# Patient Record
Sex: MALE | Race: WHITE | NOT HISPANIC OR LATINO | Employment: OTHER | URBAN - METROPOLITAN AREA
[De-identification: names, ages, dates, MRNs, and addresses within clinical notes are randomized per-mention and may not be internally consistent; named-entity substitution may affect disease eponyms.]

---

## 2021-01-01 ENCOUNTER — TELEPHONE (OUTPATIENT)
Dept: GASTROENTEROLOGY | Facility: CLINIC | Age: 76
End: 2021-01-01

## 2021-01-01 ENCOUNTER — OFFICE VISIT (OUTPATIENT)
Dept: GASTROENTEROLOGY | Facility: CLINIC | Age: 76
End: 2021-01-01
Payer: COMMERCIAL

## 2021-01-01 VITALS
HEIGHT: 68 IN | WEIGHT: 205 LBS | DIASTOLIC BLOOD PRESSURE: 80 MMHG | SYSTOLIC BLOOD PRESSURE: 150 MMHG | BODY MASS INDEX: 31.07 KG/M2

## 2021-01-01 DIAGNOSIS — R10.10 PAIN OF UPPER ABDOMEN: Primary | ICD-10-CM

## 2021-01-01 DIAGNOSIS — C61 PROSTATE CANCER (HCC): ICD-10-CM

## 2021-01-01 DIAGNOSIS — R19.4 CHANGE IN BOWEL HABIT: Primary | ICD-10-CM

## 2021-01-01 DIAGNOSIS — R19.4 CHANGE IN BOWEL HABIT: ICD-10-CM

## 2021-01-01 DIAGNOSIS — Z15.03 BRCA GENE MUTATION POSITIVE IN MALE: ICD-10-CM

## 2021-01-01 DIAGNOSIS — I25.10 CORONARY ARTERY DISEASE INVOLVING NATIVE CORONARY ARTERY OF NATIVE HEART WITHOUT ANGINA PECTORIS: ICD-10-CM

## 2021-01-01 DIAGNOSIS — Z15.09 BRCA GENE MUTATION POSITIVE IN MALE: ICD-10-CM

## 2021-01-01 DIAGNOSIS — K21.9 GASTROESOPHAGEAL REFLUX DISEASE WITHOUT ESOPHAGITIS: ICD-10-CM

## 2021-01-01 DIAGNOSIS — Z15.01 BRCA GENE MUTATION POSITIVE IN MALE: ICD-10-CM

## 2021-01-01 PROCEDURE — 99203 OFFICE O/P NEW LOW 30 MIN: CPT | Performed by: INTERNAL MEDICINE

## 2021-01-01 RX ORDER — CETIRIZINE HYDROCHLORIDE 10 MG/1
10 TABLET ORAL
COMMUNITY

## 2021-01-01 RX ORDER — CARVEDILOL 12.5 MG/1
TABLET ORAL
COMMUNITY
Start: 2021-01-01 | End: 2022-01-01 | Stop reason: HOSPADM

## 2021-01-01 RX ORDER — SODIUM PICOSULFATE, MAGNESIUM OXIDE, AND ANHYDROUS CITRIC ACID 10; 3.5; 12 MG/160ML; G/160ML; G/160ML
LIQUID ORAL
Qty: 320 ML | Refills: 0 | Status: SHIPPED | COMMUNITY
Start: 2021-01-01 | End: 2022-01-01 | Stop reason: ALTCHOICE

## 2021-01-01 RX ORDER — PANTOPRAZOLE SODIUM 20 MG/1
TABLET, DELAYED RELEASE ORAL
COMMUNITY
Start: 2021-01-01

## 2021-01-01 RX ORDER — ATORVASTATIN CALCIUM 40 MG/1
TABLET, FILM COATED ORAL
COMMUNITY
Start: 2021-01-01 | End: 2022-01-01 | Stop reason: HOSPADM

## 2021-01-01 RX ORDER — LISINOPRIL 10 MG/1
TABLET ORAL
COMMUNITY
Start: 2021-01-01 | End: 2022-01-01 | Stop reason: HOSPADM

## 2021-01-01 RX ORDER — ASPIRIN 81 MG/1
81 TABLET ORAL DAILY
COMMUNITY

## 2021-12-21 PROBLEM — R19.4 CHANGE IN BOWEL HABIT: Status: ACTIVE | Noted: 2021-01-01

## 2021-12-21 PROBLEM — Z15.01 BRCA GENE MUTATION POSITIVE IN MALE: Status: ACTIVE | Noted: 2021-01-01

## 2021-12-21 PROBLEM — Z15.09 BRCA GENE MUTATION POSITIVE IN MALE: Status: ACTIVE | Noted: 2021-01-01

## 2021-12-21 PROBLEM — Z15.03 BRCA GENE MUTATION POSITIVE IN MALE: Status: ACTIVE | Noted: 2021-01-01

## 2021-12-21 PROBLEM — C61 PROSTATE CANCER (HCC): Status: ACTIVE | Noted: 2021-01-01

## 2021-12-21 PROBLEM — R10.10 PAIN OF UPPER ABDOMEN: Status: ACTIVE | Noted: 2021-01-01

## 2021-12-21 PROBLEM — I25.10 CORONARY ARTERY DISEASE INVOLVING NATIVE CORONARY ARTERY OF NATIVE HEART WITHOUT ANGINA PECTORIS: Status: ACTIVE | Noted: 2021-01-01

## 2022-01-01 ENCOUNTER — TELEPHONE (OUTPATIENT)
Dept: GASTROENTEROLOGY | Facility: CLINIC | Age: 77
End: 2022-01-01

## 2022-01-01 ENCOUNTER — HOME CARE VISIT (OUTPATIENT)
Dept: HOME HEALTH SERVICES | Facility: HOME HEALTHCARE | Age: 77
End: 2022-01-01
Payer: MEDICARE

## 2022-01-01 ENCOUNTER — TELEPHONE (OUTPATIENT)
Dept: HEMATOLOGY ONCOLOGY | Facility: CLINIC | Age: 77
End: 2022-01-01

## 2022-01-01 ENCOUNTER — HOME CARE VISIT (OUTPATIENT)
Dept: HOME HOSPICE | Facility: HOSPICE | Age: 77
End: 2022-01-01
Payer: MEDICARE

## 2022-01-01 ENCOUNTER — PATIENT OUTREACH (OUTPATIENT)
Dept: HEMATOLOGY ONCOLOGY | Facility: CLINIC | Age: 77
End: 2022-01-01

## 2022-01-01 ENCOUNTER — APPOINTMENT (OUTPATIENT)
Dept: LAB | Facility: HOSPITAL | Age: 77
End: 2022-01-01
Attending: INTERNAL MEDICINE
Payer: COMMERCIAL

## 2022-01-01 ENCOUNTER — HOSPITAL ENCOUNTER (OUTPATIENT)
Dept: CT IMAGING | Facility: HOSPITAL | Age: 77
Discharge: HOME | End: 2022-01-19
Attending: INTERNAL MEDICINE
Payer: COMMERCIAL

## 2022-01-01 ENCOUNTER — OFFICE VISIT (OUTPATIENT)
Dept: PALLIATIVE MEDICINE | Age: 77
End: 2022-01-01
Payer: COMMERCIAL

## 2022-01-01 ENCOUNTER — APPOINTMENT (OUTPATIENT)
Dept: GASTROENTEROLOGY | Facility: HOSPITAL | Age: 77
DRG: 435 | End: 2022-01-01
Payer: MEDICARE

## 2022-01-01 ENCOUNTER — TELEPHONE (OUTPATIENT)
Dept: NUTRITION | Facility: CLINIC | Age: 77
End: 2022-01-01

## 2022-01-01 ENCOUNTER — APPOINTMENT (INPATIENT)
Dept: RADIOLOGY | Facility: HOSPITAL | Age: 77
DRG: 380 | End: 2022-01-01
Payer: MEDICARE

## 2022-01-01 ENCOUNTER — HOSPITAL ENCOUNTER (OUTPATIENT)
Dept: INFUSION CENTER | Facility: HOSPITAL | Age: 77
End: 2022-01-01
Attending: INTERNAL MEDICINE

## 2022-01-01 ENCOUNTER — HOSPITAL ENCOUNTER (OUTPATIENT)
Dept: INFUSION CENTER | Facility: HOSPITAL | Age: 77
Discharge: HOME | End: 2022-03-22
Payer: COMMERCIAL

## 2022-01-01 ENCOUNTER — TELEPHONE (OUTPATIENT)
Dept: PALLIATIVE MEDICINE | Facility: CLINIC | Age: 77
End: 2022-01-01

## 2022-01-01 ENCOUNTER — HOSPITAL ENCOUNTER (OUTPATIENT)
Dept: INFUSION CENTER | Facility: HOSPITAL | Age: 77
Discharge: HOME | End: 2022-04-22
Attending: INTERNAL MEDICINE
Payer: MEDICARE

## 2022-01-01 ENCOUNTER — TELEPHONE (OUTPATIENT)
Dept: HEMATOLOGY ONCOLOGY | Facility: HOSPITAL | Age: 77
End: 2022-01-01

## 2022-01-01 ENCOUNTER — OFFICE VISIT (OUTPATIENT)
Dept: HEMATOLOGY ONCOLOGY | Facility: CLINIC | Age: 77
End: 2022-01-01
Payer: COMMERCIAL

## 2022-01-01 ENCOUNTER — ANESTHESIA (INPATIENT)
Dept: GASTROENTEROLOGY | Facility: HOSPITAL | Age: 77
DRG: 380 | End: 2022-01-01
Payer: MEDICARE

## 2022-01-01 ENCOUNTER — HOSPITAL ENCOUNTER (OUTPATIENT)
Dept: INFUSION CENTER | Facility: HOSPITAL | Age: 77
Discharge: HOME | End: 2022-04-18
Payer: COMMERCIAL

## 2022-01-01 ENCOUNTER — PATIENT MESSAGE (OUTPATIENT)
Dept: GASTROENTEROLOGY | Facility: MEDICAL CENTER | Age: 77
End: 2022-01-01

## 2022-01-01 ENCOUNTER — HOSPITAL ENCOUNTER (OUTPATIENT)
Dept: GASTROENTEROLOGY | Facility: HOSPITAL | Age: 77
Setting detail: OUTPATIENT SURGERY
Discharge: HOME | End: 2022-03-31
Attending: INTERNAL MEDICINE | Admitting: INTERNAL MEDICINE
Payer: COMMERCIAL

## 2022-01-01 ENCOUNTER — TELEPHONE (OUTPATIENT)
Dept: OTHER | Facility: OTHER | Age: 77
End: 2022-01-01

## 2022-01-01 ENCOUNTER — SOCIAL WORK (OUTPATIENT)
Dept: PALLIATIVE MEDICINE | Age: 77
End: 2022-01-01
Payer: COMMERCIAL

## 2022-01-01 ENCOUNTER — HOSPITAL ENCOUNTER (OUTPATIENT)
Dept: INFUSION CENTER | Facility: HOSPITAL | Age: 77
End: 2022-01-01

## 2022-01-01 ENCOUNTER — HOSPITAL ENCOUNTER (OUTPATIENT)
Dept: INTERVENTIONAL RADIOLOGY/VASCULAR | Facility: HOSPITAL | Age: 77
Discharge: HOME | End: 2022-02-10
Attending: INTERNAL MEDICINE
Payer: COMMERCIAL

## 2022-01-01 ENCOUNTER — HOSPITAL ENCOUNTER (OUTPATIENT)
Dept: INFUSION CENTER | Facility: HOSPITAL | Age: 77
Discharge: HOME | End: 2022-03-25
Attending: INTERNAL MEDICINE
Payer: COMMERCIAL

## 2022-01-01 ENCOUNTER — APPOINTMENT (OUTPATIENT)
Dept: RADIOLOGY | Facility: HOSPITAL | Age: 77
DRG: 435 | End: 2022-01-01
Payer: MEDICARE

## 2022-01-01 ENCOUNTER — ANESTHESIA EVENT (OUTPATIENT)
Dept: GASTROENTEROLOGY | Facility: HOSPITAL | Age: 77
DRG: 435 | End: 2022-01-01
Payer: MEDICARE

## 2022-01-01 ENCOUNTER — NURSE TRIAGE (OUTPATIENT)
Dept: OTHER | Facility: OTHER | Age: 77
End: 2022-01-01

## 2022-01-01 ENCOUNTER — HOSPITAL ENCOUNTER (OUTPATIENT)
Dept: INFUSION CENTER | Facility: HOSPITAL | Age: 77
Discharge: HOME | End: 2022-02-11
Attending: INTERNAL MEDICINE
Payer: COMMERCIAL

## 2022-01-01 ENCOUNTER — OFFICE VISIT (OUTPATIENT)
Dept: GASTROENTEROLOGY | Facility: CLINIC | Age: 77
End: 2022-01-01
Payer: COMMERCIAL

## 2022-01-01 ENCOUNTER — ANESTHESIA (OUTPATIENT)
Dept: GASTROENTEROLOGY | Facility: HOSPITAL | Age: 77
End: 2022-01-01

## 2022-01-01 ENCOUNTER — HOSPITAL ENCOUNTER (INPATIENT)
Facility: HOSPITAL | Age: 77
LOS: 9 days | Discharge: HOME HEALTH | DRG: 380 | End: 2022-04-13
Attending: EMERGENCY MEDICINE | Admitting: INTERNAL MEDICINE
Payer: MEDICARE

## 2022-01-01 ENCOUNTER — LAB REQUISITION (OUTPATIENT)
Dept: LAB | Facility: HOSPITAL | Age: 77
End: 2022-01-01

## 2022-01-01 ENCOUNTER — OFFICE VISIT (OUTPATIENT)
Dept: HEMATOLOGY ONCOLOGY | Facility: HOSPITAL | Age: 77
End: 2022-01-01
Payer: COMMERCIAL

## 2022-01-01 ENCOUNTER — HOSPITAL ENCOUNTER (INPATIENT)
Facility: HOSPITAL | Age: 77
LOS: 1 days | Discharge: HOME HEALTH | DRG: 435 | End: 2022-01-22
Attending: EMERGENCY MEDICINE | Admitting: FAMILY MEDICINE
Payer: MEDICARE

## 2022-01-01 ENCOUNTER — ANESTHESIA EVENT (OUTPATIENT)
Dept: GASTROENTEROLOGY | Facility: HOSPITAL | Age: 77
End: 2022-01-01

## 2022-01-01 ENCOUNTER — HOSPICE ADMISSION (OUTPATIENT)
Dept: HOME HOSPICE | Facility: HOSPICE | Age: 77
End: 2022-01-01
Payer: MEDICARE

## 2022-01-01 ENCOUNTER — HOSPITAL ENCOUNTER (OUTPATIENT)
Dept: INFUSION CENTER | Facility: HOSPITAL | Age: 77
Discharge: HOME | End: 2022-03-21
Payer: COMMERCIAL

## 2022-01-01 ENCOUNTER — OFFICE VISIT (OUTPATIENT)
Dept: WOUND CARE | Facility: HOSPITAL | Age: 77
End: 2022-01-01
Payer: MEDICARE

## 2022-01-01 ENCOUNTER — HOSPITAL ENCOUNTER (OUTPATIENT)
Dept: INFUSION CENTER | Facility: HOSPITAL | Age: 77
Discharge: HOME | End: 2022-02-25
Attending: INTERNAL MEDICINE
Payer: COMMERCIAL

## 2022-01-01 ENCOUNTER — HOSPITAL ENCOUNTER (OUTPATIENT)
Dept: INFUSION CENTER | Facility: HOSPITAL | Age: 77
Discharge: HOME | End: 2022-02-04
Attending: INTERNAL MEDICINE
Payer: COMMERCIAL

## 2022-01-01 ENCOUNTER — HOSPITAL ENCOUNTER (OUTPATIENT)
Dept: INFUSION CENTER | Facility: HOSPITAL | Age: 77
Discharge: HOME | End: 2022-04-20
Attending: INTERNAL MEDICINE
Payer: COMMERCIAL

## 2022-01-01 ENCOUNTER — PREP FOR PROCEDURE (OUTPATIENT)
Dept: GASTROENTEROLOGY | Facility: CLINIC | Age: 77
End: 2022-01-01

## 2022-01-01 ENCOUNTER — APPOINTMENT (OUTPATIENT)
Dept: LAB | Facility: HOSPITAL | Age: 77
End: 2022-01-01
Payer: MEDICARE

## 2022-01-01 ENCOUNTER — ANESTHESIA (OUTPATIENT)
Dept: GASTROENTEROLOGY | Facility: HOSPITAL | Age: 77
DRG: 435 | End: 2022-01-01
Payer: MEDICARE

## 2022-01-01 ENCOUNTER — APPOINTMENT (OUTPATIENT)
Dept: LAB | Facility: HOSPITAL | Age: 77
End: 2022-01-01
Payer: COMMERCIAL

## 2022-01-01 ENCOUNTER — TELEPHONE (OUTPATIENT)
Dept: INTERVENTIONAL RADIOLOGY/VASCULAR | Facility: HOSPITAL | Age: 77
End: 2022-01-01

## 2022-01-01 ENCOUNTER — TELEMEDICINE (OUTPATIENT)
Dept: PALLIATIVE MEDICINE | Age: 77
End: 2022-01-01
Payer: COMMERCIAL

## 2022-01-01 ENCOUNTER — ANESTHESIA EVENT (INPATIENT)
Dept: GASTROENTEROLOGY | Facility: HOSPITAL | Age: 77
DRG: 380 | End: 2022-01-01
Payer: MEDICARE

## 2022-01-01 ENCOUNTER — HOSPITAL ENCOUNTER (OUTPATIENT)
Dept: INFUSION CENTER | Facility: HOSPITAL | Age: 77
Discharge: HOME | End: 2022-04-25
Payer: MEDICARE

## 2022-01-01 ENCOUNTER — ANESTHESIA EVENT (OUTPATIENT)
Dept: ANESTHESIOLOGY | Facility: HOSPITAL | Age: 77
End: 2022-01-01

## 2022-01-01 ENCOUNTER — HOSPITAL ENCOUNTER (OUTPATIENT)
Dept: CT IMAGING | Facility: HOSPITAL | Age: 77
Discharge: HOME | End: 2022-02-05
Attending: INTERNAL MEDICINE
Payer: COMMERCIAL

## 2022-01-01 ENCOUNTER — APPOINTMENT (EMERGENCY)
Dept: RADIOLOGY | Facility: HOSPITAL | Age: 77
DRG: 380 | End: 2022-01-01
Payer: MEDICARE

## 2022-01-01 ENCOUNTER — OFFICE VISIT (OUTPATIENT)
Dept: WOUND CARE | Facility: HOSPITAL | Age: 77
End: 2022-01-01
Payer: COMMERCIAL

## 2022-01-01 ENCOUNTER — HOSPITAL ENCOUNTER (OUTPATIENT)
Dept: INFUSION CENTER | Facility: HOSPITAL | Age: 77
Discharge: HOME | End: 2022-03-18
Attending: INTERNAL MEDICINE
Payer: COMMERCIAL

## 2022-01-01 ENCOUNTER — TELEPHONE (OUTPATIENT)
Dept: INTERNAL MEDICINE UNIT | Facility: HOSPITAL | Age: 77
End: 2022-01-01

## 2022-01-01 ENCOUNTER — HOSPITAL ENCOUNTER (OUTPATIENT)
Dept: INFUSION CENTER | Facility: HOSPITAL | Age: 77
Discharge: HOME | End: 2022-03-04
Attending: INTERNAL MEDICINE
Payer: COMMERCIAL

## 2022-01-01 ENCOUNTER — APPOINTMENT (OUTPATIENT)
Dept: GASTROENTEROLOGY | Facility: HOSPITAL | Age: 77
DRG: 380 | End: 2022-01-01
Payer: MEDICARE

## 2022-01-01 ENCOUNTER — DOCUMENTATION (OUTPATIENT)
Dept: HEMATOLOGY ONCOLOGY | Facility: HOSPITAL | Age: 77
End: 2022-01-01

## 2022-01-01 ENCOUNTER — ANESTHESIA (OUTPATIENT)
Dept: ANESTHESIOLOGY | Facility: HOSPITAL | Age: 77
End: 2022-01-01

## 2022-01-01 VITALS
WEIGHT: 178 LBS | HEART RATE: 111 BPM | SYSTOLIC BLOOD PRESSURE: 112 MMHG | BODY MASS INDEX: 27.06 KG/M2 | RESPIRATION RATE: 14 BRPM | TEMPERATURE: 95.9 F | DIASTOLIC BLOOD PRESSURE: 90 MMHG | OXYGEN SATURATION: 93 %

## 2022-01-01 VITALS
DIASTOLIC BLOOD PRESSURE: 73 MMHG | SYSTOLIC BLOOD PRESSURE: 104 MMHG | RESPIRATION RATE: 14 BRPM | OXYGEN SATURATION: 97 % | HEART RATE: 105 BPM | TEMPERATURE: 97.2 F

## 2022-01-01 VITALS
DIASTOLIC BLOOD PRESSURE: 77 MMHG | SYSTOLIC BLOOD PRESSURE: 117 MMHG | HEART RATE: 105 BPM | RESPIRATION RATE: 18 BRPM | OXYGEN SATURATION: 94 % | TEMPERATURE: 97.1 F

## 2022-01-01 VITALS
RESPIRATION RATE: 18 BRPM | HEART RATE: 90 BPM | HEIGHT: 68 IN | WEIGHT: 164 LBS | DIASTOLIC BLOOD PRESSURE: 70 MMHG | TEMPERATURE: 97.3 F | OXYGEN SATURATION: 95 % | BODY MASS INDEX: 24.86 KG/M2 | SYSTOLIC BLOOD PRESSURE: 111 MMHG

## 2022-01-01 VITALS
HEART RATE: 94 BPM | OXYGEN SATURATION: 98 % | SYSTOLIC BLOOD PRESSURE: 121 MMHG | DIASTOLIC BLOOD PRESSURE: 70 MMHG | TEMPERATURE: 98.1 F | RESPIRATION RATE: 16 BRPM

## 2022-01-01 VITALS
SYSTOLIC BLOOD PRESSURE: 102 MMHG | HEART RATE: 102 BPM | BODY MASS INDEX: 25.85 KG/M2 | OXYGEN SATURATION: 97 % | WEIGHT: 170 LBS | RESPIRATION RATE: 18 BRPM | DIASTOLIC BLOOD PRESSURE: 70 MMHG | TEMPERATURE: 96.5 F

## 2022-01-01 VITALS
BODY MASS INDEX: 29.7 KG/M2 | HEART RATE: 83 BPM | DIASTOLIC BLOOD PRESSURE: 76 MMHG | DIASTOLIC BLOOD PRESSURE: 68 MMHG | TEMPERATURE: 97.4 F | RESPIRATION RATE: 18 BRPM | SYSTOLIC BLOOD PRESSURE: 102 MMHG | HEIGHT: 68 IN | SYSTOLIC BLOOD PRESSURE: 118 MMHG | OXYGEN SATURATION: 97 % | WEIGHT: 196 LBS

## 2022-01-01 VITALS
HEIGHT: 68 IN | TEMPERATURE: 97.5 F | SYSTOLIC BLOOD PRESSURE: 105 MMHG | DIASTOLIC BLOOD PRESSURE: 78 MMHG | WEIGHT: 178 LBS | RESPIRATION RATE: 16 BRPM | BODY MASS INDEX: 26.98 KG/M2 | HEART RATE: 104 BPM

## 2022-01-01 VITALS
SYSTOLIC BLOOD PRESSURE: 119 MMHG | OXYGEN SATURATION: 98 % | TEMPERATURE: 96.8 F | DIASTOLIC BLOOD PRESSURE: 78 MMHG | HEART RATE: 123 BPM | OXYGEN SATURATION: 96 % | HEART RATE: 105 BPM | TEMPERATURE: 96.1 F | DIASTOLIC BLOOD PRESSURE: 62 MMHG | RESPIRATION RATE: 16 BRPM | RESPIRATION RATE: 16 BRPM | SYSTOLIC BLOOD PRESSURE: 100 MMHG | HEIGHT: 68 IN | WEIGHT: 170.4 LBS | BODY MASS INDEX: 25.82 KG/M2

## 2022-01-01 VITALS
DIASTOLIC BLOOD PRESSURE: 75 MMHG | BODY MASS INDEX: 29.8 KG/M2 | OXYGEN SATURATION: 92 % | HEART RATE: 66 BPM | SYSTOLIC BLOOD PRESSURE: 143 MMHG | TEMPERATURE: 98.4 F | RESPIRATION RATE: 16 BRPM | HEIGHT: 68 IN

## 2022-01-01 VITALS
RESPIRATION RATE: 16 BRPM | WEIGHT: 188 LBS | OXYGEN SATURATION: 99 % | TEMPERATURE: 96.8 F | BODY MASS INDEX: 28.49 KG/M2 | SYSTOLIC BLOOD PRESSURE: 86 MMHG | DIASTOLIC BLOOD PRESSURE: 56 MMHG | HEART RATE: 84 BPM | HEIGHT: 68 IN

## 2022-01-01 VITALS
RESPIRATION RATE: 16 BRPM | HEIGHT: 67 IN | OXYGEN SATURATION: 98 % | HEART RATE: 114 BPM | SYSTOLIC BLOOD PRESSURE: 115 MMHG | WEIGHT: 179.45 LBS | TEMPERATURE: 96.7 F | BODY MASS INDEX: 28.17 KG/M2 | DIASTOLIC BLOOD PRESSURE: 75 MMHG

## 2022-01-01 VITALS
OXYGEN SATURATION: 98 % | TEMPERATURE: 95.8 F | HEIGHT: 68 IN | RESPIRATION RATE: 16 BRPM | DIASTOLIC BLOOD PRESSURE: 66 MMHG | BODY MASS INDEX: 29.13 KG/M2 | WEIGHT: 192.2 LBS | HEART RATE: 88 BPM | SYSTOLIC BLOOD PRESSURE: 88 MMHG

## 2022-01-01 VITALS
WEIGHT: 180.12 LBS | BODY MASS INDEX: 28.27 KG/M2 | TEMPERATURE: 96.4 F | OXYGEN SATURATION: 97 % | RESPIRATION RATE: 14 BRPM | HEART RATE: 78 BPM | DIASTOLIC BLOOD PRESSURE: 60 MMHG | HEIGHT: 67 IN | SYSTOLIC BLOOD PRESSURE: 91 MMHG

## 2022-01-01 VITALS
WEIGHT: 173.5 LBS | TEMPERATURE: 97.2 F | HEART RATE: 79 BPM | RESPIRATION RATE: 16 BRPM | DIASTOLIC BLOOD PRESSURE: 89 MMHG | BODY MASS INDEX: 27.23 KG/M2 | OXYGEN SATURATION: 97 % | HEIGHT: 67 IN | SYSTOLIC BLOOD PRESSURE: 134 MMHG

## 2022-01-01 VITALS
SYSTOLIC BLOOD PRESSURE: 118 MMHG | WEIGHT: 169 LBS | DIASTOLIC BLOOD PRESSURE: 82 MMHG | HEART RATE: 74 BPM | HEIGHT: 68 IN | BODY MASS INDEX: 25.61 KG/M2

## 2022-01-01 VITALS
TEMPERATURE: 96.3 F | BODY MASS INDEX: 27.99 KG/M2 | DIASTOLIC BLOOD PRESSURE: 76 MMHG | RESPIRATION RATE: 16 BRPM | HEIGHT: 65 IN | WEIGHT: 168 LBS | SYSTOLIC BLOOD PRESSURE: 102 MMHG | HEART RATE: 68 BPM

## 2022-01-01 VITALS
RESPIRATION RATE: 18 BRPM | DIASTOLIC BLOOD PRESSURE: 70 MMHG | TEMPERATURE: 99.1 F | HEART RATE: 86 BPM | SYSTOLIC BLOOD PRESSURE: 122 MMHG

## 2022-01-01 VITALS
RESPIRATION RATE: 18 BRPM | HEART RATE: 78 BPM | DIASTOLIC BLOOD PRESSURE: 58 MMHG | SYSTOLIC BLOOD PRESSURE: 110 MMHG | TEMPERATURE: 99.1 F

## 2022-01-01 VITALS
RESPIRATION RATE: 18 BRPM | DIASTOLIC BLOOD PRESSURE: 90 MMHG | TEMPERATURE: 96 F | HEART RATE: 100 BPM | SYSTOLIC BLOOD PRESSURE: 130 MMHG

## 2022-01-01 VITALS
BODY MASS INDEX: 24.86 KG/M2 | HEIGHT: 68 IN | SYSTOLIC BLOOD PRESSURE: 120 MMHG | DIASTOLIC BLOOD PRESSURE: 70 MMHG | HEART RATE: 96 BPM | RESPIRATION RATE: 14 BRPM | OXYGEN SATURATION: 98 % | TEMPERATURE: 96.8 F | WEIGHT: 164 LBS

## 2022-01-01 VITALS
WEIGHT: 180 LBS | HEART RATE: 116 BPM | SYSTOLIC BLOOD PRESSURE: 110 MMHG | BODY MASS INDEX: 27.28 KG/M2 | OXYGEN SATURATION: 96 % | DIASTOLIC BLOOD PRESSURE: 80 MMHG | HEIGHT: 68 IN | RESPIRATION RATE: 14 BRPM | TEMPERATURE: 97.4 F

## 2022-01-01 VITALS
RESPIRATION RATE: 16 BRPM | DIASTOLIC BLOOD PRESSURE: 65 MMHG | TEMPERATURE: 97.2 F | BODY MASS INDEX: 27.88 KG/M2 | SYSTOLIC BLOOD PRESSURE: 137 MMHG | WEIGHT: 173.5 LBS | OXYGEN SATURATION: 96 % | HEIGHT: 66 IN | HEART RATE: 104 BPM

## 2022-01-01 VITALS
BODY MASS INDEX: 28.63 KG/M2 | HEIGHT: 66 IN | RESPIRATION RATE: 16 BRPM | SYSTOLIC BLOOD PRESSURE: 117 MMHG | HEART RATE: 113 BPM | OXYGEN SATURATION: 98 % | WEIGHT: 178.13 LBS | DIASTOLIC BLOOD PRESSURE: 83 MMHG | TEMPERATURE: 96.3 F

## 2022-01-01 VITALS
RESPIRATION RATE: 18 BRPM | TEMPERATURE: 98.2 F | SYSTOLIC BLOOD PRESSURE: 110 MMHG | HEART RATE: 78 BPM | DIASTOLIC BLOOD PRESSURE: 68 MMHG

## 2022-01-01 VITALS
RESPIRATION RATE: 18 BRPM | DIASTOLIC BLOOD PRESSURE: 51 MMHG | HEART RATE: 87 BPM | TEMPERATURE: 96.5 F | OXYGEN SATURATION: 100 % | HEIGHT: 67 IN | SYSTOLIC BLOOD PRESSURE: 84 MMHG | BODY MASS INDEX: 28.89 KG/M2 | WEIGHT: 184.08 LBS

## 2022-01-01 VITALS
SYSTOLIC BLOOD PRESSURE: 91 MMHG | TEMPERATURE: 97.5 F | RESPIRATION RATE: 14 BRPM | DIASTOLIC BLOOD PRESSURE: 57 MMHG | WEIGHT: 180 LBS | HEART RATE: 76 BPM | HEIGHT: 68 IN | BODY MASS INDEX: 27.28 KG/M2 | OXYGEN SATURATION: 99 %

## 2022-01-01 DIAGNOSIS — T45.1X5A CHEMOTHERAPY INDUCED NEUTROPENIA (HCC): ICD-10-CM

## 2022-01-01 DIAGNOSIS — Z15.09 BRCA GENE MUTATION POSITIVE IN MALE: ICD-10-CM

## 2022-01-01 DIAGNOSIS — G89.3 CANCER RELATED PAIN: ICD-10-CM

## 2022-01-01 DIAGNOSIS — D50.8 OTHER IRON DEFICIENCY ANEMIA: Primary | ICD-10-CM

## 2022-01-01 DIAGNOSIS — C25.0 MALIGNANT NEOPLASM OF HEAD OF PANCREAS (HCC): Primary | ICD-10-CM

## 2022-01-01 DIAGNOSIS — C80.1 OBSTRUCTIVE JAUNDICE DUE TO CANCER (HCC): ICD-10-CM

## 2022-01-01 DIAGNOSIS — K86.89 PANCREATIC MASS: ICD-10-CM

## 2022-01-01 DIAGNOSIS — D70.1 CHEMOTHERAPY INDUCED NEUTROPENIA (HCC): ICD-10-CM

## 2022-01-01 DIAGNOSIS — Z51.5 PALLIATIVE CARE PATIENT: ICD-10-CM

## 2022-01-01 DIAGNOSIS — C25.0 MALIGNANT NEOPLASM OF HEAD OF PANCREAS (HCC): ICD-10-CM

## 2022-01-01 DIAGNOSIS — Z15.01 BRCA GENE MUTATION POSITIVE IN MALE: ICD-10-CM

## 2022-01-01 DIAGNOSIS — D61.818 OTHER PANCYTOPENIA (HCC): ICD-10-CM

## 2022-01-01 DIAGNOSIS — R19.4 CHANGE IN BOWEL HABIT: ICD-10-CM

## 2022-01-01 DIAGNOSIS — Z71.89 GOALS OF CARE, COUNSELING/DISCUSSION: ICD-10-CM

## 2022-01-01 DIAGNOSIS — Z15.03 BRCA GENE MUTATION POSITIVE IN MALE: ICD-10-CM

## 2022-01-01 DIAGNOSIS — R11.0 NAUSEA: ICD-10-CM

## 2022-01-01 DIAGNOSIS — T45.1X5A ANTINEOPLASTIC CHEMOTHERAPY INDUCED ANEMIA: ICD-10-CM

## 2022-01-01 DIAGNOSIS — K83.1 OBSTRUCTIVE JAUNDICE DUE TO CANCER (HCC): ICD-10-CM

## 2022-01-01 DIAGNOSIS — C61 PROSTATE CANCER (HCC): ICD-10-CM

## 2022-01-01 DIAGNOSIS — G47.00 INSOMNIA: ICD-10-CM

## 2022-01-01 DIAGNOSIS — D50.8 IRON DEFICIENCY ANEMIA SECONDARY TO INADEQUATE DIETARY IRON INTAKE: ICD-10-CM

## 2022-01-01 DIAGNOSIS — R82.998 DARK URINE: ICD-10-CM

## 2022-01-01 DIAGNOSIS — R19.4 CHANGE IN BOWEL HABITS: ICD-10-CM

## 2022-01-01 DIAGNOSIS — R71.8 OTHER ABNORMALITY OF RED BLOOD CELLS: ICD-10-CM

## 2022-01-01 DIAGNOSIS — K59.04 CHRONIC IDIOPATHIC CONSTIPATION: ICD-10-CM

## 2022-01-01 DIAGNOSIS — R53.83 FATIGUE, UNSPECIFIED TYPE: ICD-10-CM

## 2022-01-01 DIAGNOSIS — S51.801A OPEN WOUND OF RIGHT FOREARM, INITIAL ENCOUNTER: Primary | ICD-10-CM

## 2022-01-01 DIAGNOSIS — D50.8 OTHER IRON DEFICIENCY ANEMIA: ICD-10-CM

## 2022-01-01 DIAGNOSIS — G89.3 CANCER RELATED PAIN: Primary | ICD-10-CM

## 2022-01-01 DIAGNOSIS — E86.0 DEHYDRATION: Primary | ICD-10-CM

## 2022-01-01 DIAGNOSIS — N18.30 STAGE 3 CHRONIC KIDNEY DISEASE, UNSPECIFIED WHETHER STAGE 3A OR 3B CKD (HCC): ICD-10-CM

## 2022-01-01 DIAGNOSIS — K59.03 DRUG-INDUCED CONSTIPATION: ICD-10-CM

## 2022-01-01 DIAGNOSIS — F51.01 PRIMARY INSOMNIA: ICD-10-CM

## 2022-01-01 DIAGNOSIS — R63.4 WEIGHT LOSS: ICD-10-CM

## 2022-01-01 DIAGNOSIS — R71.8 OTHER ABNORMALITY OF RED BLOOD CELLS: Primary | ICD-10-CM

## 2022-01-01 DIAGNOSIS — R10.84 GENERALIZED ABDOMINAL PAIN: ICD-10-CM

## 2022-01-01 DIAGNOSIS — Z00.00 ROUTINE GENERAL MEDICAL EXAMINATION AT A HEALTH CARE FACILITY: ICD-10-CM

## 2022-01-01 DIAGNOSIS — R63.0 ANOREXIA: ICD-10-CM

## 2022-01-01 DIAGNOSIS — Z71.89 COUNSELING AND COORDINATION OF CARE: Primary | ICD-10-CM

## 2022-01-01 DIAGNOSIS — Z95.828 PORT-A-CATH IN PLACE: Primary | ICD-10-CM

## 2022-01-01 DIAGNOSIS — D70.1 NEUTROPENIA ASSOCIATED WITH MUCOSITIS DUE TO ANTINEOPLASTIC THERAPY (HCC): ICD-10-CM

## 2022-01-01 DIAGNOSIS — K86.89 PANCREATIC MASS: Primary | ICD-10-CM

## 2022-01-01 DIAGNOSIS — C25.9 MALIGNANT NEOPLASM OF PANCREAS, UNSPECIFIED LOCATION OF MALIGNANCY (HCC): ICD-10-CM

## 2022-01-01 DIAGNOSIS — D50.0 BLOOD LOSS ANEMIA: ICD-10-CM

## 2022-01-01 DIAGNOSIS — Z15.09 GENETIC SUSCEPTIBILITY TO OTHER MALIGNANT NEOPLASM: ICD-10-CM

## 2022-01-01 DIAGNOSIS — K59.03 THERAPEUTIC OPIOID-INDUCED CONSTIPATION (OIC): ICD-10-CM

## 2022-01-01 DIAGNOSIS — K31.5 DUODENAL STRICTURE: ICD-10-CM

## 2022-01-01 DIAGNOSIS — K83.1 OBSTRUCTIVE JAUNDICE: ICD-10-CM

## 2022-01-01 DIAGNOSIS — D64.81 ANTINEOPLASTIC CHEMOTHERAPY INDUCED ANEMIA: ICD-10-CM

## 2022-01-01 DIAGNOSIS — K12.31 NEUTROPENIA ASSOCIATED WITH MUCOSITIS DUE TO ANTINEOPLASTIC THERAPY (HCC): ICD-10-CM

## 2022-01-01 DIAGNOSIS — E43 SEVERE PROTEIN-CALORIE MALNUTRITION (HCC): ICD-10-CM

## 2022-01-01 DIAGNOSIS — D61.818 OTHER PANCYTOPENIA (HCC): Primary | ICD-10-CM

## 2022-01-01 DIAGNOSIS — R10.10 PAIN OF UPPER ABDOMEN: Primary | ICD-10-CM

## 2022-01-01 DIAGNOSIS — T45.1X5S NEUTROPENIA ASSOCIATED WITH MUCOSITIS DUE TO ANTINEOPLASTIC THERAPY (HCC): ICD-10-CM

## 2022-01-01 DIAGNOSIS — T40.2X5A THERAPEUTIC OPIOID-INDUCED CONSTIPATION (OIC): ICD-10-CM

## 2022-01-01 DIAGNOSIS — Z00.00 EXAMINATION: ICD-10-CM

## 2022-01-01 DIAGNOSIS — C80.1 OBSTRUCTIVE JAUNDICE DUE TO CANCER (HCC): Primary | ICD-10-CM

## 2022-01-01 DIAGNOSIS — K83.1 OBSTRUCTIVE JAUNDICE DUE TO CANCER (HCC): Primary | ICD-10-CM

## 2022-01-01 DIAGNOSIS — E86.0 DEHYDRATION: ICD-10-CM

## 2022-01-01 DIAGNOSIS — R63.4 ABNORMAL WEIGHT LOSS: ICD-10-CM

## 2022-01-01 DIAGNOSIS — R10.10 PAIN OF UPPER ABDOMEN: ICD-10-CM

## 2022-01-01 DIAGNOSIS — R11.2 NAUSEA AND VOMITING, UNSPECIFIED VOMITING TYPE: Primary | ICD-10-CM

## 2022-01-01 DIAGNOSIS — Z15.01 GENETIC SUSCEPTIBILITY TO MALIGNANT NEOPLASM OF BREAST: ICD-10-CM

## 2022-01-01 DIAGNOSIS — D64.9 ANEMIA, UNSPECIFIED TYPE: ICD-10-CM

## 2022-01-01 DIAGNOSIS — Z15.03 GENETIC SUSCEPTIBILITY TO MALIGNANT NEOPLASM OF PROSTATE: ICD-10-CM

## 2022-01-01 DIAGNOSIS — D72.829 LEUKOCYTOSIS, UNSPECIFIED TYPE: ICD-10-CM

## 2022-01-01 DIAGNOSIS — Z95.828 PORT-A-CATH IN PLACE: ICD-10-CM

## 2022-01-01 LAB
ABO GROUP BLD BPU: NORMAL
ABO GROUP BLD: NORMAL
ALBUMIN SERPL BCP-MCNC: 1.7 G/DL (ref 3.5–5)
ALBUMIN SERPL BCP-MCNC: 2 G/DL (ref 3.5–5)
ALBUMIN SERPL BCP-MCNC: 2 G/DL (ref 3.5–5)
ALBUMIN SERPL BCP-MCNC: 2.2 G/DL (ref 3.5–5)
ALBUMIN SERPL BCP-MCNC: 2.2 G/DL (ref 3.5–5)
ALBUMIN SERPL BCP-MCNC: 2.4 G/DL (ref 3.5–5)
ALBUMIN SERPL BCP-MCNC: 2.4 G/DL (ref 3.5–5)
ALBUMIN SERPL BCP-MCNC: 2.5 G/DL (ref 3.5–5)
ALBUMIN SERPL BCP-MCNC: 2.5 G/DL (ref 3.5–5)
ALBUMIN SERPL BCP-MCNC: 2.8 G/DL (ref 3.5–5)
ALBUMIN SERPL BCP-MCNC: 2.9 G/DL (ref 3.5–5)
ALBUMIN SERPL BCP-MCNC: 3.1 G/DL (ref 3.5–5)
ALBUMIN SERPL BCP-MCNC: 3.2 G/DL (ref 3.5–5)
ALBUMIN SERPL BCP-MCNC: 3.2 G/DL (ref 3.5–5)
ALL TARGETS: NOT DETECTED
ALP SERPL-CCNC: 147 U/L (ref 46–116)
ALP SERPL-CCNC: 151 U/L (ref 46–116)
ALP SERPL-CCNC: 151 U/L (ref 46–116)
ALP SERPL-CCNC: 153 U/L (ref 46–116)
ALP SERPL-CCNC: 156 U/L (ref 46–116)
ALP SERPL-CCNC: 160 U/L (ref 46–116)
ALP SERPL-CCNC: 162 U/L (ref 46–116)
ALP SERPL-CCNC: 167 U/L (ref 46–116)
ALP SERPL-CCNC: 177 U/L (ref 46–116)
ALP SERPL-CCNC: 190 U/L (ref 46–116)
ALP SERPL-CCNC: 212 U/L (ref 46–116)
ALP SERPL-CCNC: 238 U/L (ref 46–116)
ALP SERPL-CCNC: 295 U/L (ref 46–116)
ALP SERPL-CCNC: 326 U/L (ref 46–116)
ALP SERPL-CCNC: 330 U/L (ref 46–116)
ALP SERPL-CCNC: 336 U/L (ref 46–116)
ALT SERPL W P-5'-P-CCNC: 11 U/L (ref 12–78)
ALT SERPL W P-5'-P-CCNC: 114 U/L (ref 12–78)
ALT SERPL W P-5'-P-CCNC: 16 U/L (ref 12–78)
ALT SERPL W P-5'-P-CCNC: 16 U/L (ref 12–78)
ALT SERPL W P-5'-P-CCNC: 21 U/L (ref 12–78)
ALT SERPL W P-5'-P-CCNC: 23 U/L (ref 12–78)
ALT SERPL W P-5'-P-CCNC: 30 U/L (ref 12–78)
ALT SERPL W P-5'-P-CCNC: 30 U/L (ref 12–78)
ALT SERPL W P-5'-P-CCNC: 32 U/L (ref 12–78)
ALT SERPL W P-5'-P-CCNC: 37 U/L (ref 12–78)
ALT SERPL W P-5'-P-CCNC: 375 U/L (ref 12–78)
ALT SERPL W P-5'-P-CCNC: 446 U/L (ref 12–78)
ALT SERPL W P-5'-P-CCNC: 50 U/L (ref 12–78)
ALT SERPL W P-5'-P-CCNC: 51 U/L (ref 12–78)
ALT SERPL W P-5'-P-CCNC: 539 U/L (ref 12–78)
ALT SERPL W P-5'-P-CCNC: 578 U/L (ref 12–78)
ANION GAP SERPL CALCULATED.3IONS-SCNC: 13 MMOL/L (ref 4–13)
ANION GAP SERPL CALCULATED.3IONS-SCNC: 3 MMOL/L (ref 4–13)
ANION GAP SERPL CALCULATED.3IONS-SCNC: 4 MMOL/L (ref 4–13)
ANION GAP SERPL CALCULATED.3IONS-SCNC: 5 MMOL/L (ref 4–13)
ANION GAP SERPL CALCULATED.3IONS-SCNC: 6 MMOL/L (ref 4–13)
ANION GAP SERPL CALCULATED.3IONS-SCNC: 7 MMOL/L (ref 4–13)
ANION GAP SERPL CALCULATED.3IONS-SCNC: 8 MMOL/L (ref 4–13)
ANISOCYTOSIS BLD QL SMEAR: PRESENT
APTT PPP: 29 SECONDS (ref 23–37)
APTT PPP: 31 SECONDS (ref 23–37)
ARTIFACT: PRESENT
ARTIFACT: PRESENT
AST SERPL W P-5'-P-CCNC: 207 U/L (ref 5–45)
AST SERPL W P-5'-P-CCNC: 28 U/L (ref 5–45)
AST SERPL W P-5'-P-CCNC: 29 U/L (ref 5–45)
AST SERPL W P-5'-P-CCNC: 30 U/L (ref 5–45)
AST SERPL W P-5'-P-CCNC: 302 U/L (ref 5–45)
AST SERPL W P-5'-P-CCNC: 34 U/L (ref 5–45)
AST SERPL W P-5'-P-CCNC: 36 U/L (ref 5–45)
AST SERPL W P-5'-P-CCNC: 38 U/L (ref 5–45)
AST SERPL W P-5'-P-CCNC: 424 U/L (ref 5–45)
AST SERPL W P-5'-P-CCNC: 44 U/L (ref 5–45)
AST SERPL W P-5'-P-CCNC: 44 U/L (ref 5–45)
AST SERPL W P-5'-P-CCNC: 450 U/L (ref 5–45)
AST SERPL W P-5'-P-CCNC: 46 U/L (ref 5–45)
AST SERPL W P-5'-P-CCNC: 50 U/L (ref 5–45)
AST SERPL W P-5'-P-CCNC: 53 U/L (ref 5–45)
AST SERPL W P-5'-P-CCNC: 66 U/L (ref 5–45)
BACTERIA BLD CULT: ABNORMAL
BACTERIA BLD CULT: NORMAL
BACTERIA BLD CULT: NORMAL
BACTERIA UR QL AUTO: ABNORMAL /HPF
BACTERIA UR QL AUTO: ABNORMAL /HPF
BASOPHILS # BLD AUTO: 0.01 THOUSANDS/ΜL (ref 0–0.1)
BASOPHILS # BLD AUTO: 0.02 THOUSANDS/ΜL (ref 0–0.1)
BASOPHILS # BLD AUTO: 0.03 THOUSANDS/ΜL (ref 0–0.1)
BASOPHILS # BLD AUTO: 0.03 THOUSANDS/ΜL (ref 0–0.1)
BASOPHILS # BLD AUTO: 0.04 THOUSANDS/ΜL (ref 0–0.1)
BASOPHILS # BLD AUTO: 0.07 THOUSANDS/ΜL (ref 0–0.1)
BASOPHILS # BLD AUTO: 0.09 THOUSANDS/ΜL (ref 0–0.1)
BASOPHILS # BLD AUTO: 0.09 THOUSANDS/ΜL (ref 0–0.1)
BASOPHILS # BLD MANUAL: 0 THOUSAND/UL (ref 0–0.1)
BASOPHILS NFR BLD AUTO: 0 % (ref 0–1)
BASOPHILS NFR BLD AUTO: 1 % (ref 0–1)
BASOPHILS NFR MAR MANUAL: 0 % (ref 0–1)
BILIRUB DIRECT SERPL-MCNC: 1.02 MG/DL (ref 0–0.2)
BILIRUB DIRECT SERPL-MCNC: 3.77 MG/DL (ref 0–0.2)
BILIRUB SERPL-MCNC: 0.47 MG/DL (ref 0.2–1)
BILIRUB SERPL-MCNC: 0.5 MG/DL (ref 0.2–1)
BILIRUB SERPL-MCNC: 0.5 MG/DL (ref 0.2–1)
BILIRUB SERPL-MCNC: 0.56 MG/DL (ref 0.2–1)
BILIRUB SERPL-MCNC: 0.6 MG/DL (ref 0.2–1)
BILIRUB SERPL-MCNC: 0.6 MG/DL (ref 0.2–1)
BILIRUB SERPL-MCNC: 0.68 MG/DL (ref 0.2–1)
BILIRUB SERPL-MCNC: 0.7 MG/DL (ref 0.2–1)
BILIRUB SERPL-MCNC: 0.77 MG/DL (ref 0.2–1)
BILIRUB SERPL-MCNC: 0.8 MG/DL (ref 0.2–1)
BILIRUB SERPL-MCNC: 0.9 MG/DL (ref 0.2–1)
BILIRUB SERPL-MCNC: 1.32 MG/DL (ref 0.2–1)
BILIRUB SERPL-MCNC: 1.37 MG/DL (ref 0.2–1)
BILIRUB SERPL-MCNC: 1.77 MG/DL (ref 0.2–1)
BILIRUB SERPL-MCNC: 4.4 MG/DL (ref 0.2–1)
BILIRUB SERPL-MCNC: 4.85 MG/DL (ref 0.2–1)
BILIRUB UR QL STRIP: ABNORMAL
BILIRUB UR QL STRIP: NEGATIVE
BLD GP AB SCN SERPL QL: NEGATIVE
BPU ID: NORMAL
BUN SERPL-MCNC: 11 MG/DL (ref 5–25)
BUN SERPL-MCNC: 11 MG/DL (ref 5–25)
BUN SERPL-MCNC: 12 MG/DL (ref 5–25)
BUN SERPL-MCNC: 13 MG/DL (ref 5–25)
BUN SERPL-MCNC: 14 MG/DL (ref 5–25)
BUN SERPL-MCNC: 15 MG/DL (ref 5–25)
BUN SERPL-MCNC: 17 MG/DL (ref 5–25)
BUN SERPL-MCNC: 17 MG/DL (ref 5–25)
BUN SERPL-MCNC: 18 MG/DL (ref 5–25)
BUN SERPL-MCNC: 20 MG/DL (ref 5–25)
BUN SERPL-MCNC: 24 MG/DL (ref 5–25)
BUN SERPL-MCNC: 27 MG/DL (ref 5–25)
CALCIUM ALBUM COR SERPL-MCNC: 10 MG/DL (ref 8.3–10.1)
CALCIUM ALBUM COR SERPL-MCNC: 10.2 MG/DL (ref 8.3–10.1)
CALCIUM ALBUM COR SERPL-MCNC: 10.2 MG/DL (ref 8.3–10.1)
CALCIUM ALBUM COR SERPL-MCNC: 10.3 MG/DL (ref 8.3–10.1)
CALCIUM ALBUM COR SERPL-MCNC: 10.3 MG/DL (ref 8.3–10.1)
CALCIUM ALBUM COR SERPL-MCNC: 10.4 MG/DL (ref 8.3–10.1)
CALCIUM ALBUM COR SERPL-MCNC: 10.7 MG/DL (ref 8.3–10.1)
CALCIUM ALBUM COR SERPL-MCNC: 10.7 MG/DL (ref 8.3–10.1)
CALCIUM ALBUM COR SERPL-MCNC: 11 MG/DL (ref 8.3–10.1)
CALCIUM ALBUM COR SERPL-MCNC: 9.6 MG/DL (ref 8.3–10.1)
CALCIUM ALBUM COR SERPL-MCNC: 9.6 MG/DL (ref 8.3–10.1)
CALCIUM ALBUM COR SERPL-MCNC: 9.7 MG/DL (ref 8.3–10.1)
CALCIUM ALBUM COR SERPL-MCNC: 9.8 MG/DL (ref 8.3–10.1)
CALCIUM SERPL-MCNC: 10.1 MG/DL (ref 8.3–10.1)
CALCIUM SERPL-MCNC: 10.1 MG/DL (ref 8.3–10.1)
CALCIUM SERPL-MCNC: 8.2 MG/DL (ref 8.3–10.1)
CALCIUM SERPL-MCNC: 8.3 MG/DL (ref 8.3–10.1)
CALCIUM SERPL-MCNC: 8.4 MG/DL (ref 8.3–10.1)
CALCIUM SERPL-MCNC: 8.6 MG/DL (ref 8.3–10.1)
CALCIUM SERPL-MCNC: 8.7 MG/DL (ref 8.3–10.1)
CALCIUM SERPL-MCNC: 8.8 MG/DL (ref 8.3–10.1)
CALCIUM SERPL-MCNC: 8.8 MG/DL (ref 8.3–10.1)
CALCIUM SERPL-MCNC: 8.9 MG/DL (ref 8.3–10.1)
CALCIUM SERPL-MCNC: 9.1 MG/DL (ref 8.3–10.1)
CALCIUM SERPL-MCNC: 9.3 MG/DL (ref 8.3–10.1)
CALCIUM SERPL-MCNC: 9.4 MG/DL (ref 8.3–10.1)
CALCIUM SERPL-MCNC: 9.7 MG/DL (ref 8.3–10.1)
CANCER AG19-9 SERPL-ACNC: 2536 U/ML (ref 0–35)
CHLORIDE SERPL-SCNC: 100 MMOL/L (ref 100–108)
CHLORIDE SERPL-SCNC: 100 MMOL/L (ref 100–108)
CHLORIDE SERPL-SCNC: 101 MMOL/L (ref 100–108)
CHLORIDE SERPL-SCNC: 102 MMOL/L (ref 100–108)
CHLORIDE SERPL-SCNC: 102 MMOL/L (ref 100–108)
CHLORIDE SERPL-SCNC: 103 MMOL/L (ref 100–108)
CHLORIDE SERPL-SCNC: 104 MMOL/L (ref 100–108)
CHLORIDE SERPL-SCNC: 93 MMOL/L (ref 100–108)
CHLORIDE SERPL-SCNC: 94 MMOL/L (ref 100–108)
CHLORIDE SERPL-SCNC: 94 MMOL/L (ref 100–108)
CHLORIDE SERPL-SCNC: 95 MMOL/L (ref 100–108)
CHLORIDE SERPL-SCNC: 95 MMOL/L (ref 100–108)
CHLORIDE SERPL-SCNC: 96 MMOL/L (ref 100–108)
CHLORIDE SERPL-SCNC: 98 MMOL/L (ref 100–108)
CHLORIDE SERPL-SCNC: 98 MMOL/L (ref 100–108)
CHLORIDE SERPL-SCNC: 99 MMOL/L (ref 100–108)
CLARITY UR: CLEAR
CLARITY UR: CLEAR
CO2 SERPL-SCNC: 23 MMOL/L (ref 21–32)
CO2 SERPL-SCNC: 27 MMOL/L (ref 21–32)
CO2 SERPL-SCNC: 28 MMOL/L (ref 21–32)
CO2 SERPL-SCNC: 28 MMOL/L (ref 21–32)
CO2 SERPL-SCNC: 29 MMOL/L (ref 21–32)
CO2 SERPL-SCNC: 30 MMOL/L (ref 21–32)
CO2 SERPL-SCNC: 31 MMOL/L (ref 21–32)
CO2 SERPL-SCNC: 31 MMOL/L (ref 21–32)
CO2 SERPL-SCNC: 32 MMOL/L (ref 21–32)
CO2 SERPL-SCNC: 32 MMOL/L (ref 21–32)
CO2 SERPL-SCNC: 33 MMOL/L (ref 21–32)
CO2 SERPL-SCNC: 34 MMOL/L (ref 21–32)
COLOR UR: ABNORMAL
COLOR UR: YELLOW
CREAT SERPL-MCNC: 0.68 MG/DL (ref 0.6–1.3)
CREAT SERPL-MCNC: 0.72 MG/DL (ref 0.6–1.3)
CREAT SERPL-MCNC: 0.73 MG/DL (ref 0.6–1.3)
CREAT SERPL-MCNC: 0.75 MG/DL (ref 0.6–1.3)
CREAT SERPL-MCNC: 0.79 MG/DL (ref 0.6–1.3)
CREAT SERPL-MCNC: 0.81 MG/DL (ref 0.6–1.3)
CREAT SERPL-MCNC: 0.84 MG/DL (ref 0.6–1.3)
CREAT SERPL-MCNC: 0.85 MG/DL (ref 0.6–1.3)
CREAT SERPL-MCNC: 0.94 MG/DL (ref 0.6–1.3)
CREAT SERPL-MCNC: 0.98 MG/DL (ref 0.6–1.3)
CREAT SERPL-MCNC: 0.99 MG/DL (ref 0.6–1.3)
CREAT SERPL-MCNC: 1.01 MG/DL (ref 0.6–1.3)
CREAT SERPL-MCNC: 1.02 MG/DL (ref 0.6–1.3)
CREAT SERPL-MCNC: 1.03 MG/DL (ref 0.6–1.3)
CREAT SERPL-MCNC: 1.13 MG/DL (ref 0.6–1.3)
CREAT SERPL-MCNC: 1.15 MG/DL (ref 0.6–1.3)
CREAT SERPL-MCNC: 1.15 MG/DL (ref 0.6–1.3)
CREAT SERPL-MCNC: 1.26 MG/DL (ref 0.6–1.3)
CREAT SERPL-MCNC: 1.46 MG/DL (ref 0.6–1.3)
CREAT SERPL-MCNC: 1.48 MG/DL (ref 0.6–1.3)
CREAT SERPL-MCNC: 1.48 MG/DL (ref 0.6–1.3)
CROSSMATCH: NORMAL
DACRYOCYTES BLD QL SMEAR: PRESENT
EOSINOPHIL # BLD AUTO: 0.01 THOUSAND/ΜL (ref 0–0.61)
EOSINOPHIL # BLD AUTO: 0.02 THOUSAND/ΜL (ref 0–0.61)
EOSINOPHIL # BLD AUTO: 0.02 THOUSAND/ΜL (ref 0–0.61)
EOSINOPHIL # BLD AUTO: 0.03 THOUSAND/ΜL (ref 0–0.61)
EOSINOPHIL # BLD AUTO: 0.05 THOUSAND/ΜL (ref 0–0.61)
EOSINOPHIL # BLD AUTO: 0.06 THOUSAND/ΜL (ref 0–0.61)
EOSINOPHIL # BLD MANUAL: 0 THOUSAND/UL (ref 0–0.4)
EOSINOPHIL # BLD MANUAL: 0.17 THOUSAND/UL (ref 0–0.4)
EOSINOPHIL NFR BLD AUTO: 0 % (ref 0–6)
EOSINOPHIL NFR BLD AUTO: 1 % (ref 0–6)
EOSINOPHIL NFR BLD MANUAL: 0 % (ref 0–6)
EOSINOPHIL NFR BLD MANUAL: 1 % (ref 0–6)
ERYTHROCYTE [DISTWIDTH] IN BLOOD BY AUTOMATED COUNT: 12.8 % (ref 11.6–15.1)
ERYTHROCYTE [DISTWIDTH] IN BLOOD BY AUTOMATED COUNT: 13.4 % (ref 11.6–15.1)
ERYTHROCYTE [DISTWIDTH] IN BLOOD BY AUTOMATED COUNT: 13.8 % (ref 11.6–15.1)
ERYTHROCYTE [DISTWIDTH] IN BLOOD BY AUTOMATED COUNT: 14 % (ref 11.6–15.1)
ERYTHROCYTE [DISTWIDTH] IN BLOOD BY AUTOMATED COUNT: 14.2 % (ref 11.6–15.1)
ERYTHROCYTE [DISTWIDTH] IN BLOOD BY AUTOMATED COUNT: 14.9 % (ref 11.6–15.1)
ERYTHROCYTE [DISTWIDTH] IN BLOOD BY AUTOMATED COUNT: 15.1 % (ref 11.6–15.1)
ERYTHROCYTE [DISTWIDTH] IN BLOOD BY AUTOMATED COUNT: 17.2 % (ref 11.6–15.1)
ERYTHROCYTE [DISTWIDTH] IN BLOOD BY AUTOMATED COUNT: 18.1 % (ref 11.6–15.1)
ERYTHROCYTE [DISTWIDTH] IN BLOOD BY AUTOMATED COUNT: 18.2 % (ref 11.6–15.1)
ERYTHROCYTE [DISTWIDTH] IN BLOOD BY AUTOMATED COUNT: 19.3 % (ref 11.6–15.1)
ERYTHROCYTE [DISTWIDTH] IN BLOOD BY AUTOMATED COUNT: 19.6 % (ref 11.6–15.1)
ERYTHROCYTE [DISTWIDTH] IN BLOOD BY AUTOMATED COUNT: 19.8 % (ref 11.6–15.1)
ERYTHROCYTE [DISTWIDTH] IN BLOOD BY AUTOMATED COUNT: 19.9 % (ref 11.6–15.1)
ERYTHROCYTE [DISTWIDTH] IN BLOOD BY AUTOMATED COUNT: 20.2 % (ref 11.6–15.1)
ERYTHROCYTE [DISTWIDTH] IN BLOOD BY AUTOMATED COUNT: 20.2 % (ref 11.6–15.1)
ERYTHROCYTE [DISTWIDTH] IN BLOOD BY AUTOMATED COUNT: 20.5 % (ref 11.6–15.1)
ERYTHROCYTE [DISTWIDTH] IN BLOOD BY AUTOMATED COUNT: 20.9 % (ref 11.6–15.1)
ERYTHROCYTE [DISTWIDTH] IN BLOOD BY AUTOMATED COUNT: 21.2 % (ref 11.6–15.1)
ERYTHROCYTE [DISTWIDTH] IN BLOOD BY AUTOMATED COUNT: 21.3 % (ref 11.6–15.1)
FERRITIN SERPL-MCNC: 911 NG/ML (ref 8–388)
FOLATE SERPL-MCNC: 8.3 NG/ML (ref 3.1–17.5)
GFR SERPL CREATININE-BSD FRML MDRD: 44 ML/MIN/1.73SQ M
GFR SERPL CREATININE-BSD FRML MDRD: 44 ML/MIN/1.73SQ M
GFR SERPL CREATININE-BSD FRML MDRD: 45 ML/MIN/1.73SQ M
GFR SERPL CREATININE-BSD FRML MDRD: 54 ML/MIN/1.73SQ M
GFR SERPL CREATININE-BSD FRML MDRD: 61 ML/MIN/1.73SQ M
GFR SERPL CREATININE-BSD FRML MDRD: 61 ML/MIN/1.73SQ M
GFR SERPL CREATININE-BSD FRML MDRD: 62 ML/MIN/1.73SQ M
GFR SERPL CREATININE-BSD FRML MDRD: 69 ML/MIN/1.73SQ M
GFR SERPL CREATININE-BSD FRML MDRD: 70 ML/MIN/1.73SQ M
GFR SERPL CREATININE-BSD FRML MDRD: 71 ML/MIN/1.73SQ M
GFR SERPL CREATININE-BSD FRML MDRD: 73 ML/MIN/1.73SQ M
GFR SERPL CREATININE-BSD FRML MDRD: 74 ML/MIN/1.73SQ M
GFR SERPL CREATININE-BSD FRML MDRD: 77 ML/MIN/1.73SQ M
GFR SERPL CREATININE-BSD FRML MDRD: 84 ML/MIN/1.73SQ M
GFR SERPL CREATININE-BSD FRML MDRD: 84 ML/MIN/1.73SQ M
GFR SERPL CREATININE-BSD FRML MDRD: 85 ML/MIN/1.73SQ M
GFR SERPL CREATININE-BSD FRML MDRD: 86 ML/MIN/1.73SQ M
GFR SERPL CREATININE-BSD FRML MDRD: 88 ML/MIN/1.73SQ M
GFR SERPL CREATININE-BSD FRML MDRD: 89 ML/MIN/1.73SQ M
GFR SERPL CREATININE-BSD FRML MDRD: 89 ML/MIN/1.73SQ M
GFR SERPL CREATININE-BSD FRML MDRD: 92 ML/MIN/1.73SQ M
GLUCOSE P FAST SERPL-MCNC: 124 MG/DL (ref 65–99)
GLUCOSE P FAST SERPL-MCNC: 159 MG/DL (ref 65–99)
GLUCOSE P FAST SERPL-MCNC: 164 MG/DL (ref 65–99)
GLUCOSE P FAST SERPL-MCNC: 213 MG/DL (ref 65–99)
GLUCOSE P FAST SERPL-MCNC: 83 MG/DL (ref 65–99)
GLUCOSE SERPL-MCNC: 100 MG/DL (ref 65–140)
GLUCOSE SERPL-MCNC: 106 MG/DL (ref 65–140)
GLUCOSE SERPL-MCNC: 107 MG/DL (ref 65–140)
GLUCOSE SERPL-MCNC: 107 MG/DL (ref 65–140)
GLUCOSE SERPL-MCNC: 112 MG/DL (ref 65–140)
GLUCOSE SERPL-MCNC: 116 MG/DL (ref 65–140)
GLUCOSE SERPL-MCNC: 120 MG/DL (ref 65–140)
GLUCOSE SERPL-MCNC: 122 MG/DL (ref 65–140)
GLUCOSE SERPL-MCNC: 133 MG/DL (ref 65–140)
GLUCOSE SERPL-MCNC: 133 MG/DL (ref 65–140)
GLUCOSE SERPL-MCNC: 152 MG/DL (ref 65–140)
GLUCOSE SERPL-MCNC: 153 MG/DL (ref 65–140)
GLUCOSE SERPL-MCNC: 161 MG/DL (ref 65–140)
GLUCOSE SERPL-MCNC: 169 MG/DL (ref 65–140)
GLUCOSE SERPL-MCNC: 176 MG/DL (ref 65–140)
GLUCOSE SERPL-MCNC: 83 MG/DL (ref 65–140)
GLUCOSE SERPL-MCNC: 89 MG/DL (ref 65–140)
GLUCOSE UR STRIP-MCNC: NEGATIVE MG/DL
GLUCOSE UR STRIP-MCNC: NEGATIVE MG/DL
GRAM STN SPEC: ABNORMAL
GRAM STN SPEC: ABNORMAL
HCT VFR BLD AUTO: 21 % (ref 36.5–49.3)
HCT VFR BLD AUTO: 21.4 % (ref 36.5–49.3)
HCT VFR BLD AUTO: 23.4 % (ref 36.5–49.3)
HCT VFR BLD AUTO: 24.6 % (ref 36.5–49.3)
HCT VFR BLD AUTO: 24.8 % (ref 36.5–49.3)
HCT VFR BLD AUTO: 24.9 % (ref 36.5–49.3)
HCT VFR BLD AUTO: 25 % (ref 36.5–49.3)
HCT VFR BLD AUTO: 25.6 % (ref 36.5–49.3)
HCT VFR BLD AUTO: 25.7 % (ref 36.5–49.3)
HCT VFR BLD AUTO: 26.6 % (ref 36.5–49.3)
HCT VFR BLD AUTO: 27.2 % (ref 36.5–49.3)
HCT VFR BLD AUTO: 29 % (ref 36.5–49.3)
HCT VFR BLD AUTO: 29.3 % (ref 36.5–49.3)
HCT VFR BLD AUTO: 29.5 % (ref 36.5–49.3)
HCT VFR BLD AUTO: 31.6 % (ref 36.5–49.3)
HCT VFR BLD AUTO: 31.6 % (ref 36.5–49.3)
HCT VFR BLD AUTO: 31.7 % (ref 36.5–49.3)
HCT VFR BLD AUTO: 33.2 % (ref 36.5–49.3)
HEMOCCULT STL QL IA: POSITIVE
HGB BLD-MCNC: 10.1 G/DL (ref 12–17)
HGB BLD-MCNC: 10.2 G/DL (ref 12–17)
HGB BLD-MCNC: 10.2 G/DL (ref 12–17)
HGB BLD-MCNC: 10.5 G/DL (ref 12–17)
HGB BLD-MCNC: 6.6 G/DL (ref 12–17)
HGB BLD-MCNC: 7 G/DL (ref 12–17)
HGB BLD-MCNC: 7.1 G/DL (ref 12–17)
HGB BLD-MCNC: 7.4 G/DL (ref 12–17)
HGB BLD-MCNC: 7.6 G/DL (ref 12–17)
HGB BLD-MCNC: 7.7 G/DL (ref 12–17)
HGB BLD-MCNC: 7.7 G/DL (ref 12–17)
HGB BLD-MCNC: 7.8 G/DL (ref 12–17)
HGB BLD-MCNC: 7.8 G/DL (ref 12–17)
HGB BLD-MCNC: 7.9 G/DL (ref 12–17)
HGB BLD-MCNC: 7.9 G/DL (ref 12–17)
HGB BLD-MCNC: 8 G/DL (ref 12–17)
HGB BLD-MCNC: 8.6 G/DL (ref 12–17)
HGB BLD-MCNC: 8.9 G/DL (ref 12–17)
HGB BLD-MCNC: 9.6 G/DL (ref 12–17)
HGB BLD-MCNC: 9.7 G/DL (ref 12–17)
HGB UR QL STRIP.AUTO: NEGATIVE
HGB UR QL STRIP.AUTO: NEGATIVE
HYPERCHROMIA BLD QL SMEAR: PRESENT
IMM GRANULOCYTES # BLD AUTO: 0.01 THOUSAND/UL (ref 0–0.2)
IMM GRANULOCYTES # BLD AUTO: 0.02 THOUSAND/UL (ref 0–0.2)
IMM GRANULOCYTES # BLD AUTO: 0.02 THOUSAND/UL (ref 0–0.2)
IMM GRANULOCYTES # BLD AUTO: 0.03 THOUSAND/UL (ref 0–0.2)
IMM GRANULOCYTES # BLD AUTO: 0.03 THOUSAND/UL (ref 0–0.2)
IMM GRANULOCYTES # BLD AUTO: 0.04 THOUSAND/UL (ref 0–0.2)
IMM GRANULOCYTES # BLD AUTO: 0.07 THOUSAND/UL (ref 0–0.2)
IMM GRANULOCYTES # BLD AUTO: 0.13 THOUSAND/UL (ref 0–0.2)
IMM GRANULOCYTES # BLD AUTO: 0.26 THOUSAND/UL (ref 0–0.2)
IMM GRANULOCYTES # BLD AUTO: 0.34 THOUSAND/UL (ref 0–0.2)
IMM GRANULOCYTES # BLD AUTO: 0.35 THOUSAND/UL (ref 0–0.2)
IMM GRANULOCYTES # BLD AUTO: 0.39 THOUSAND/UL (ref 0–0.2)
IMM GRANULOCYTES NFR BLD AUTO: 0 % (ref 0–2)
IMM GRANULOCYTES NFR BLD AUTO: 1 % (ref 0–2)
IMM GRANULOCYTES NFR BLD AUTO: 2 % (ref 0–2)
INR PPP: 1.18 (ref 0.84–1.19)
INR PPP: 1.24 (ref 0.84–1.19)
IRON SATN MFR SERPL: 21 % (ref 20–50)
IRON SERPL-MCNC: 42 UG/DL (ref 65–175)
KETONES UR STRIP-MCNC: ABNORMAL MG/DL
KETONES UR STRIP-MCNC: NEGATIVE MG/DL
LACTATE SERPL-SCNC: 1.8 MMOL/L (ref 0.5–2)
LEUKOCYTE ESTERASE UR QL STRIP: NEGATIVE
LEUKOCYTE ESTERASE UR QL STRIP: NEGATIVE
LG PLATELETS BLD QL SMEAR: PRESENT
LIPASE SERPL-CCNC: 18 U/L (ref 73–393)
LYMPHOCYTES # BLD AUTO: 0 % (ref 14–44)
LYMPHOCYTES # BLD AUTO: 0 THOUSAND/UL (ref 0.6–4.47)
LYMPHOCYTES # BLD AUTO: 0.37 THOUSANDS/ΜL (ref 0.6–4.47)
LYMPHOCYTES # BLD AUTO: 0.4 THOUSANDS/ΜL (ref 0.6–4.47)
LYMPHOCYTES # BLD AUTO: 0.44 THOUSANDS/ΜL (ref 0.6–4.47)
LYMPHOCYTES # BLD AUTO: 0.48 THOUSAND/UL (ref 0.6–4.47)
LYMPHOCYTES # BLD AUTO: 0.55 THOUSANDS/ΜL (ref 0.6–4.47)
LYMPHOCYTES # BLD AUTO: 0.62 THOUSANDS/ΜL (ref 0.6–4.47)
LYMPHOCYTES # BLD AUTO: 0.63 THOUSAND/UL (ref 0.6–4.47)
LYMPHOCYTES # BLD AUTO: 0.63 THOUSANDS/ΜL (ref 0.6–4.47)
LYMPHOCYTES # BLD AUTO: 0.64 THOUSANDS/ΜL (ref 0.6–4.47)
LYMPHOCYTES # BLD AUTO: 0.71 THOUSANDS/ΜL (ref 0.6–4.47)
LYMPHOCYTES # BLD AUTO: 0.79 THOUSANDS/ΜL (ref 0.6–4.47)
LYMPHOCYTES # BLD AUTO: 0.94 THOUSAND/UL (ref 0.6–4.47)
LYMPHOCYTES # BLD AUTO: 0.96 THOUSANDS/ΜL (ref 0.6–4.47)
LYMPHOCYTES # BLD AUTO: 1 THOUSANDS/ΜL (ref 0.6–4.47)
LYMPHOCYTES # BLD AUTO: 1.16 THOUSANDS/ΜL (ref 0.6–4.47)
LYMPHOCYTES # BLD AUTO: 1.41 THOUSAND/UL (ref 0.6–4.47)
LYMPHOCYTES # BLD AUTO: 10 % (ref 14–44)
LYMPHOCYTES # BLD AUTO: 3 % (ref 14–44)
LYMPHOCYTES # BLD AUTO: 4 % (ref 14–44)
LYMPHOCYTES # BLD AUTO: 6 % (ref 14–44)
LYMPHOCYTES NFR BLD AUTO: 10 % (ref 14–44)
LYMPHOCYTES NFR BLD AUTO: 15 % (ref 14–44)
LYMPHOCYTES NFR BLD AUTO: 18 % (ref 14–44)
LYMPHOCYTES NFR BLD AUTO: 21 % (ref 14–44)
LYMPHOCYTES NFR BLD AUTO: 4 % (ref 14–44)
LYMPHOCYTES NFR BLD AUTO: 4 % (ref 14–44)
LYMPHOCYTES NFR BLD AUTO: 5 % (ref 14–44)
LYMPHOCYTES NFR BLD AUTO: 6 % (ref 14–44)
LYMPHOCYTES NFR BLD AUTO: 6 % (ref 14–44)
LYMPHOCYTES NFR BLD AUTO: 7 % (ref 14–44)
LYMPHOCYTES NFR BLD AUTO: 8 % (ref 14–44)
LYMPHOCYTES NFR BLD AUTO: 9 % (ref 14–44)
MACROCYTES BLD QL AUTO: PRESENT
MAGNESIUM SERPL-MCNC: 1.7 MG/DL (ref 1.6–2.6)
MAGNESIUM SERPL-MCNC: 1.8 MG/DL (ref 1.6–2.6)
MCH RBC QN AUTO: 29.5 PG (ref 26.8–34.3)
MCH RBC QN AUTO: 29.6 PG (ref 26.8–34.3)
MCH RBC QN AUTO: 29.7 PG (ref 26.8–34.3)
MCH RBC QN AUTO: 29.8 PG (ref 26.8–34.3)
MCH RBC QN AUTO: 30 PG (ref 26.8–34.3)
MCH RBC QN AUTO: 30 PG (ref 26.8–34.3)
MCH RBC QN AUTO: 30.2 PG (ref 26.8–34.3)
MCH RBC QN AUTO: 30.3 PG (ref 26.8–34.3)
MCH RBC QN AUTO: 30.3 PG (ref 26.8–34.3)
MCH RBC QN AUTO: 30.4 PG (ref 26.8–34.3)
MCH RBC QN AUTO: 30.5 PG (ref 26.8–34.3)
MCH RBC QN AUTO: 30.5 PG (ref 26.8–34.3)
MCH RBC QN AUTO: 30.7 PG (ref 26.8–34.3)
MCH RBC QN AUTO: 30.8 PG (ref 26.8–34.3)
MCH RBC QN AUTO: 31.1 PG (ref 26.8–34.3)
MCH RBC QN AUTO: 31.1 PG (ref 26.8–34.3)
MCH RBC QN AUTO: 31.4 PG (ref 26.8–34.3)
MCHC RBC AUTO-ENTMCNC: 29.7 G/DL (ref 31.4–37.4)
MCHC RBC AUTO-ENTMCNC: 30 G/DL (ref 31.4–37.4)
MCHC RBC AUTO-ENTMCNC: 30.1 G/DL (ref 31.4–37.4)
MCHC RBC AUTO-ENTMCNC: 30.3 G/DL (ref 31.4–37.4)
MCHC RBC AUTO-ENTMCNC: 30.4 G/DL (ref 31.4–37.4)
MCHC RBC AUTO-ENTMCNC: 30.7 G/DL (ref 31.4–37.4)
MCHC RBC AUTO-ENTMCNC: 30.8 G/DL (ref 31.4–37.4)
MCHC RBC AUTO-ENTMCNC: 31.2 G/DL (ref 31.4–37.4)
MCHC RBC AUTO-ENTMCNC: 31.3 G/DL (ref 31.4–37.4)
MCHC RBC AUTO-ENTMCNC: 31.3 G/DL (ref 31.4–37.4)
MCHC RBC AUTO-ENTMCNC: 31.4 G/DL (ref 31.4–37.4)
MCHC RBC AUTO-ENTMCNC: 31.6 G/DL (ref 31.4–37.4)
MCHC RBC AUTO-ENTMCNC: 31.6 G/DL (ref 31.4–37.4)
MCHC RBC AUTO-ENTMCNC: 31.9 G/DL (ref 31.4–37.4)
MCHC RBC AUTO-ENTMCNC: 32 G/DL (ref 31.4–37.4)
MCHC RBC AUTO-ENTMCNC: 32.2 G/DL (ref 31.4–37.4)
MCHC RBC AUTO-ENTMCNC: 32.3 G/DL (ref 31.4–37.4)
MCHC RBC AUTO-ENTMCNC: 32.7 G/DL (ref 31.4–37.4)
MCHC RBC AUTO-ENTMCNC: 32.8 G/DL (ref 31.4–37.4)
MCHC RBC AUTO-ENTMCNC: 32.9 G/DL (ref 31.4–37.4)
MCV RBC AUTO: 100 FL (ref 82–98)
MCV RBC AUTO: 101 FL (ref 82–98)
MCV RBC AUTO: 102 FL (ref 82–98)
MCV RBC AUTO: 103 FL (ref 82–98)
MCV RBC AUTO: 103 FL (ref 82–98)
MCV RBC AUTO: 90 FL (ref 82–98)
MCV RBC AUTO: 92 FL (ref 82–98)
MCV RBC AUTO: 93 FL (ref 82–98)
MCV RBC AUTO: 93 FL (ref 82–98)
MCV RBC AUTO: 94 FL (ref 82–98)
MCV RBC AUTO: 95 FL (ref 82–98)
MCV RBC AUTO: 96 FL (ref 82–98)
MCV RBC AUTO: 96 FL (ref 82–98)
MCV RBC AUTO: 98 FL (ref 82–98)
MCV RBC AUTO: 98 FL (ref 82–98)
MCV RBC AUTO: 99 FL (ref 82–98)
MICROCYTES BLD QL AUTO: PRESENT
MONOCYTES # BLD AUTO: 0.09 THOUSAND/ΜL (ref 0.17–1.22)
MONOCYTES # BLD AUTO: 0.32 THOUSAND/UL (ref 0–1.22)
MONOCYTES # BLD AUTO: 0.35 THOUSAND/ΜL (ref 0.17–1.22)
MONOCYTES # BLD AUTO: 0.48 THOUSAND/ΜL (ref 0.17–1.22)
MONOCYTES # BLD AUTO: 0.51 THOUSAND/ΜL (ref 0.17–1.22)
MONOCYTES # BLD AUTO: 0.53 THOUSAND/ΜL (ref 0.17–1.22)
MONOCYTES # BLD AUTO: 0.72 THOUSAND/ΜL (ref 0.17–1.22)
MONOCYTES # BLD AUTO: 0.81 THOUSAND/UL (ref 0–1.22)
MONOCYTES # BLD AUTO: 1.02 THOUSAND/ΜL (ref 0.17–1.22)
MONOCYTES # BLD AUTO: 1.12 THOUSAND/ΜL (ref 0.17–1.22)
MONOCYTES # BLD AUTO: 1.15 THOUSAND/ΜL (ref 0.17–1.22)
MONOCYTES # BLD AUTO: 1.17 THOUSAND/UL (ref 0–1.22)
MONOCYTES # BLD AUTO: 1.19 THOUSAND/UL (ref 0–1.22)
MONOCYTES # BLD AUTO: 1.23 THOUSAND/ΜL (ref 0.17–1.22)
MONOCYTES # BLD AUTO: 1.32 THOUSAND/ΜL (ref 0.17–1.22)
MONOCYTES # BLD AUTO: 1.35 THOUSAND/ΜL (ref 0.17–1.22)
MONOCYTES # BLD AUTO: 2.69 THOUSAND/UL (ref 0–1.22)
MONOCYTES NFR BLD AUTO: 10 % (ref 4–12)
MONOCYTES NFR BLD AUTO: 10 % (ref 4–12)
MONOCYTES NFR BLD AUTO: 11 % (ref 4–12)
MONOCYTES NFR BLD AUTO: 14 % (ref 4–12)
MONOCYTES NFR BLD AUTO: 16 % (ref 4–12)
MONOCYTES NFR BLD AUTO: 2 % (ref 4–12)
MONOCYTES NFR BLD AUTO: 6 % (ref 4–12)
MONOCYTES NFR BLD AUTO: 6 % (ref 4–12)
MONOCYTES NFR BLD AUTO: 7 % (ref 4–12)
MONOCYTES NFR BLD AUTO: 9 % (ref 4–12)
MONOCYTES NFR BLD: 19 % (ref 4–12)
MONOCYTES NFR BLD: 3 % (ref 4–12)
MONOCYTES NFR BLD: 5 % (ref 4–12)
MONOCYTES NFR BLD: 5 % (ref 4–12)
MONOCYTES NFR BLD: 7 % (ref 4–12)
MUCOUS THREADS UR QL AUTO: ABNORMAL
MUCOUS THREADS UR QL AUTO: ABNORMAL
MYELOCYTES NFR BLD MANUAL: 1 % (ref 0–1)
MYELOCYTES NFR BLD MANUAL: 2 % (ref 0–1)
NEUTROPHILS # BLD AUTO: 11.85 THOUSANDS/ΜL (ref 1.85–7.62)
NEUTROPHILS # BLD AUTO: 12.63 THOUSANDS/ΜL (ref 1.85–7.62)
NEUTROPHILS # BLD AUTO: 14.65 THOUSANDS/ΜL (ref 1.85–7.62)
NEUTROPHILS # BLD AUTO: 15.14 THOUSANDS/ΜL (ref 1.85–7.62)
NEUTROPHILS # BLD AUTO: 15.37 THOUSANDS/ΜL (ref 1.85–7.62)
NEUTROPHILS # BLD AUTO: 2.33 THOUSANDS/ΜL (ref 1.85–7.62)
NEUTROPHILS # BLD AUTO: 2.53 THOUSANDS/ΜL (ref 1.85–7.62)
NEUTROPHILS # BLD AUTO: 2.81 THOUSANDS/ΜL (ref 1.85–7.62)
NEUTROPHILS # BLD AUTO: 3.91 THOUSANDS/ΜL (ref 1.85–7.62)
NEUTROPHILS # BLD AUTO: 4.63 THOUSANDS/ΜL (ref 1.85–7.62)
NEUTROPHILS # BLD AUTO: 4.76 THOUSANDS/ΜL (ref 1.85–7.62)
NEUTROPHILS # BLD AUTO: 8.27 THOUSANDS/ΜL (ref 1.85–7.62)
NEUTROPHILS # BLD MANUAL: 14.68 THOUSAND/UL (ref 1.85–7.62)
NEUTROPHILS # BLD MANUAL: 15.65 THOUSAND/UL (ref 1.85–7.62)
NEUTROPHILS # BLD MANUAL: 21.3 THOUSAND/UL (ref 1.85–7.62)
NEUTROPHILS # BLD MANUAL: 9.58 THOUSAND/UL (ref 1.85–7.62)
NEUTROPHILS # BLD MANUAL: 9.76 THOUSAND/UL (ref 1.85–7.62)
NEUTS BAND NFR BLD MANUAL: 1 % (ref 0–8)
NEUTS BAND NFR BLD MANUAL: 17 % (ref 0–8)
NEUTS BAND NFR BLD MANUAL: 2 % (ref 0–8)
NEUTS BAND NFR BLD MANUAL: 3 % (ref 0–8)
NEUTS BAND NFR BLD MANUAL: 4 % (ref 0–8)
NEUTS SEG NFR BLD AUTO: 63 % (ref 43–75)
NEUTS SEG NFR BLD AUTO: 66 % (ref 43–75)
NEUTS SEG NFR BLD AUTO: 67 % (ref 43–75)
NEUTS SEG NFR BLD AUTO: 69 % (ref 43–75)
NEUTS SEG NFR BLD AUTO: 75 % (ref 43–75)
NEUTS SEG NFR BLD AUTO: 78 % (ref 43–75)
NEUTS SEG NFR BLD AUTO: 80 % (ref 43–75)
NEUTS SEG NFR BLD AUTO: 82 % (ref 43–75)
NEUTS SEG NFR BLD AUTO: 83 % (ref 43–75)
NEUTS SEG NFR BLD AUTO: 84 % (ref 43–75)
NEUTS SEG NFR BLD AUTO: 85 % (ref 43–75)
NEUTS SEG NFR BLD AUTO: 87 % (ref 43–75)
NEUTS SEG NFR BLD AUTO: 88 % (ref 43–75)
NEUTS SEG NFR BLD AUTO: 90 % (ref 43–75)
NITRITE UR QL STRIP: NEGATIVE
NITRITE UR QL STRIP: NEGATIVE
NON-SQ EPI CELLS URNS QL MICRO: ABNORMAL /HPF
NON-SQ EPI CELLS URNS QL MICRO: ABNORMAL /HPF
NRBC BLD AUTO-RTO: 0 /100 WBCS
OVALOCYTES BLD QL SMEAR: PRESENT
PH UR STRIP.AUTO: 6 [PH]
PH UR STRIP.AUTO: 7 [PH]
PHOSPHATE SERPL-MCNC: 3 MG/DL (ref 2.3–4.1)
PLATELET # BLD AUTO: 144 THOUSANDS/UL (ref 149–390)
PLATELET # BLD AUTO: 162 THOUSANDS/UL (ref 149–390)
PLATELET # BLD AUTO: 162 THOUSANDS/UL (ref 149–390)
PLATELET # BLD AUTO: 168 THOUSANDS/UL (ref 149–390)
PLATELET # BLD AUTO: 182 THOUSANDS/UL (ref 149–390)
PLATELET # BLD AUTO: 209 THOUSANDS/UL (ref 149–390)
PLATELET # BLD AUTO: 213 THOUSANDS/UL (ref 149–390)
PLATELET # BLD AUTO: 226 THOUSANDS/UL (ref 149–390)
PLATELET # BLD AUTO: 234 THOUSANDS/UL (ref 149–390)
PLATELET # BLD AUTO: 269 THOUSANDS/UL (ref 149–390)
PLATELET # BLD AUTO: 293 THOUSANDS/UL (ref 149–390)
PLATELET # BLD AUTO: 311 THOUSANDS/UL (ref 149–390)
PLATELET # BLD AUTO: 316 THOUSANDS/UL (ref 149–390)
PLATELET # BLD AUTO: 320 THOUSANDS/UL (ref 149–390)
PLATELET # BLD AUTO: 325 THOUSANDS/UL (ref 149–390)
PLATELET # BLD AUTO: 333 THOUSANDS/UL (ref 149–390)
PLATELET # BLD AUTO: 337 THOUSANDS/UL (ref 149–390)
PLATELET # BLD AUTO: 365 THOUSANDS/UL (ref 149–390)
PLATELET # BLD AUTO: 425 THOUSANDS/UL (ref 149–390)
PLATELET # BLD AUTO: 546 THOUSANDS/UL (ref 149–390)
PLATELET BLD QL SMEAR: ADEQUATE
PMV BLD AUTO: 10 FL (ref 8.9–12.7)
PMV BLD AUTO: 10.3 FL (ref 8.9–12.7)
PMV BLD AUTO: 10.4 FL (ref 8.9–12.7)
PMV BLD AUTO: 10.5 FL (ref 8.9–12.7)
PMV BLD AUTO: 10.6 FL (ref 8.9–12.7)
PMV BLD AUTO: 10.6 FL (ref 8.9–12.7)
PMV BLD AUTO: 10.7 FL (ref 8.9–12.7)
PMV BLD AUTO: 10.8 FL (ref 8.9–12.7)
PMV BLD AUTO: 10.8 FL (ref 8.9–12.7)
PMV BLD AUTO: 11 FL (ref 8.9–12.7)
PMV BLD AUTO: 11.1 FL (ref 8.9–12.7)
PMV BLD AUTO: 11.2 FL (ref 8.9–12.7)
PMV BLD AUTO: 11.3 FL (ref 8.9–12.7)
PMV BLD AUTO: 11.3 FL (ref 8.9–12.7)
PMV BLD AUTO: 11.9 FL (ref 8.9–12.7)
PMV BLD AUTO: 9.8 FL (ref 8.9–12.7)
POIKILOCYTOSIS BLD QL SMEAR: PRESENT
POIKILOCYTOSIS BLD QL SMEAR: PRESENT
POLYCHROMASIA BLD QL SMEAR: PRESENT
POTASSIUM SERPL-SCNC: 3.3 MMOL/L (ref 3.5–5.3)
POTASSIUM SERPL-SCNC: 3.3 MMOL/L (ref 3.5–5.3)
POTASSIUM SERPL-SCNC: 3.4 MMOL/L (ref 3.5–5.3)
POTASSIUM SERPL-SCNC: 3.4 MMOL/L (ref 3.5–5.3)
POTASSIUM SERPL-SCNC: 3.5 MMOL/L (ref 3.5–5.3)
POTASSIUM SERPL-SCNC: 3.6 MMOL/L (ref 3.5–5.3)
POTASSIUM SERPL-SCNC: 3.7 MMOL/L (ref 3.5–5.3)
POTASSIUM SERPL-SCNC: 3.8 MMOL/L (ref 3.5–5.3)
POTASSIUM SERPL-SCNC: 3.8 MMOL/L (ref 3.5–5.3)
POTASSIUM SERPL-SCNC: 3.9 MMOL/L (ref 3.5–5.3)
POTASSIUM SERPL-SCNC: 4 MMOL/L (ref 3.5–5.3)
POTASSIUM SERPL-SCNC: 4.1 MMOL/L (ref 3.5–5.3)
POTASSIUM SERPL-SCNC: 4.3 MMOL/L (ref 3.5–5.3)
POTASSIUM SERPL-SCNC: 4.3 MMOL/L (ref 3.5–5.3)
POTASSIUM SERPL-SCNC: 4.8 MMOL/L (ref 3.5–5.3)
POTASSIUM SERPL-SCNC: 5.4 MMOL/L (ref 3.5–5.3)
PROCALCITONIN SERPL-MCNC: 0.35 NG/ML
PROT SERPL-MCNC: 6.2 G/DL (ref 6.4–8.2)
PROT SERPL-MCNC: 6.2 G/DL (ref 6.4–8.2)
PROT SERPL-MCNC: 6.4 G/DL (ref 6.4–8.2)
PROT SERPL-MCNC: 6.7 G/DL (ref 6.4–8.2)
PROT SERPL-MCNC: 6.7 G/DL (ref 6.4–8.2)
PROT SERPL-MCNC: 6.8 G/DL (ref 6.4–8.2)
PROT SERPL-MCNC: 7 G/DL (ref 6.4–8.2)
PROT SERPL-MCNC: 7.1 G/DL (ref 6.4–8.2)
PROT SERPL-MCNC: 7.3 G/DL (ref 6.4–8.2)
PROT SERPL-MCNC: 7.4 G/DL (ref 6.4–8.2)
PROT SERPL-MCNC: 8 G/DL (ref 6.4–8.2)
PROT SERPL-MCNC: 8.1 G/DL (ref 6.4–8.2)
PROT SERPL-MCNC: 8.1 G/DL (ref 6.4–8.2)
PROT UR STRIP-MCNC: ABNORMAL MG/DL
PROT UR STRIP-MCNC: ABNORMAL MG/DL
PROTHROMBIN TIME: 14.5 SECONDS (ref 11.6–14.5)
PROTHROMBIN TIME: 15.1 SECONDS (ref 11.6–14.5)
RBC # BLD AUTO: 2.2 MILLION/UL (ref 3.88–5.62)
RBC # BLD AUTO: 2.33 MILLION/UL (ref 3.88–5.62)
RBC # BLD AUTO: 2.34 MILLION/UL (ref 3.88–5.62)
RBC # BLD AUTO: 2.43 MILLION/UL (ref 3.88–5.62)
RBC # BLD AUTO: 2.49 MILLION/UL (ref 3.88–5.62)
RBC # BLD AUTO: 2.5 MILLION/UL (ref 3.88–5.62)
RBC # BLD AUTO: 2.5 MILLION/UL (ref 3.88–5.62)
RBC # BLD AUTO: 2.51 MILLION/UL (ref 3.88–5.62)
RBC # BLD AUTO: 2.61 MILLION/UL (ref 3.88–5.62)
RBC # BLD AUTO: 2.61 MILLION/UL (ref 3.88–5.62)
RBC # BLD AUTO: 2.62 MILLION/UL (ref 3.88–5.62)
RBC # BLD AUTO: 2.66 MILLION/UL (ref 3.88–5.62)
RBC # BLD AUTO: 2.79 MILLION/UL (ref 3.88–5.62)
RBC # BLD AUTO: 2.83 MILLION/UL (ref 3.88–5.62)
RBC # BLD AUTO: 3.12 MILLION/UL (ref 3.88–5.62)
RBC # BLD AUTO: 3.25 MILLION/UL (ref 3.88–5.62)
RBC # BLD AUTO: 3.31 MILLION/UL (ref 3.88–5.62)
RBC # BLD AUTO: 3.36 MILLION/UL (ref 3.88–5.62)
RBC # BLD AUTO: 3.41 MILLION/UL (ref 3.88–5.62)
RBC # BLD AUTO: 3.48 MILLION/UL (ref 3.88–5.62)
RBC #/AREA URNS AUTO: ABNORMAL /HPF
RBC #/AREA URNS AUTO: ABNORMAL /HPF
RBC MORPH BLD: PRESENT
RH BLD: POSITIVE
SODIUM SERPL-SCNC: 129 MMOL/L (ref 136–145)
SODIUM SERPL-SCNC: 130 MMOL/L (ref 136–145)
SODIUM SERPL-SCNC: 131 MMOL/L (ref 136–145)
SODIUM SERPL-SCNC: 132 MMOL/L (ref 136–145)
SODIUM SERPL-SCNC: 133 MMOL/L (ref 136–145)
SODIUM SERPL-SCNC: 134 MMOL/L (ref 136–145)
SODIUM SERPL-SCNC: 135 MMOL/L (ref 136–145)
SODIUM SERPL-SCNC: 136 MMOL/L (ref 136–145)
SODIUM SERPL-SCNC: 137 MMOL/L (ref 136–145)
SODIUM SERPL-SCNC: 138 MMOL/L (ref 136–145)
SODIUM SERPL-SCNC: 138 MMOL/L (ref 136–145)
SP GR UR STRIP.AUTO: 1.02 (ref 1–1.03)
SP GR UR STRIP.AUTO: 1.02 (ref 1–1.03)
SPECIMEN EXPIRATION DATE: NORMAL
STOMATOCYTES BLD QL SMEAR: PRESENT
TIBC SERPL-MCNC: 199 UG/DL (ref 250–450)
UNIT DISPENSE STATUS: NORMAL
UNIT PRODUCT CODE: NORMAL
UNIT PRODUCT VOLUME: 350 ML
UNIT RH: NORMAL
UROBILINOGEN UR QL STRIP.AUTO: >=8 E.U./DL
UROBILINOGEN UR STRIP-ACNC: 3 MG/DL
VIT B12 SERPL-MCNC: >6000 PG/ML (ref 100–900)
WBC # BLD AUTO: 10.53 THOUSAND/UL (ref 4.31–10.16)
WBC # BLD AUTO: 10.55 THOUSAND/UL (ref 4.31–10.16)
WBC # BLD AUTO: 13.99 THOUSAND/UL (ref 4.31–10.16)
WBC # BLD AUTO: 14.14 THOUSAND/UL (ref 4.31–10.16)
WBC # BLD AUTO: 15.39 THOUSAND/UL (ref 4.31–10.16)
WBC # BLD AUTO: 15.53 THOUSAND/UL (ref 4.31–10.16)
WBC # BLD AUTO: 16.13 THOUSAND/UL (ref 4.31–10.16)
WBC # BLD AUTO: 16.73 THOUSAND/UL (ref 4.31–10.16)
WBC # BLD AUTO: 17.01 THOUSAND/UL (ref 4.31–10.16)
WBC # BLD AUTO: 17.45 THOUSAND/UL (ref 4.31–10.16)
WBC # BLD AUTO: 18.04 THOUSAND/UL (ref 4.31–10.16)
WBC # BLD AUTO: 18.06 THOUSAND/UL (ref 4.31–10.16)
WBC # BLD AUTO: 23.41 THOUSAND/UL (ref 4.31–10.16)
WBC # BLD AUTO: 3.46 THOUSAND/UL (ref 4.31–10.16)
WBC # BLD AUTO: 3.67 THOUSAND/UL (ref 4.31–10.16)
WBC # BLD AUTO: 4.43 THOUSAND/UL (ref 4.31–10.16)
WBC # BLD AUTO: 4.92 THOUSAND/UL (ref 4.31–10.16)
WBC # BLD AUTO: 5.32 THOUSAND/UL (ref 4.31–10.16)
WBC # BLD AUTO: 5.48 THOUSAND/UL (ref 4.31–10.16)
WBC # BLD AUTO: 5.57 THOUSAND/UL (ref 4.31–10.16)
WBC #/AREA URNS AUTO: ABNORMAL /HPF
WBC #/AREA URNS AUTO: ABNORMAL /HPF

## 2022-01-01 PROCEDURE — 85027 COMPLETE CBC AUTOMATED: CPT | Performed by: FAMILY MEDICINE

## 2022-01-01 PROCEDURE — 10330064 URINAL, W/TRANSPARENT LID (50/CS)

## 2022-01-01 PROCEDURE — 85027 COMPLETE CBC AUTOMATED: CPT | Performed by: STUDENT IN AN ORGANIZED HEALTH CARE EDUCATION/TRAINING PROGRAM

## 2022-01-01 PROCEDURE — 99232 SBSQ HOSP IP/OBS MODERATE 35: CPT | Performed by: INTERNAL MEDICINE

## 2022-01-01 PROCEDURE — G0299 HHS/HOSPICE OF RN EA 15 MIN: HCPCS

## 2022-01-01 PROCEDURE — 82746 ASSAY OF FOLIC ACID SERUM: CPT | Performed by: STUDENT IN AN ORGANIZED HEALTH CARE EDUCATION/TRAINING PROGRAM

## 2022-01-01 PROCEDURE — 96374 THER/PROPH/DIAG INJ IV PUSH: CPT

## 2022-01-01 PROCEDURE — 77001 FLUOROGUIDE FOR VEIN DEVICE: CPT | Performed by: INTERNAL MEDICINE

## 2022-01-01 PROCEDURE — 88363 XM ARCHIVE TISSUE MOLEC ANAL: CPT | Performed by: PATHOLOGY

## 2022-01-01 PROCEDURE — 86900 BLOOD TYPING SEROLOGIC ABO: CPT | Performed by: INTERNAL MEDICINE

## 2022-01-01 PROCEDURE — G0156 HHCP-SVS OF AIDE,EA 15 MIN: HCPCS

## 2022-01-01 PROCEDURE — 99203 OFFICE O/P NEW LOW 30 MIN: CPT | Performed by: NURSE PRACTITIONER

## 2022-01-01 PROCEDURE — 99214 OFFICE O/P EST MOD 30 MIN: CPT | Performed by: INTERNAL MEDICINE

## 2022-01-01 PROCEDURE — 96413 CHEMO IV INFUSION 1 HR: CPT

## 2022-01-01 PROCEDURE — 86850 RBC ANTIBODY SCREEN: CPT

## 2022-01-01 PROCEDURE — C1769 GUIDE WIRE: HCPCS

## 2022-01-01 PROCEDURE — 86900 BLOOD TYPING SEROLOGIC ABO: CPT

## 2022-01-01 PROCEDURE — 99153 MOD SED SAME PHYS/QHP EA: CPT

## 2022-01-01 PROCEDURE — 85027 COMPLETE CBC AUTOMATED: CPT | Performed by: INTERNAL MEDICINE

## 2022-01-01 PROCEDURE — 96377 APPLICATON ON-BODY INJECTOR: CPT

## 2022-01-01 PROCEDURE — 99152 MOD SED SAME PHYS/QHP 5/>YRS: CPT

## 2022-01-01 PROCEDURE — T2042 HOSPICE ROUTINE HOME CARE: HCPCS

## 2022-01-01 PROCEDURE — 85007 BL SMEAR W/DIFF WBC COUNT: CPT

## 2022-01-01 PROCEDURE — 80048 BASIC METABOLIC PNL TOTAL CA: CPT | Performed by: INTERNAL MEDICINE

## 2022-01-01 PROCEDURE — 71250 CT THORAX DX C-: CPT

## 2022-01-01 PROCEDURE — 80053 COMPREHEN METABOLIC PANEL: CPT | Performed by: FAMILY MEDICINE

## 2022-01-01 PROCEDURE — 80053 COMPREHEN METABOLIC PANEL: CPT | Performed by: INTERNAL MEDICINE

## 2022-01-01 PROCEDURE — 99214 OFFICE O/P EST MOD 30 MIN: CPT | Performed by: PHYSICIAN ASSISTANT

## 2022-01-01 PROCEDURE — 99284 EMERGENCY DEPT VISIT MOD MDM: CPT

## 2022-01-01 PROCEDURE — 10330064 UNDERPAD, REUSE 36X36 1DZ     BECKCL

## 2022-01-01 PROCEDURE — 85027 COMPLETE CBC AUTOMATED: CPT

## 2022-01-01 PROCEDURE — 36415 COLL VENOUS BLD VENIPUNCTURE: CPT

## 2022-01-01 PROCEDURE — 74177 CT ABD & PELVIS W/CONTRAST: CPT

## 2022-01-01 PROCEDURE — 85025 COMPLETE CBC W/AUTO DIFF WBC: CPT | Performed by: INTERNAL MEDICINE

## 2022-01-01 PROCEDURE — 86850 RBC ANTIBODY SCREEN: CPT | Performed by: INTERNAL MEDICINE

## 2022-01-01 PROCEDURE — 85007 BL SMEAR W/DIFF WBC COUNT: CPT | Performed by: STUDENT IN AN ORGANIZED HEALTH CARE EDUCATION/TRAINING PROGRAM

## 2022-01-01 PROCEDURE — 86920 COMPATIBILITY TEST SPIN: CPT

## 2022-01-01 PROCEDURE — 43249 ESOPH EGD DILATION <30 MM: CPT | Performed by: INTERNAL MEDICINE

## 2022-01-01 PROCEDURE — 80053 COMPREHEN METABOLIC PANEL: CPT

## 2022-01-01 PROCEDURE — C1788 PORT, INDWELLING, IMP: HCPCS

## 2022-01-01 PROCEDURE — 36561 INSERT TUNNELED CV CATH: CPT | Performed by: INTERNAL MEDICINE

## 2022-01-01 PROCEDURE — 83735 ASSAY OF MAGNESIUM: CPT | Performed by: INTERNAL MEDICINE

## 2022-01-01 PROCEDURE — 96367 TX/PROPH/DG ADDL SEQ IV INF: CPT

## 2022-01-01 PROCEDURE — 80048 BASIC METABOLIC PNL TOTAL CA: CPT | Performed by: STUDENT IN AN ORGANIZED HEALTH CARE EDUCATION/TRAINING PROGRAM

## 2022-01-01 PROCEDURE — 45330 DIAGNOSTIC SIGMOIDOSCOPY: CPT | Performed by: INTERNAL MEDICINE

## 2022-01-01 PROCEDURE — 99220 PR INITIAL OBSERVATION CARE/DAY 70 MINUTES: CPT | Performed by: INTERNAL MEDICINE

## 2022-01-01 PROCEDURE — 85730 THROMBOPLASTIN TIME PARTIAL: CPT | Performed by: EMERGENCY MEDICINE

## 2022-01-01 PROCEDURE — G0328 FECAL BLOOD SCRN IMMUNOASSAY: HCPCS

## 2022-01-01 PROCEDURE — 96365 THER/PROPH/DIAG IV INF INIT: CPT

## 2022-01-01 PROCEDURE — P9040 RBC LEUKOREDUCED IRRADIATED: HCPCS

## 2022-01-01 PROCEDURE — 88172 CYTP DX EVAL FNA 1ST EA SITE: CPT | Performed by: PATHOLOGY

## 2022-01-01 PROCEDURE — 86301 IMMUNOASSAY TUMOR CA 19-9: CPT | Performed by: STUDENT IN AN ORGANIZED HEALTH CARE EDUCATION/TRAINING PROGRAM

## 2022-01-01 PROCEDURE — 96417 CHEMO IV INFUS EACH ADDL SEQ: CPT

## 2022-01-01 PROCEDURE — 96361 HYDRATE IV INFUSION ADD-ON: CPT

## 2022-01-01 PROCEDURE — 36430 TRANSFUSION BLD/BLD COMPNT: CPT

## 2022-01-01 PROCEDURE — 86901 BLOOD TYPING SEROLOGIC RH(D): CPT

## 2022-01-01 PROCEDURE — 96375 TX/PRO/DX INJ NEW DRUG ADDON: CPT

## 2022-01-01 PROCEDURE — 99222 1ST HOSP IP/OBS MODERATE 55: CPT | Performed by: INTERNAL MEDICINE

## 2022-01-01 PROCEDURE — 99215 OFFICE O/P EST HI 40 MIN: CPT | Performed by: PHYSICIAN ASSISTANT

## 2022-01-01 PROCEDURE — 80053 COMPREHEN METABOLIC PANEL: CPT | Performed by: STUDENT IN AN ORGANIZED HEALTH CARE EDUCATION/TRAINING PROGRAM

## 2022-01-01 PROCEDURE — 76937 US GUIDE VASCULAR ACCESS: CPT

## 2022-01-01 PROCEDURE — 86923 COMPATIBILITY TEST ELECTRIC: CPT

## 2022-01-01 PROCEDURE — 0FBG8ZX EXCISION OF PANCREAS, VIA NATURAL OR ARTIFICIAL OPENING ENDOSCOPIC, DIAGNOSTIC: ICD-10-PCS | Performed by: INTERNAL MEDICINE

## 2022-01-01 PROCEDURE — 97166 OT EVAL MOD COMPLEX 45 MIN: CPT

## 2022-01-01 PROCEDURE — C1892 INTRO/SHEATH,FIXED,PEEL-AWAY: HCPCS

## 2022-01-01 PROCEDURE — 85025 COMPLETE CBC W/AUTO DIFF WBC: CPT

## 2022-01-01 PROCEDURE — 76937 US GUIDE VASCULAR ACCESS: CPT | Performed by: INTERNAL MEDICINE

## 2022-01-01 PROCEDURE — 83605 ASSAY OF LACTIC ACID: CPT | Performed by: EMERGENCY MEDICINE

## 2022-01-01 PROCEDURE — 10330064 PILLOW, HEEL POLY/COTTON BLU  HERMEL

## 2022-01-01 PROCEDURE — 83690 ASSAY OF LIPASE: CPT

## 2022-01-01 PROCEDURE — G1004 CDSM NDSC: HCPCS

## 2022-01-01 PROCEDURE — 99285 EMERGENCY DEPT VISIT HI MDM: CPT

## 2022-01-01 PROCEDURE — 43266 EGD ENDOSCOPIC STENT PLACE: CPT | Performed by: INTERNAL MEDICINE

## 2022-01-01 PROCEDURE — G0498 CHEMO EXTEND IV INFUS W/PUMP: HCPCS

## 2022-01-01 PROCEDURE — 82248 BILIRUBIN DIRECT: CPT | Performed by: STUDENT IN AN ORGANIZED HEALTH CARE EDUCATION/TRAINING PROGRAM

## 2022-01-01 PROCEDURE — 99239 HOSP IP/OBS DSCHRG MGMT >30: CPT | Performed by: NURSE PRACTITIONER

## 2022-01-01 PROCEDURE — 10330057 MEDICATION, GENERAL

## 2022-01-01 PROCEDURE — 36561 INSERT TUNNELED CV CATH: CPT

## 2022-01-01 PROCEDURE — 99213 OFFICE O/P EST LOW 20 MIN: CPT | Performed by: NURSE PRACTITIONER

## 2022-01-01 PROCEDURE — 83550 IRON BINDING TEST: CPT | Performed by: INTERNAL MEDICINE

## 2022-01-01 PROCEDURE — 96415 CHEMO IV INFUSION ADDL HR: CPT

## 2022-01-01 PROCEDURE — 74176 CT ABD & PELVIS W/O CONTRAST: CPT

## 2022-01-01 PROCEDURE — 99232 SBSQ HOSP IP/OBS MODERATE 35: CPT | Performed by: FAMILY MEDICINE

## 2022-01-01 PROCEDURE — 99215 OFFICE O/P EST HI 40 MIN: CPT | Performed by: NURSE PRACTITIONER

## 2022-01-01 PROCEDURE — 80076 HEPATIC FUNCTION PANEL: CPT | Performed by: INTERNAL MEDICINE

## 2022-01-01 PROCEDURE — 99239 HOSP IP/OBS DSCHRG MGMT >30: CPT | Performed by: INTERNAL MEDICINE

## 2022-01-01 PROCEDURE — 84100 ASSAY OF PHOSPHORUS: CPT | Performed by: INTERNAL MEDICINE

## 2022-01-01 PROCEDURE — 81001 URINALYSIS AUTO W/SCOPE: CPT | Performed by: EMERGENCY MEDICINE

## 2022-01-01 PROCEDURE — 87040 BLOOD CULTURE FOR BACTERIA: CPT | Performed by: EMERGENCY MEDICINE

## 2022-01-01 PROCEDURE — 99285 EMERGENCY DEPT VISIT HI MDM: CPT | Performed by: EMERGENCY MEDICINE

## 2022-01-01 PROCEDURE — 85007 BL SMEAR W/DIFF WBC COUNT: CPT | Performed by: INTERNAL MEDICINE

## 2022-01-01 PROCEDURE — 74018 RADEX ABDOMEN 1 VIEW: CPT

## 2022-01-01 PROCEDURE — 0D798DZ DILATION OF DUODENUM WITH INTRALUMINAL DEVICE, VIA NATURAL OR ARTIFICIAL OPENING ENDOSCOPIC: ICD-10-PCS | Performed by: INTERNAL MEDICINE

## 2022-01-01 PROCEDURE — 99223 1ST HOSP IP/OBS HIGH 75: CPT | Performed by: INTERNAL MEDICINE

## 2022-01-01 PROCEDURE — 85610 PROTHROMBIN TIME: CPT | Performed by: INTERNAL MEDICINE

## 2022-01-01 PROCEDURE — 0F798DZ DILATION OF COMMON BILE DUCT WITH INTRALUMINAL DEVICE, VIA NATURAL OR ARTIFICIAL OPENING ENDOSCOPIC: ICD-10-PCS | Performed by: INTERNAL MEDICINE

## 2022-01-01 PROCEDURE — 43242 EGD US FINE NEEDLE BX/ASPIR: CPT | Performed by: INTERNAL MEDICINE

## 2022-01-01 PROCEDURE — C1874 STENT, COATED/COV W/DEL SYS: HCPCS

## 2022-01-01 PROCEDURE — C2617 STENT, NON-COR, TEM W/O DEL: HCPCS

## 2022-01-01 PROCEDURE — 88173 CYTOPATH EVAL FNA REPORT: CPT | Performed by: PATHOLOGY

## 2022-01-01 PROCEDURE — 74240 X-RAY XM UPR GI TRC 1CNTRST: CPT

## 2022-01-01 PROCEDURE — 74328 X-RAY BILE DUCT ENDOSCOPY: CPT

## 2022-01-01 PROCEDURE — 10330087 HSPC SERVICE INTENSITY ADD-ON

## 2022-01-01 PROCEDURE — 88305 TISSUE EXAM BY PATHOLOGIST: CPT | Performed by: PATHOLOGY

## 2022-01-01 PROCEDURE — 99152 MOD SED SAME PHYS/QHP 5/>YRS: CPT | Performed by: INTERNAL MEDICINE

## 2022-01-01 PROCEDURE — 0FB23ZX EXCISION OF LEFT LOBE LIVER, PERCUTANEOUS APPROACH, DIAGNOSTIC: ICD-10-PCS | Performed by: INTERNAL MEDICINE

## 2022-01-01 PROCEDURE — 87154 CUL TYP ID BLD PTHGN 6+ TRGT: CPT | Performed by: EMERGENCY MEDICINE

## 2022-01-01 PROCEDURE — 74248 X-RAY SM INT F-THRU STD: CPT

## 2022-01-01 PROCEDURE — 99204 OFFICE O/P NEW MOD 45 MIN: CPT | Performed by: INTERNAL MEDICINE

## 2022-01-01 PROCEDURE — 10330064 BRIEF, WINGS CHOICE XLG (15/BG4BG/CS) KE

## 2022-01-01 PROCEDURE — 77001 FLUOROGUIDE FOR VEIN DEVICE: CPT

## 2022-01-01 PROCEDURE — 10330064 BEDPAN, PONTOON GRAPHITE 55OZ (20/CS)

## 2022-01-01 PROCEDURE — 85730 THROMBOPLASTIN TIME PARTIAL: CPT | Performed by: INTERNAL MEDICINE

## 2022-01-01 PROCEDURE — 10330064 ALIGNER, FOAM BODY SM (8/CS)

## 2022-01-01 PROCEDURE — 86901 BLOOD TYPING SEROLOGIC RH(D): CPT | Performed by: INTERNAL MEDICINE

## 2022-01-01 PROCEDURE — 99212 OFFICE O/P EST SF 10 MIN: CPT | Performed by: NURSE PRACTITIONER

## 2022-01-01 PROCEDURE — 87040 BLOOD CULTURE FOR BACTERIA: CPT | Performed by: INTERNAL MEDICINE

## 2022-01-01 PROCEDURE — 99255 IP/OBS CONSLTJ NEW/EST HI 80: CPT | Performed by: STUDENT IN AN ORGANIZED HEALTH CARE EDUCATION/TRAINING PROGRAM

## 2022-01-01 PROCEDURE — 83540 ASSAY OF IRON: CPT | Performed by: INTERNAL MEDICINE

## 2022-01-01 PROCEDURE — 97163 PT EVAL HIGH COMPLEX 45 MIN: CPT

## 2022-01-01 PROCEDURE — 71045 X-RAY EXAM CHEST 1 VIEW: CPT

## 2022-01-01 PROCEDURE — 99215 OFFICE O/P EST HI 40 MIN: CPT | Performed by: INTERNAL MEDICINE

## 2022-01-01 PROCEDURE — 84145 PROCALCITONIN (PCT): CPT | Performed by: EMERGENCY MEDICINE

## 2022-01-01 PROCEDURE — C1726 CATH, BAL DIL, NON-VASCULAR: HCPCS

## 2022-01-01 PROCEDURE — 85025 COMPLETE CBC W/AUTO DIFF WBC: CPT | Performed by: STUDENT IN AN ORGANIZED HEALTH CARE EDUCATION/TRAINING PROGRAM

## 2022-01-01 PROCEDURE — 85610 PROTHROMBIN TIME: CPT | Performed by: EMERGENCY MEDICINE

## 2022-01-01 PROCEDURE — NC001 PR NO CHARGE

## 2022-01-01 PROCEDURE — 87077 CULTURE AEROBIC IDENTIFY: CPT | Performed by: EMERGENCY MEDICINE

## 2022-01-01 PROCEDURE — 43235 EGD DIAGNOSTIC BRUSH WASH: CPT | Performed by: INTERNAL MEDICINE

## 2022-01-01 PROCEDURE — 82728 ASSAY OF FERRITIN: CPT | Performed by: INTERNAL MEDICINE

## 2022-01-01 PROCEDURE — 82607 VITAMIN B-12: CPT | Performed by: STUDENT IN AN ORGANIZED HEALTH CARE EDUCATION/TRAINING PROGRAM

## 2022-01-01 PROCEDURE — 81001 URINALYSIS AUTO W/SCOPE: CPT

## 2022-01-01 PROCEDURE — 43274 ERCP DUCT STENT PLACEMENT: CPT | Performed by: INTERNAL MEDICINE

## 2022-01-01 PROCEDURE — G0155 HHCP-SVS OF CSW,EA 15 MIN: HCPCS

## 2022-01-01 RX ORDER — POLYETHYLENE GLYCOL 3350 17 G/17G
17 POWDER, FOR SOLUTION ORAL ONCE
Status: DISCONTINUED | OUTPATIENT
Start: 2022-01-01 | End: 2022-01-01 | Stop reason: HOSPADM

## 2022-01-01 RX ORDER — PROPOFOL 10 MG/ML
INJECTION, EMULSION INTRAVENOUS CONTINUOUS PRN
Status: DISCONTINUED | OUTPATIENT
Start: 2022-01-01 | End: 2022-01-01

## 2022-01-01 RX ORDER — OXYCODONE HYDROCHLORIDE 5 MG/1
5 TABLET ORAL EVERY 4 HOURS PRN
Qty: 30 TABLET | Refills: 0 | Status: SHIPPED | OUTPATIENT
Start: 2022-01-01 | End: 2022-01-01 | Stop reason: SDUPTHER

## 2022-01-01 RX ORDER — SODIUM CHLORIDE 9 MG/ML
20 INJECTION, SOLUTION INTRAVENOUS ONCE
Status: CANCELLED | OUTPATIENT
Start: 2022-01-01

## 2022-01-01 RX ORDER — PANTOPRAZOLE SODIUM 20 MG/1
20 TABLET, DELAYED RELEASE ORAL
Status: DISCONTINUED | OUTPATIENT
Start: 2022-01-01 | End: 2022-01-01 | Stop reason: HOSPADM

## 2022-01-01 RX ORDER — ACETAMINOPHEN 325 MG/1
650 TABLET ORAL ONCE
Status: COMPLETED | OUTPATIENT
Start: 2022-01-01 | End: 2022-01-01

## 2022-01-01 RX ORDER — DEXTROSE MONOHYDRATE 50 MG/ML
20 INJECTION, SOLUTION INTRAVENOUS ONCE
Status: COMPLETED | OUTPATIENT
Start: 2022-01-01 | End: 2022-01-01

## 2022-01-01 RX ORDER — DEXTROSE MONOHYDRATE 50 MG/ML
20 INJECTION, SOLUTION INTRAVENOUS ONCE
Status: CANCELLED | OUTPATIENT
Start: 2022-01-01

## 2022-01-01 RX ORDER — ACETAMINOPHEN 325 MG/1
650 TABLET ORAL ONCE
Status: CANCELLED
Start: 2022-01-01 | End: 2022-01-01

## 2022-01-01 RX ORDER — SODIUM CHLORIDE 9 MG/ML
20 INJECTION, SOLUTION INTRAVENOUS ONCE AS NEEDED
Status: DISCONTINUED | OUTPATIENT
Start: 2022-01-01 | End: 2022-01-01 | Stop reason: HOSPADM

## 2022-01-01 RX ORDER — METOCLOPRAMIDE HYDROCHLORIDE 5 MG/ML
10 INJECTION INTRAMUSCULAR; INTRAVENOUS ONCE
Status: COMPLETED | OUTPATIENT
Start: 2022-01-01 | End: 2022-01-01

## 2022-01-01 RX ORDER — SENNOSIDES 8.6 MG
8.6 TABLET ORAL
Qty: 30 TABLET | Refills: 0 | Status: SHIPPED | OUTPATIENT
Start: 2022-01-01 | End: 2022-01-01 | Stop reason: SDUPTHER

## 2022-01-01 RX ORDER — ACETAMINOPHEN 325 MG/1
650 TABLET ORAL EVERY 6 HOURS PRN
Status: CANCELLED
Start: 2022-01-01

## 2022-01-01 RX ORDER — FENTANYL CITRATE 50 UG/ML
INJECTION, SOLUTION INTRAMUSCULAR; INTRAVENOUS AS NEEDED
Status: DISCONTINUED | OUTPATIENT
Start: 2022-01-01 | End: 2022-01-01

## 2022-01-01 RX ORDER — SIMETHICONE 20 MG/.3ML
80 EMULSION ORAL ONCE
Status: COMPLETED | OUTPATIENT
Start: 2022-01-01 | End: 2022-01-01

## 2022-01-01 RX ORDER — ONDANSETRON 2 MG/ML
INJECTION INTRAMUSCULAR; INTRAVENOUS AS NEEDED
Status: DISCONTINUED | OUTPATIENT
Start: 2022-01-01 | End: 2022-01-01

## 2022-01-01 RX ORDER — LISINOPRIL 10 MG/1
10 TABLET ORAL
Status: DISCONTINUED | OUTPATIENT
Start: 2022-01-01 | End: 2022-01-01 | Stop reason: HOSPADM

## 2022-01-01 RX ORDER — PROPOFOL 10 MG/ML
INJECTION, EMULSION INTRAVENOUS AS NEEDED
Status: DISCONTINUED | OUTPATIENT
Start: 2022-01-01 | End: 2022-01-01

## 2022-01-01 RX ORDER — SODIUM CHLORIDE 9 MG/ML
INJECTION, SOLUTION INTRAVENOUS CONTINUOUS PRN
Status: DISCONTINUED | OUTPATIENT
Start: 2022-01-01 | End: 2022-01-01

## 2022-01-01 RX ORDER — POTASSIUM CHLORIDE 20 MEQ/1
40 TABLET, EXTENDED RELEASE ORAL ONCE
Status: COMPLETED | OUTPATIENT
Start: 2022-01-01 | End: 2022-01-01

## 2022-01-01 RX ORDER — ONDANSETRON 2 MG/ML
4 INJECTION INTRAMUSCULAR; INTRAVENOUS ONCE
Status: COMPLETED | OUTPATIENT
Start: 2022-01-01 | End: 2022-01-01

## 2022-01-01 RX ORDER — SODIUM CHLORIDE 9 MG/ML
20 INJECTION, SOLUTION INTRAVENOUS ONCE
Status: COMPLETED | OUTPATIENT
Start: 2022-01-01 | End: 2022-01-01

## 2022-01-01 RX ORDER — DOCUSATE SODIUM 100 MG/1
100 CAPSULE, LIQUID FILLED ORAL 2 TIMES DAILY PRN
Status: DISCONTINUED | OUTPATIENT
Start: 2022-01-01 | End: 2022-01-01 | Stop reason: HOSPADM

## 2022-01-01 RX ORDER — OXYCODONE HYDROCHLORIDE 10 MG/1
10 TABLET ORAL EVERY 4 HOURS PRN
Status: DISCONTINUED | OUTPATIENT
Start: 2022-01-01 | End: 2022-01-01 | Stop reason: HOSPADM

## 2022-01-01 RX ORDER — OXYCODONE HYDROCHLORIDE 5 MG/1
2.5 TABLET ORAL EVERY 4 HOURS PRN
Status: DISCONTINUED | OUTPATIENT
Start: 2022-01-01 | End: 2022-01-01 | Stop reason: HOSPADM

## 2022-01-01 RX ORDER — MIRTAZAPINE 7.5 MG/1
7.5 TABLET, FILM COATED ORAL
Qty: 30 TABLET | Refills: 0 | Status: SHIPPED | OUTPATIENT
Start: 2022-01-01 | End: 2022-01-01 | Stop reason: SDUPTHER

## 2022-01-01 RX ORDER — ONDANSETRON 2 MG/ML
4 INJECTION INTRAMUSCULAR; INTRAVENOUS EVERY 6 HOURS PRN
Status: DISCONTINUED | OUTPATIENT
Start: 2022-01-01 | End: 2022-01-01 | Stop reason: HOSPADM

## 2022-01-01 RX ORDER — SUCCINYLCHOLINE/SOD CL,ISO/PF 100 MG/5ML
SYRINGE (ML) INTRAVENOUS AS NEEDED
Status: DISCONTINUED | OUTPATIENT
Start: 2022-01-01 | End: 2022-01-01

## 2022-01-01 RX ORDER — NOREPINEPHRINE BITARTRATE 1 MG/ML
INJECTION, SOLUTION INTRAVENOUS AS NEEDED
Status: DISCONTINUED | OUTPATIENT
Start: 2022-01-01 | End: 2022-01-01

## 2022-01-01 RX ORDER — SODIUM CHLORIDE 9 MG/ML
20 INJECTION, SOLUTION INTRAVENOUS ONCE AS NEEDED
Status: CANCELLED | OUTPATIENT
Start: 2022-01-01

## 2022-01-01 RX ORDER — HYDROMORPHONE HCL IN WATER/PF 6 MG/30 ML
0.2 PATIENT CONTROLLED ANALGESIA SYRINGE INTRAVENOUS
Status: DISCONTINUED | OUTPATIENT
Start: 2022-01-01 | End: 2022-01-01

## 2022-01-01 RX ORDER — MIDAZOLAM HYDROCHLORIDE 2 MG/2ML
INJECTION, SOLUTION INTRAMUSCULAR; INTRAVENOUS CODE/TRAUMA/SEDATION MEDICATION
Status: DISCONTINUED | OUTPATIENT
Start: 2022-01-01 | End: 2022-01-01 | Stop reason: HOSPADM

## 2022-01-01 RX ORDER — HYDROMORPHONE HCL/PF 1 MG/ML
0.5 SYRINGE (ML) INJECTION EVERY 4 HOURS PRN
Status: DISCONTINUED | OUTPATIENT
Start: 2022-01-01 | End: 2022-01-01 | Stop reason: HOSPADM

## 2022-01-01 RX ORDER — FUROSEMIDE 10 MG/ML
20 INJECTION INTRAMUSCULAR; INTRAVENOUS ONCE AS NEEDED
Status: CANCELLED
Start: 2022-01-01

## 2022-01-01 RX ORDER — POLYETHYLENE GLYCOL 3350 17 G/17G
17 POWDER, FOR SOLUTION ORAL DAILY
Qty: 10 EACH | Refills: 0 | Status: SHIPPED | OUTPATIENT
Start: 2022-01-01

## 2022-01-01 RX ORDER — SENNOSIDES 8.6 MG
1 TABLET ORAL
Status: DISCONTINUED | OUTPATIENT
Start: 2022-01-01 | End: 2022-01-01 | Stop reason: HOSPADM

## 2022-01-01 RX ORDER — ONDANSETRON 2 MG/ML
INJECTION INTRAMUSCULAR; INTRAVENOUS CODE/TRAUMA/SEDATION MEDICATION
Status: DISCONTINUED | OUTPATIENT
Start: 2022-01-01 | End: 2022-01-01 | Stop reason: HOSPADM

## 2022-01-01 RX ORDER — MIRTAZAPINE 15 MG/1
7.5 TABLET, FILM COATED ORAL
Status: DISCONTINUED | OUTPATIENT
Start: 2022-01-01 | End: 2022-01-01 | Stop reason: HOSPADM

## 2022-01-01 RX ORDER — LACTULOSE 10 G/15ML
10 SOLUTION ORAL 2 TIMES DAILY PRN
Qty: 240 ML | Refills: 0 | Status: SHIPPED | OUTPATIENT
Start: 2022-01-01 | End: 2022-01-01 | Stop reason: ALTCHOICE

## 2022-01-01 RX ORDER — ATROPINE SULFATE 1 MG/ML
0.25 INJECTION, SOLUTION INTRAMUSCULAR; INTRAVENOUS; SUBCUTANEOUS ONCE AS NEEDED
Status: CANCELLED | OUTPATIENT
Start: 2022-01-01

## 2022-01-01 RX ORDER — CEFAZOLIN SODIUM 2 G/50ML
2000 SOLUTION INTRAVENOUS ONCE
Status: COMPLETED | OUTPATIENT
Start: 2022-01-01 | End: 2022-01-01

## 2022-01-01 RX ORDER — SODIUM CHLORIDE, SODIUM GLUCONATE, SODIUM ACETATE, POTASSIUM CHLORIDE, MAGNESIUM CHLORIDE, SODIUM PHOSPHATE, DIBASIC, AND POTASSIUM PHOSPHATE .53; .5; .37; .037; .03; .012; .00082 G/100ML; G/100ML; G/100ML; G/100ML; G/100ML; G/100ML; G/100ML
100 INJECTION, SOLUTION INTRAVENOUS CONTINUOUS
Status: DISCONTINUED | OUTPATIENT
Start: 2022-01-01 | End: 2022-01-01

## 2022-01-01 RX ORDER — ONDANSETRON 4 MG/1
TABLET, FILM COATED ORAL
Qty: 90 TABLET | Refills: 0 | Status: SHIPPED | OUTPATIENT
Start: 2022-01-01

## 2022-01-01 RX ORDER — FUROSEMIDE 10 MG/ML
20 INJECTION INTRAMUSCULAR; INTRAVENOUS ONCE AS NEEDED
Status: COMPLETED | OUTPATIENT
Start: 2022-01-01 | End: 2022-01-01

## 2022-01-01 RX ORDER — PROCHLORPERAZINE MALEATE 10 MG
10 TABLET ORAL EVERY 6 HOURS PRN
Qty: 30 TABLET | Refills: 0 | Status: SHIPPED | OUTPATIENT
Start: 2022-01-01 | End: 2022-01-01 | Stop reason: SDUPTHER

## 2022-01-01 RX ORDER — PROCHLORPERAZINE MALEATE 10 MG
10 TABLET ORAL EVERY 6 HOURS PRN
Qty: 30 TABLET | Refills: 0 | Status: SHIPPED | OUTPATIENT
Start: 2022-01-01

## 2022-01-01 RX ORDER — HYDROMORPHONE HCL IN WATER/PF 6 MG/30 ML
0.2 PATIENT CONTROLLED ANALGESIA SYRINGE INTRAVENOUS
Status: DISCONTINUED | OUTPATIENT
Start: 2022-01-01 | End: 2022-01-01 | Stop reason: HOSPADM

## 2022-01-01 RX ORDER — OXYCODONE HYDROCHLORIDE 5 MG/1
TABLET ORAL
Qty: 120 TABLET | Refills: 0 | Status: SHIPPED | OUTPATIENT
Start: 2022-01-01 | End: 2022-01-01

## 2022-01-01 RX ORDER — MORPHINE SULFATE 10 MG/ML
6 INJECTION, SOLUTION INTRAMUSCULAR; INTRAVENOUS ONCE
Status: COMPLETED | OUTPATIENT
Start: 2022-01-01 | End: 2022-01-01

## 2022-01-01 RX ORDER — LIDOCAINE AND PRILOCAINE 25; 25 MG/G; MG/G
CREAM TOPICAL
Qty: 30 G | Refills: 2 | Status: SHIPPED | OUTPATIENT
Start: 2022-01-01 | End: 2022-01-01 | Stop reason: ALTCHOICE

## 2022-01-01 RX ORDER — OXYCODONE HYDROCHLORIDE 5 MG/1
5 TABLET ORAL EVERY 4 HOURS PRN
Status: DISCONTINUED | OUTPATIENT
Start: 2022-01-01 | End: 2022-01-01 | Stop reason: HOSPADM

## 2022-01-01 RX ORDER — ACETAMINOPHEN 325 MG/1
650 TABLET ORAL EVERY 6 HOURS PRN
Status: DISCONTINUED | OUTPATIENT
Start: 2022-01-01 | End: 2022-01-01 | Stop reason: HOSPADM

## 2022-01-01 RX ORDER — SODIUM CHLORIDE 9 MG/ML
250 INJECTION, SOLUTION INTRAVENOUS ONCE
Status: CANCELLED
Start: 2022-01-01 | End: 2022-01-01

## 2022-01-01 RX ORDER — METOCLOPRAMIDE HYDROCHLORIDE 5 MG/ML
10 INJECTION INTRAMUSCULAR; INTRAVENOUS EVERY 6 HOURS PRN
Status: DISCONTINUED | OUTPATIENT
Start: 2022-01-01 | End: 2022-01-01

## 2022-01-01 RX ORDER — ACETAMINOPHEN 325 MG/1
650 TABLET ORAL ONCE
Status: CANCELLED | OUTPATIENT
Start: 2022-01-01

## 2022-01-01 RX ORDER — ATROPINE SULFATE 1 MG/ML
0.25 INJECTION, SOLUTION INTRAMUSCULAR; INTRAVENOUS; SUBCUTANEOUS ONCE
Status: CANCELLED | OUTPATIENT
Start: 2022-01-01

## 2022-01-01 RX ORDER — ONDANSETRON 4 MG/1
4 TABLET, FILM COATED ORAL EVERY 8 HOURS PRN
Qty: 90 TABLET | Refills: 0 | Status: SHIPPED | OUTPATIENT
Start: 2022-01-01 | End: 2022-01-01

## 2022-01-01 RX ORDER — OXYCODONE HYDROCHLORIDE 5 MG/1
7.5 TABLET ORAL EVERY 4 HOURS PRN
Qty: 120 TABLET | Refills: 0 | Status: SHIPPED | OUTPATIENT
Start: 2022-01-01 | End: 2022-01-01 | Stop reason: SDUPTHER

## 2022-01-01 RX ORDER — ATORVASTATIN CALCIUM 20 MG/1
40 TABLET, FILM COATED ORAL
Status: DISCONTINUED | OUTPATIENT
Start: 2022-01-01 | End: 2022-01-01

## 2022-01-01 RX ORDER — DEXTROSE MONOHYDRATE 50 MG/ML
20 INJECTION, SOLUTION INTRAVENOUS ONCE
Status: DISCONTINUED | OUTPATIENT
Start: 2022-01-01 | End: 2022-01-01 | Stop reason: HOSPADM

## 2022-01-01 RX ORDER — SODIUM CHLORIDE 9 MG/ML
250 INJECTION, SOLUTION INTRAVENOUS ONCE
Status: COMPLETED | OUTPATIENT
Start: 2022-01-01 | End: 2022-01-01

## 2022-01-01 RX ORDER — DOCUSATE SODIUM 100 MG/1
100 CAPSULE, LIQUID FILLED ORAL 2 TIMES DAILY PRN
Qty: 60 CAPSULE | Refills: 1 | Status: SHIPPED | OUTPATIENT
Start: 2022-01-01 | End: 2022-01-01 | Stop reason: ALTCHOICE

## 2022-01-01 RX ORDER — ONDANSETRON 4 MG/1
4 TABLET, FILM COATED ORAL EVERY 8 HOURS PRN
Qty: 20 TABLET | Refills: 0 | Status: SHIPPED | OUTPATIENT
Start: 2022-01-01 | End: 2022-01-01 | Stop reason: SDUPTHER

## 2022-01-01 RX ORDER — METOCLOPRAMIDE 5 MG/1
5 TABLET ORAL 4 TIMES DAILY
Qty: 120 TABLET | Refills: 2 | Status: SHIPPED | OUTPATIENT
Start: 2022-01-01 | End: 2022-01-01

## 2022-01-01 RX ORDER — BISACODYL 10 MG
10 SUPPOSITORY, RECTAL RECTAL DAILY
Qty: 12 SUPPOSITORY | Refills: 0 | Status: SHIPPED | OUTPATIENT
Start: 2022-01-01 | End: 2022-01-01 | Stop reason: SDUPTHER

## 2022-01-01 RX ORDER — SALIVA SUBSTITUTE COMB NO.11 351 MG
1 POWDER IN PACKET (EA) MUCOUS MEMBRANE EVERY 4 HOURS PRN
Qty: 30 EACH | Refills: 3 | Status: SHIPPED | OUTPATIENT
Start: 2022-01-01 | End: 2022-01-01 | Stop reason: ALTCHOICE

## 2022-01-01 RX ORDER — METOCLOPRAMIDE HYDROCHLORIDE 5 MG/ML
10 INJECTION INTRAMUSCULAR; INTRAVENOUS EVERY 6 HOURS PRN
Status: DISCONTINUED | OUTPATIENT
Start: 2022-01-01 | End: 2022-01-01 | Stop reason: HOSPADM

## 2022-01-01 RX ORDER — DOCUSATE SODIUM 100 MG/1
100 CAPSULE, LIQUID FILLED ORAL 2 TIMES DAILY PRN
Status: DISCONTINUED | OUTPATIENT
Start: 2022-01-01 | End: 2022-01-01

## 2022-01-01 RX ORDER — HYDROMORPHONE HCL/PF 1 MG/ML
0.5 SYRINGE (ML) INJECTION ONCE
Status: COMPLETED | OUTPATIENT
Start: 2022-01-01 | End: 2022-01-01

## 2022-01-01 RX ORDER — DEXAMETHASONE 1 MG
2 TABLET ORAL
Qty: 120 TABLET | Refills: 0 | Status: SHIPPED | OUTPATIENT
Start: 2022-01-01

## 2022-01-01 RX ORDER — SODIUM CHLORIDE, SODIUM GLUCONATE, SODIUM ACETATE, POTASSIUM CHLORIDE, MAGNESIUM CHLORIDE, SODIUM PHOSPHATE, DIBASIC, AND POTASSIUM PHOSPHATE .53; .5; .37; .037; .03; .012; .00082 G/100ML; G/100ML; G/100ML; G/100ML; G/100ML; G/100ML; G/100ML
75 INJECTION, SOLUTION INTRAVENOUS CONTINUOUS
Status: DISCONTINUED | OUTPATIENT
Start: 2022-01-01 | End: 2022-01-01

## 2022-01-01 RX ORDER — FENTANYL CITRATE 50 UG/ML
INJECTION, SOLUTION INTRAMUSCULAR; INTRAVENOUS CODE/TRAUMA/SEDATION MEDICATION
Status: DISCONTINUED | OUTPATIENT
Start: 2022-01-01 | End: 2022-01-01 | Stop reason: HOSPADM

## 2022-01-01 RX ORDER — LIDOCAINE AND PRILOCAINE 25; 25 MG/G; MG/G
CREAM TOPICAL AS NEEDED
Qty: 30 G | Refills: 2 | Status: SHIPPED | OUTPATIENT
Start: 2022-01-01 | End: 2022-01-01

## 2022-01-01 RX ORDER — BISACODYL 10 MG
10 SUPPOSITORY, RECTAL RECTAL DAILY PRN
Qty: 12 SUPPOSITORY | Refills: 0 | Status: SHIPPED | OUTPATIENT
Start: 2022-01-01 | End: 2022-01-01 | Stop reason: SDUPTHER

## 2022-01-01 RX ORDER — CARVEDILOL 12.5 MG/1
12.5 TABLET ORAL 2 TIMES DAILY WITH MEALS
Status: DISCONTINUED | OUTPATIENT
Start: 2022-01-01 | End: 2022-01-01 | Stop reason: HOSPADM

## 2022-01-01 RX ORDER — LIDOCAINE HYDROCHLORIDE 10 MG/ML
INJECTION, SOLUTION EPIDURAL; INFILTRATION; INTRACAUDAL; PERINEURAL AS NEEDED
Status: DISCONTINUED | OUTPATIENT
Start: 2022-01-01 | End: 2022-01-01

## 2022-01-01 RX ORDER — ACETAMINOPHEN 325 MG/1
650 TABLET ORAL ONCE
Status: DISCONTINUED | OUTPATIENT
Start: 2022-01-01 | End: 2022-01-01 | Stop reason: HOSPADM

## 2022-01-01 RX ORDER — LACTULOSE 10 G/15ML
20 SOLUTION ORAL 3 TIMES DAILY
Qty: 473 ML | Refills: 0 | Status: SHIPPED | OUTPATIENT
Start: 2022-01-01 | End: 2022-01-01 | Stop reason: ALTCHOICE

## 2022-01-01 RX ORDER — BISACODYL 10 MG
10 SUPPOSITORY, RECTAL RECTAL DAILY PRN
Qty: 12 SUPPOSITORY | Refills: 0 | Status: SHIPPED | OUTPATIENT
Start: 2022-01-01

## 2022-01-01 RX ORDER — ONDANSETRON 4 MG/1
4 TABLET, FILM COATED ORAL EVERY 8 HOURS PRN
Qty: 20 TABLET | Refills: 0 | Status: SHIPPED | OUTPATIENT
Start: 2022-01-01 | End: 2022-01-01

## 2022-01-01 RX ORDER — TEMAZEPAM 7.5 MG/1
7.5 CAPSULE ORAL
Status: DISCONTINUED | OUTPATIENT
Start: 2022-01-01 | End: 2022-01-01 | Stop reason: HOSPADM

## 2022-01-01 RX ORDER — ONDANSETRON 4 MG/1
4 TABLET, FILM COATED ORAL EVERY 8 HOURS PRN
Qty: 90 TABLET | Refills: 0 | Status: SHIPPED | OUTPATIENT
Start: 2022-01-01 | End: 2022-01-01 | Stop reason: SDUPTHER

## 2022-01-01 RX ORDER — LORATADINE 10 MG/1
10 TABLET ORAL DAILY
Status: DISCONTINUED | OUTPATIENT
Start: 2022-01-01 | End: 2022-01-01 | Stop reason: HOSPADM

## 2022-01-01 RX ORDER — METOCLOPRAMIDE 5 MG/1
5 TABLET ORAL 4 TIMES DAILY
Qty: 120 TABLET | Refills: 2 | Status: SHIPPED | OUTPATIENT
Start: 2022-01-01 | End: 2022-01-01 | Stop reason: SDUPTHER

## 2022-01-01 RX ORDER — FLUTICASONE PROPIONATE 50 MCG
1 SPRAY, SUSPENSION (ML) NASAL DAILY
Status: DISCONTINUED | OUTPATIENT
Start: 2022-01-01 | End: 2022-01-01 | Stop reason: HOSPADM

## 2022-01-01 RX ORDER — MAGNESIUM HYDROXIDE/ALUMINUM HYDROXICE/SIMETHICONE 120; 1200; 1200 MG/30ML; MG/30ML; MG/30ML
30 SUSPENSION ORAL EVERY 6 HOURS PRN
Status: DISCONTINUED | OUTPATIENT
Start: 2022-01-01 | End: 2022-01-01 | Stop reason: HOSPADM

## 2022-01-01 RX ORDER — DEXAMETHASONE SODIUM PHOSPHATE 10 MG/ML
INJECTION, SOLUTION INTRAMUSCULAR; INTRAVENOUS AS NEEDED
Status: DISCONTINUED | OUTPATIENT
Start: 2022-01-01 | End: 2022-01-01

## 2022-01-01 RX ORDER — MIRTAZAPINE 7.5 MG/1
7.5 TABLET, FILM COATED ORAL
Qty: 30 TABLET | Refills: 0 | Status: SHIPPED | OUTPATIENT
Start: 2022-01-01

## 2022-01-01 RX ORDER — CARVEDILOL 12.5 MG/1
12.5 TABLET ORAL 2 TIMES DAILY WITH MEALS
Qty: 30 TABLET | Refills: 0 | Status: SHIPPED | OUTPATIENT
Start: 2022-01-01 | End: 2022-01-01 | Stop reason: ALTCHOICE

## 2022-01-01 RX ORDER — POTASSIUM CHLORIDE 14.9 MG/ML
20 INJECTION INTRAVENOUS
Status: DISCONTINUED | OUTPATIENT
Start: 2022-01-01 | End: 2022-01-01

## 2022-01-01 RX ORDER — ONDANSETRON 4 MG/1
TABLET, FILM COATED ORAL
Qty: 90 TABLET | Refills: 0 | Status: CANCELLED | OUTPATIENT
Start: 2022-01-01

## 2022-01-01 RX ORDER — SENNOSIDES 8.6 MG
8.6 TABLET ORAL
Qty: 30 TABLET | Refills: 0 | Status: SHIPPED | OUTPATIENT
Start: 2022-01-01

## 2022-01-01 RX ORDER — AMOXICILLIN 250 MG
1 CAPSULE ORAL
Status: DISCONTINUED | OUTPATIENT
Start: 2022-01-01 | End: 2022-01-01

## 2022-01-01 RX ORDER — AMOXICILLIN 250 MG
2 CAPSULE ORAL
Status: DISCONTINUED | OUTPATIENT
Start: 2022-01-01 | End: 2022-01-01 | Stop reason: HOSPADM

## 2022-01-01 RX ORDER — SODIUM CHLORIDE, SODIUM LACTATE, POTASSIUM CHLORIDE, CALCIUM CHLORIDE 600; 310; 30; 20 MG/100ML; MG/100ML; MG/100ML; MG/100ML
INJECTION, SOLUTION INTRAVENOUS CONTINUOUS PRN
Status: DISCONTINUED | OUTPATIENT
Start: 2022-01-01 | End: 2022-01-01

## 2022-01-01 RX ORDER — SODIUM CHLORIDE 9 MG/ML
75 INJECTION, SOLUTION INTRAVENOUS CONTINUOUS
Status: DISCONTINUED | OUTPATIENT
Start: 2022-01-01 | End: 2022-01-01 | Stop reason: HOSPADM

## 2022-01-01 RX ADMIN — Medication 100 MG: at 15:29

## 2022-01-01 RX ADMIN — FENTANYL CITRATE 50 MCG: 50 INJECTION INTRAMUSCULAR; INTRAVENOUS at 15:29

## 2022-01-01 RX ADMIN — OXYCODONE HYDROCHLORIDE 5 MG: 5 TABLET ORAL at 21:52

## 2022-01-01 RX ADMIN — ENOXAPARIN SODIUM 40 MG: 40 INJECTION SUBCUTANEOUS at 15:46

## 2022-01-01 RX ADMIN — GEMCITABINE 2000 MG: 38 INJECTION, SOLUTION INTRAVENOUS at 14:20

## 2022-01-01 RX ADMIN — SODIUM CHLORIDE, SODIUM GLUCONATE, SODIUM ACETATE, POTASSIUM CHLORIDE, MAGNESIUM CHLORIDE, SODIUM PHOSPHATE, DIBASIC, AND POTASSIUM PHOSPHATE 100 ML/HR: .53; .5; .37; .037; .03; .012; .00082 INJECTION, SOLUTION INTRAVENOUS at 00:20

## 2022-01-01 RX ADMIN — ONDANSETRON 4 MG: 2 INJECTION INTRAMUSCULAR; INTRAVENOUS at 08:39

## 2022-01-01 RX ADMIN — GEMCITABINE 2000 MG: 38 INJECTION, SOLUTION INTRAVENOUS at 09:42

## 2022-01-01 RX ADMIN — FENTANYL CITRATE 25 MCG: 50 INJECTION INTRAMUSCULAR; INTRAVENOUS at 13:49

## 2022-01-01 RX ADMIN — ONDANSETRON 4 MG: 2 INJECTION INTRAMUSCULAR; INTRAVENOUS at 22:54

## 2022-01-01 RX ADMIN — PROPOFOL 120 MCG/KG/MIN: 10 INJECTION, EMULSION INTRAVENOUS at 14:00

## 2022-01-01 RX ADMIN — ENOXAPARIN SODIUM 40 MG: 40 INJECTION SUBCUTANEOUS at 08:37

## 2022-01-01 RX ADMIN — ENOXAPARIN SODIUM 40 MG: 40 INJECTION SUBCUTANEOUS at 09:09

## 2022-01-01 RX ADMIN — IOHEXOL 100 ML: 350 INJECTION, SOLUTION INTRAVENOUS at 13:00

## 2022-01-01 RX ADMIN — NOREPINEPHRINE BITARTRATE 4 MCG: 1 INJECTION, SOLUTION, CONCENTRATE INTRAVENOUS at 15:12

## 2022-01-01 RX ADMIN — SENNOSIDES AND DOCUSATE SODIUM 1 TABLET: 50; 8.6 TABLET ORAL at 10:40

## 2022-01-01 RX ADMIN — NOREPINEPHRINE BITARTRATE 8 MCG: 1 INJECTION, SOLUTION, CONCENTRATE INTRAVENOUS at 15:23

## 2022-01-01 RX ADMIN — ENOXAPARIN SODIUM 40 MG: 40 INJECTION SUBCUTANEOUS at 08:33

## 2022-01-01 RX ADMIN — METOCLOPRAMIDE HYDROCHLORIDE 10 MG: 5 INJECTION INTRAMUSCULAR; INTRAVENOUS at 12:15

## 2022-01-01 RX ADMIN — FENTANYL CITRATE 25 MCG: 50 INJECTION, SOLUTION INTRAMUSCULAR; INTRAVENOUS at 09:29

## 2022-01-01 RX ADMIN — OXYCODONE HYDROCHLORIDE 5 MG: 5 TABLET ORAL at 05:30

## 2022-01-01 RX ADMIN — LISINOPRIL 10 MG: 10 TABLET ORAL at 21:22

## 2022-01-01 RX ADMIN — PANTOPRAZOLE SODIUM 20 MG: 20 TABLET, DELAYED RELEASE ORAL at 05:25

## 2022-01-01 RX ADMIN — OXYCODONE HYDROCHLORIDE 5 MG: 5 TABLET ORAL at 21:31

## 2022-01-01 RX ADMIN — OXYCODONE HYDROCHLORIDE 5 MG: 5 TABLET ORAL at 01:22

## 2022-01-01 RX ADMIN — SENNOSIDES AND DOCUSATE SODIUM 2 TABLET: 50; 8.6 TABLET ORAL at 08:11

## 2022-01-01 RX ADMIN — DEXAMETHASONE SODIUM PHOSPHATE: 10 INJECTION, SOLUTION INTRAMUSCULAR; INTRAVENOUS at 08:38

## 2022-01-01 RX ADMIN — PROPOFOL 170 MG: 10 INJECTION, EMULSION INTRAVENOUS at 13:49

## 2022-01-01 RX ADMIN — CARVEDILOL 12.5 MG: 12.5 TABLET, FILM COATED ORAL at 16:45

## 2022-01-01 RX ADMIN — PANTOPRAZOLE SODIUM 20 MG: 20 TABLET, DELAYED RELEASE ORAL at 06:45

## 2022-01-01 RX ADMIN — FUROSEMIDE 20 MG: 10 INJECTION, SOLUTION INTRAMUSCULAR; INTRAVENOUS at 15:39

## 2022-01-01 RX ADMIN — ENOXAPARIN SODIUM 40 MG: 40 INJECTION SUBCUTANEOUS at 08:08

## 2022-01-01 RX ADMIN — SODIUM CHLORIDE, SODIUM GLUCONATE, SODIUM ACETATE, POTASSIUM CHLORIDE, MAGNESIUM CHLORIDE, SODIUM PHOSPHATE, DIBASIC, AND POTASSIUM PHOSPHATE 75 ML/HR: .53; .5; .37; .037; .03; .012; .00082 INJECTION, SOLUTION INTRAVENOUS at 15:40

## 2022-01-01 RX ADMIN — OXYCODONE HYDROCHLORIDE 5 MG: 5 TABLET ORAL at 23:55

## 2022-01-01 RX ADMIN — POTASSIUM CHLORIDE 20 MEQ: 14.9 INJECTION, SOLUTION INTRAVENOUS at 09:24

## 2022-01-01 RX ADMIN — ONDANSETRON 4 MG: 2 INJECTION INTRAMUSCULAR; INTRAVENOUS at 12:15

## 2022-01-01 RX ADMIN — PANTOPRAZOLE SODIUM 20 MG: 20 TABLET, DELAYED RELEASE ORAL at 05:01

## 2022-01-01 RX ADMIN — HYDROMORPHONE HYDROCHLORIDE 0.2 MG: 0.2 INJECTION, SOLUTION INTRAMUSCULAR; INTRAVENOUS; SUBCUTANEOUS at 03:28

## 2022-01-01 RX ADMIN — SODIUM CHLORIDE, SODIUM GLUCONATE, SODIUM ACETATE, POTASSIUM CHLORIDE, MAGNESIUM CHLORIDE, SODIUM PHOSPHATE, DIBASIC, AND POTASSIUM PHOSPHATE 75 ML/HR: .53; .5; .37; .037; .03; .012; .00082 INJECTION, SOLUTION INTRAVENOUS at 18:41

## 2022-01-01 RX ADMIN — FENTANYL CITRATE 25 MCG: 50 INJECTION, SOLUTION INTRAMUSCULAR; INTRAVENOUS at 09:22

## 2022-01-01 RX ADMIN — OXYCODONE HYDROCHLORIDE 5 MG: 5 TABLET ORAL at 13:59

## 2022-01-01 RX ADMIN — FLUTICASONE PROPIONATE 1 SPRAY: 50 SPRAY, METERED NASAL at 08:33

## 2022-01-01 RX ADMIN — ONDANSETRON 4 MG: 2 INJECTION INTRAMUSCULAR; INTRAVENOUS at 15:55

## 2022-01-01 RX ADMIN — FLUTICASONE PROPIONATE 1 SPRAY: 50 SPRAY, METERED NASAL at 08:15

## 2022-01-01 RX ADMIN — SODIUM CHLORIDE 20 ML/HR: 9 INJECTION, SOLUTION INTRAVENOUS at 08:38

## 2022-01-01 RX ADMIN — FLUTICASONE PROPIONATE 1 SPRAY: 50 SPRAY, METERED NASAL at 08:11

## 2022-01-01 RX ADMIN — ONDANSETRON 4 MG: 2 INJECTION INTRAMUSCULAR; INTRAVENOUS at 04:21

## 2022-01-01 RX ADMIN — DEXAMETHASONE SODIUM PHOSPHATE: 10 INJECTION, SOLUTION INTRAMUSCULAR; INTRAVENOUS at 10:32

## 2022-01-01 RX ADMIN — SODIUM CHLORIDE, SODIUM GLUCONATE, SODIUM ACETATE, POTASSIUM CHLORIDE, MAGNESIUM CHLORIDE, SODIUM PHOSPHATE, DIBASIC, AND POTASSIUM PHOSPHATE 75 ML/HR: .53; .5; .37; .037; .03; .012; .00082 INJECTION, SOLUTION INTRAVENOUS at 05:07

## 2022-01-01 RX ADMIN — MIDAZOLAM 0.5 MG: 1 INJECTION INTRAMUSCULAR; INTRAVENOUS at 09:02

## 2022-01-01 RX ADMIN — PROPOFOL 100 MG: 10 INJECTION, EMULSION INTRAVENOUS at 15:29

## 2022-01-01 RX ADMIN — MIDAZOLAM 0.5 MG: 1 INJECTION INTRAMUSCULAR; INTRAVENOUS at 09:22

## 2022-01-01 RX ADMIN — OXALIPLATIN 165.75 MG: 5 INJECTION, SOLUTION, CONCENTRATE INTRAVENOUS at 11:13

## 2022-01-01 RX ADMIN — ONDANSETRON 4 MG: 2 INJECTION INTRAMUSCULAR; INTRAVENOUS at 18:25

## 2022-01-01 RX ADMIN — PANTOPRAZOLE SODIUM 20 MG: 20 TABLET, DELAYED RELEASE ORAL at 05:27

## 2022-01-01 RX ADMIN — ENOXAPARIN SODIUM 40 MG: 40 INJECTION SUBCUTANEOUS at 10:41

## 2022-01-01 RX ADMIN — DEXAMETHASONE SODIUM PHOSPHATE: 10 INJECTION, SOLUTION INTRAMUSCULAR; INTRAVENOUS at 10:46

## 2022-01-01 RX ADMIN — ONDANSETRON 4 MG: 2 INJECTION INTRAMUSCULAR; INTRAVENOUS at 08:47

## 2022-01-01 RX ADMIN — SODIUM CHLORIDE, SODIUM GLUCONATE, SODIUM ACETATE, POTASSIUM CHLORIDE, MAGNESIUM CHLORIDE, SODIUM PHOSPHATE, DIBASIC, AND POTASSIUM PHOSPHATE 75 ML/HR: .53; .5; .37; .037; .03; .012; .00082 INJECTION, SOLUTION INTRAVENOUS at 20:42

## 2022-01-01 RX ADMIN — PROPOFOL 100 MG: 10 INJECTION, EMULSION INTRAVENOUS at 14:00

## 2022-01-01 RX ADMIN — LORATADINE 10 MG: 10 TABLET ORAL at 08:10

## 2022-01-01 RX ADMIN — SODIUM CHLORIDE 500 ML: 0.9 INJECTION, SOLUTION INTRAVENOUS at 12:07

## 2022-01-01 RX ADMIN — PANTOPRAZOLE SODIUM 20 MG: 20 TABLET, DELAYED RELEASE ORAL at 06:15

## 2022-01-01 RX ADMIN — PANTOPRAZOLE SODIUM 20 MG: 20 TABLET, DELAYED RELEASE ORAL at 05:00

## 2022-01-01 RX ADMIN — PROPOFOL 30 MG: 10 INJECTION, EMULSION INTRAVENOUS at 14:02

## 2022-01-01 RX ADMIN — ONDANSETRON 4 MG: 2 INJECTION INTRAMUSCULAR; INTRAVENOUS at 20:09

## 2022-01-01 RX ADMIN — DEXTROSE 20 ML/HR: 50 INJECTION, SOLUTION INTRAVENOUS at 11:01

## 2022-01-01 RX ADMIN — DEXTROSE 20 ML/HR: 50 INJECTION, SOLUTION INTRAVENOUS at 12:28

## 2022-01-01 RX ADMIN — SODIUM CHLORIDE 500 ML: 0.9 INJECTION, SOLUTION INTRAVENOUS at 11:00

## 2022-01-01 RX ADMIN — PANTOPRAZOLE SODIUM 20 MG: 20 TABLET, DELAYED RELEASE ORAL at 05:30

## 2022-01-01 RX ADMIN — PIPERACILLIN AND TAZOBACTAM 3.38 G: 36; 4.5 INJECTION, POWDER, FOR SOLUTION INTRAVENOUS at 08:09

## 2022-01-01 RX ADMIN — ONDANSETRON 4 MG: 2 INJECTION INTRAMUSCULAR; INTRAVENOUS at 15:29

## 2022-01-01 RX ADMIN — LUBIPROSTONE 24 MCG: 24 CAPSULE, GELATIN COATED ORAL at 16:45

## 2022-01-01 RX ADMIN — DEXAMETHASONE SODIUM PHOSPHATE: 10 INJECTION, SOLUTION INTRAMUSCULAR; INTRAVENOUS at 13:54

## 2022-01-01 RX ADMIN — FLUTICASONE PROPIONATE 1 SPRAY: 50 SPRAY, METERED NASAL at 14:24

## 2022-01-01 RX ADMIN — SODIUM CHLORIDE 20 ML/HR: 9 INJECTION, SOLUTION INTRAVENOUS at 10:32

## 2022-01-01 RX ADMIN — ONDANSETRON 4 MG: 2 INJECTION INTRAMUSCULAR; INTRAVENOUS at 08:10

## 2022-01-01 RX ADMIN — OXYCODONE HYDROCHLORIDE 5 MG: 5 TABLET ORAL at 14:21

## 2022-01-01 RX ADMIN — TEMAZEPAM 7.5 MG: 7.5 CAPSULE ORAL at 01:22

## 2022-01-01 RX ADMIN — SODIUM CHLORIDE, SODIUM GLUCONATE, SODIUM ACETATE, POTASSIUM CHLORIDE, MAGNESIUM CHLORIDE, SODIUM PHOSPHATE, DIBASIC, AND POTASSIUM PHOSPHATE 100 ML/HR: .53; .5; .37; .037; .03; .012; .00082 INJECTION, SOLUTION INTRAVENOUS at 12:14

## 2022-01-01 RX ADMIN — FENTANYL CITRATE 25 MCG: 50 INJECTION, SOLUTION INTRAMUSCULAR; INTRAVENOUS at 09:35

## 2022-01-01 RX ADMIN — ACETAMINOPHEN 650 MG: 325 TABLET, FILM COATED ORAL at 09:52

## 2022-01-01 RX ADMIN — PANTOPRAZOLE SODIUM 20 MG: 20 TABLET, DELAYED RELEASE ORAL at 05:39

## 2022-01-01 RX ADMIN — LORATADINE 10 MG: 10 TABLET ORAL at 08:33

## 2022-01-01 RX ADMIN — SODIUM CHLORIDE, SODIUM LACTATE, POTASSIUM CHLORIDE, AND CALCIUM CHLORIDE 1000 ML: .6; .31; .03; .02 INJECTION, SOLUTION INTRAVENOUS at 22:48

## 2022-01-01 RX ADMIN — OXYCODONE HYDROCHLORIDE 5 MG: 5 TABLET ORAL at 20:45

## 2022-01-01 RX ADMIN — POTASSIUM CHLORIDE 40 MEQ: 1500 TABLET, EXTENDED RELEASE ORAL at 09:47

## 2022-01-01 RX ADMIN — PANTOPRAZOLE SODIUM 20 MG: 20 TABLET, DELAYED RELEASE ORAL at 06:28

## 2022-01-01 RX ADMIN — INDOMETHACIN 100 MG: 50 SUPPOSITORY RECTAL at 15:36

## 2022-01-01 RX ADMIN — HYDROMORPHONE HYDROCHLORIDE 0.5 MG: 1 INJECTION, SOLUTION INTRAMUSCULAR; INTRAVENOUS; SUBCUTANEOUS at 02:24

## 2022-01-01 RX ADMIN — ONDANSETRON 4 MG: 2 INJECTION INTRAMUSCULAR; INTRAVENOUS at 18:26

## 2022-01-01 RX ADMIN — SODIUM CHLORIDE 20 ML/HR: 9 INJECTION, SOLUTION INTRAVENOUS at 12:34

## 2022-01-01 RX ADMIN — ONDANSETRON 4 MG: 2 INJECTION INTRAMUSCULAR; INTRAVENOUS at 12:32

## 2022-01-01 RX ADMIN — HYDROMORPHONE HYDROCHLORIDE 0.2 MG: 0.2 INJECTION, SOLUTION INTRAMUSCULAR; INTRAVENOUS; SUBCUTANEOUS at 21:11

## 2022-01-01 RX ADMIN — OXYCODONE HYDROCHLORIDE 5 MG: 5 TABLET ORAL at 22:30

## 2022-01-01 RX ADMIN — OXYCODONE HYDROCHLORIDE 5 MG: 5 TABLET ORAL at 20:09

## 2022-01-01 RX ADMIN — DIPHENHYDRAMINE HYDROCHLORIDE 25 MG: 50 INJECTION, SOLUTION INTRAMUSCULAR; INTRAVENOUS at 12:42

## 2022-01-01 RX ADMIN — LORATADINE 10 MG: 10 TABLET ORAL at 08:12

## 2022-01-01 RX ADMIN — PIPERACILLIN AND TAZOBACTAM 3.38 G: 36; 4.5 INJECTION, POWDER, FOR SOLUTION INTRAVENOUS at 03:11

## 2022-01-01 RX ADMIN — ONDANSETRON 4 MG: 2 INJECTION INTRAMUSCULAR; INTRAVENOUS at 22:24

## 2022-01-01 RX ADMIN — ENOXAPARIN SODIUM 40 MG: 40 INJECTION SUBCUTANEOUS at 08:11

## 2022-01-01 RX ADMIN — MIRTAZAPINE 7.5 MG: 15 TABLET, FILM COATED ORAL at 23:13

## 2022-01-01 RX ADMIN — LUBIPROSTONE 24 MCG: 24 CAPSULE, GELATIN COATED ORAL at 08:10

## 2022-01-01 RX ADMIN — SODIUM CHLORIDE 20 ML/HR: 0.9 INJECTION, SOLUTION INTRAVENOUS at 12:42

## 2022-01-01 RX ADMIN — SODIUM CHLORIDE 20 ML/HR: 9 INJECTION, SOLUTION INTRAVENOUS at 10:46

## 2022-01-01 RX ADMIN — MIDAZOLAM 0.5 MG: 1 INJECTION INTRAMUSCULAR; INTRAVENOUS at 08:57

## 2022-01-01 RX ADMIN — POTASSIUM CHLORIDE 40 MEQ: 1500 TABLET, EXTENDED RELEASE ORAL at 10:40

## 2022-01-01 RX ADMIN — SODIUM CHLORIDE 20 ML/HR: 9 INJECTION, SOLUTION INTRAVENOUS at 09:06

## 2022-01-01 RX ADMIN — LORATADINE 10 MG: 10 TABLET ORAL at 08:08

## 2022-01-01 RX ADMIN — MIRTAZAPINE 7.5 MG: 15 TABLET, FILM COATED ORAL at 21:00

## 2022-01-01 RX ADMIN — SODIUM CHLORIDE 20 ML/HR: 9 INJECTION, SOLUTION INTRAVENOUS at 11:26

## 2022-01-01 RX ADMIN — ACETAMINOPHEN 650 MG: 325 TABLET, FILM COATED ORAL at 12:35

## 2022-01-01 RX ADMIN — GEMCITABINE 2000 MG: 38 INJECTION, SOLUTION INTRAVENOUS at 12:19

## 2022-01-01 RX ADMIN — OXALIPLATIN 169.15 MG: 5 INJECTION, SOLUTION INTRAVENOUS at 10:24

## 2022-01-01 RX ADMIN — MIRTAZAPINE 7.5 MG: 15 TABLET, FILM COATED ORAL at 21:49

## 2022-01-01 RX ADMIN — SODIUM CHLORIDE 250 ML/HR: 0.9 INJECTION, SOLUTION INTRAVENOUS at 12:56

## 2022-01-01 RX ADMIN — SODIUM CHLORIDE, SODIUM GLUCONATE, SODIUM ACETATE, POTASSIUM CHLORIDE, MAGNESIUM CHLORIDE, SODIUM PHOSPHATE, DIBASIC, AND POTASSIUM PHOSPHATE 75 ML/HR: .53; .5; .37; .037; .03; .012; .00082 INJECTION, SOLUTION INTRAVENOUS at 23:46

## 2022-01-01 RX ADMIN — LIDOCAINE HYDROCHLORIDE 50 MG: 10 INJECTION, SOLUTION EPIDURAL; INFILTRATION; INTRACAUDAL; PERINEURAL at 15:29

## 2022-01-01 RX ADMIN — SODIUM CHLORIDE 20 ML/HR: 9 INJECTION, SOLUTION INTRAVENOUS at 10:55

## 2022-01-01 RX ADMIN — HYDROMORPHONE HYDROCHLORIDE 0.2 MG: 0.2 INJECTION, SOLUTION INTRAMUSCULAR; INTRAVENOUS; SUBCUTANEOUS at 05:27

## 2022-01-01 RX ADMIN — ONDANSETRON 4 MG: 2 INJECTION INTRAMUSCULAR; INTRAVENOUS at 21:00

## 2022-01-01 RX ADMIN — OXYCODONE HYDROCHLORIDE 5 MG: 5 TABLET ORAL at 09:39

## 2022-01-01 RX ADMIN — LORATADINE 10 MG: 10 TABLET ORAL at 08:37

## 2022-01-01 RX ADMIN — SODIUM CHLORIDE 20 ML/HR: 0.9 INJECTION, SOLUTION INTRAVENOUS at 13:51

## 2022-01-01 RX ADMIN — HYDROMORPHONE HYDROCHLORIDE 0.5 MG: 1 INJECTION, SOLUTION INTRAMUSCULAR; INTRAVENOUS; SUBCUTANEOUS at 22:24

## 2022-01-01 RX ADMIN — LIDOCAINE HYDROCHLORIDE 90 MG: 10 INJECTION, SOLUTION EPIDURAL; INFILTRATION; INTRACAUDAL; PERINEURAL at 13:49

## 2022-01-01 RX ADMIN — HYDROMORPHONE HYDROCHLORIDE 0.2 MG: 0.2 INJECTION, SOLUTION INTRAMUSCULAR; INTRAVENOUS; SUBCUTANEOUS at 17:26

## 2022-01-01 RX ADMIN — CARVEDILOL 12.5 MG: 12.5 TABLET, FILM COATED ORAL at 08:26

## 2022-01-01 RX ADMIN — Medication 80 MG: at 13:50

## 2022-01-01 RX ADMIN — PEGFILGRASTIM 6 MG: KIT SUBCUTANEOUS at 09:58

## 2022-01-01 RX ADMIN — OXYCODONE HYDROCHLORIDE 5 MG: 5 TABLET ORAL at 20:55

## 2022-01-01 RX ADMIN — ENOXAPARIN SODIUM 40 MG: 40 INJECTION SUBCUTANEOUS at 08:12

## 2022-01-01 RX ADMIN — SODIUM CHLORIDE, SODIUM LACTATE, POTASSIUM CHLORIDE, AND CALCIUM CHLORIDE: .6; .31; .03; .02 INJECTION, SOLUTION INTRAVENOUS at 13:45

## 2022-01-01 RX ADMIN — DIPHENHYDRAMINE HYDROCHLORIDE 25 MG: 50 INJECTION, SOLUTION INTRAMUSCULAR; INTRAVENOUS at 09:49

## 2022-01-01 RX ADMIN — SODIUM CHLORIDE, SODIUM GLUCONATE, SODIUM ACETATE, POTASSIUM CHLORIDE, MAGNESIUM CHLORIDE, SODIUM PHOSPHATE, DIBASIC, AND POTASSIUM PHOSPHATE 75 ML/HR: .53; .5; .37; .037; .03; .012; .00082 INJECTION, SOLUTION INTRAVENOUS at 13:21

## 2022-01-01 RX ADMIN — ONDANSETRON 4 MG: 2 INJECTION INTRAMUSCULAR; INTRAVENOUS at 14:42

## 2022-01-01 RX ADMIN — FLUTICASONE PROPIONATE 1 SPRAY: 50 SPRAY, METERED NASAL at 09:14

## 2022-01-01 RX ADMIN — LORATADINE 10 MG: 10 TABLET ORAL at 10:40

## 2022-01-01 RX ADMIN — LISINOPRIL 10 MG: 10 TABLET ORAL at 21:10

## 2022-01-01 RX ADMIN — PEGFILGRASTIM 6 MG: KIT SUBCUTANEOUS at 12:50

## 2022-01-01 RX ADMIN — SODIUM CHLORIDE 20 ML/HR: 9 INJECTION, SOLUTION INTRAVENOUS at 11:31

## 2022-01-01 RX ADMIN — MIRTAZAPINE 7.5 MG: 15 TABLET, FILM COATED ORAL at 21:22

## 2022-01-01 RX ADMIN — ACETAMINOPHEN 325MG 650 MG: 325 TABLET ORAL at 09:24

## 2022-01-01 RX ADMIN — GEMCITABINE 2000 MG: 38 INJECTION, SOLUTION INTRAVENOUS at 11:21

## 2022-01-01 RX ADMIN — OXALIPLATIN 164.9 MG: 5 INJECTION, SOLUTION INTRAVENOUS at 11:57

## 2022-01-01 RX ADMIN — GEMCITABINE 2000 MG: 38 INJECTION, SOLUTION INTRAVENOUS at 11:53

## 2022-01-01 RX ADMIN — FENTANYL CITRATE 25 MCG: 50 INJECTION INTRAMUSCULAR; INTRAVENOUS at 15:16

## 2022-01-01 RX ADMIN — MIDAZOLAM 0.5 MG: 1 INJECTION INTRAMUSCULAR; INTRAVENOUS at 09:12

## 2022-01-01 RX ADMIN — ONDANSETRON 4 MG: 2 INJECTION INTRAMUSCULAR; INTRAVENOUS at 01:22

## 2022-01-01 RX ADMIN — DEXAMETHASONE SODIUM PHOSPHATE 10 MG: 10 INJECTION, SOLUTION INTRAMUSCULAR; INTRAVENOUS at 15:30

## 2022-01-01 RX ADMIN — SODIUM CHLORIDE, SODIUM GLUCONATE, SODIUM ACETATE, POTASSIUM CHLORIDE, MAGNESIUM CHLORIDE, SODIUM PHOSPHATE, DIBASIC, AND POTASSIUM PHOSPHATE 75 ML/HR: .53; .5; .37; .037; .03; .012; .00082 INJECTION, SOLUTION INTRAVENOUS at 05:21

## 2022-01-01 RX ADMIN — CARVEDILOL 12.5 MG: 12.5 TABLET, FILM COATED ORAL at 17:26

## 2022-01-01 RX ADMIN — HYDROMORPHONE HYDROCHLORIDE 0.2 MG: 0.2 INJECTION, SOLUTION INTRAMUSCULAR; INTRAVENOUS; SUBCUTANEOUS at 08:10

## 2022-01-01 RX ADMIN — LIDOCAINE HYDROCHLORIDE 100 MG: 10 INJECTION, SOLUTION EPIDURAL; INFILTRATION; INTRACAUDAL; PERINEURAL at 14:00

## 2022-01-01 RX ADMIN — ONDANSETRON 4 MG: 2 INJECTION INTRAMUSCULAR; INTRAVENOUS at 14:22

## 2022-01-01 RX ADMIN — ONDANSETRON 4 MG: 2 INJECTION INTRAMUSCULAR; INTRAVENOUS at 01:00

## 2022-01-01 RX ADMIN — FENTANYL CITRATE 25 MCG: 50 INJECTION INTRAMUSCULAR; INTRAVENOUS at 14:02

## 2022-01-01 RX ADMIN — GLUCAGON HYDROCHLORIDE 0.5 MCG: KIT at 15:14

## 2022-01-01 RX ADMIN — SODIUM CHLORIDE, SODIUM GLUCONATE, SODIUM ACETATE, POTASSIUM CHLORIDE, MAGNESIUM CHLORIDE, SODIUM PHOSPHATE, DIBASIC, AND POTASSIUM PHOSPHATE 100 ML/HR: .53; .5; .37; .037; .03; .012; .00082 INJECTION, SOLUTION INTRAVENOUS at 03:21

## 2022-01-01 RX ADMIN — DEXAMETHASONE SODIUM PHOSPHATE: 10 INJECTION, SOLUTION INTRAMUSCULAR; INTRAVENOUS at 10:53

## 2022-01-01 RX ADMIN — ONDANSETRON 4 MG: 2 INJECTION INTRAMUSCULAR; INTRAVENOUS at 17:48

## 2022-01-01 RX ADMIN — MIRTAZAPINE 7.5 MG: 15 TABLET, FILM COATED ORAL at 22:10

## 2022-01-01 RX ADMIN — HYDROMORPHONE HYDROCHLORIDE 0.5 MG: 1 INJECTION, SOLUTION INTRAMUSCULAR; INTRAVENOUS; SUBCUTANEOUS at 12:43

## 2022-01-01 RX ADMIN — MIRTAZAPINE 7.5 MG: 15 TABLET, FILM COATED ORAL at 22:29

## 2022-01-01 RX ADMIN — FLUTICASONE PROPIONATE 1 SPRAY: 50 SPRAY, METERED NASAL at 08:08

## 2022-01-01 RX ADMIN — ENOXAPARIN SODIUM 40 MG: 40 INJECTION SUBCUTANEOUS at 08:52

## 2022-01-01 RX ADMIN — IOHEXOL 9 ML: 240 INJECTION, SOLUTION INTRATHECAL; INTRAVASCULAR; INTRAVENOUS; ORAL at 15:15

## 2022-01-01 RX ADMIN — SENNOSIDES AND DOCUSATE SODIUM 1 TABLET: 50; 8.6 TABLET ORAL at 08:08

## 2022-01-01 RX ADMIN — PANTOPRAZOLE SODIUM 20 MG: 20 TABLET, DELAYED RELEASE ORAL at 05:21

## 2022-01-01 RX ADMIN — SENNOSIDES AND DOCUSATE SODIUM 2 TABLET: 50; 8.6 TABLET ORAL at 08:33

## 2022-01-01 RX ADMIN — LUBIPROSTONE 24 MCG: 24 CAPSULE, GELATIN COATED ORAL at 17:26

## 2022-01-01 RX ADMIN — ONDANSETRON 4 MG: 2 INJECTION INTRAMUSCULAR; INTRAVENOUS at 06:46

## 2022-01-01 RX ADMIN — DIPHENHYDRAMINE HYDROCHLORIDE 25 MG: 50 INJECTION, SOLUTION INTRAMUSCULAR; INTRAVENOUS at 11:26

## 2022-01-01 RX ADMIN — SIMETHICONE 80 MG: 20 EMULSION ORAL at 11:47

## 2022-01-01 RX ADMIN — SODIUM CHLORIDE, SODIUM GLUCONATE, SODIUM ACETATE, POTASSIUM CHLORIDE, MAGNESIUM CHLORIDE, SODIUM PHOSPHATE, DIBASIC, AND POTASSIUM PHOSPHATE 75 ML/HR: .53; .5; .37; .037; .03; .012; .00082 INJECTION, SOLUTION INTRAVENOUS at 02:29

## 2022-01-01 RX ADMIN — OXALIPLATIN 162.35 MG: 5 INJECTION, SOLUTION INTRAVENOUS at 12:30

## 2022-01-01 RX ADMIN — ACETAMINOPHEN 650 MG: 325 TABLET, FILM COATED ORAL at 09:12

## 2022-01-01 RX ADMIN — METOCLOPRAMIDE HYDROCHLORIDE 10 MG: 5 INJECTION INTRAMUSCULAR; INTRAVENOUS at 16:13

## 2022-01-01 RX ADMIN — METOCLOPRAMIDE HYDROCHLORIDE 10 MG: 5 INJECTION INTRAMUSCULAR; INTRAVENOUS at 02:10

## 2022-01-01 RX ADMIN — DEXTROSE 20 ML/HR: 50 INJECTION, SOLUTION INTRAVENOUS at 10:21

## 2022-01-01 RX ADMIN — METHYLNALTREXONE BROMIDE 8 MG: 8 INJECTION, SOLUTION SUBCUTANEOUS at 14:42

## 2022-01-01 RX ADMIN — CARVEDILOL 12.5 MG: 12.5 TABLET, FILM COATED ORAL at 08:10

## 2022-01-01 RX ADMIN — SENNOSIDES AND DOCUSATE SODIUM 2 TABLET: 50; 8.6 TABLET ORAL at 10:15

## 2022-01-01 RX ADMIN — OXYCODONE HYDROCHLORIDE 5 MG: 5 TABLET ORAL at 21:00

## 2022-01-01 RX ADMIN — OXYCODONE HYDROCHLORIDE 5 MG: 5 TABLET ORAL at 21:22

## 2022-01-01 RX ADMIN — METOCLOPRAMIDE HYDROCHLORIDE 10 MG: 5 INJECTION INTRAMUSCULAR; INTRAVENOUS at 23:24

## 2022-01-01 RX ADMIN — CARVEDILOL 12.5 MG: 12.5 TABLET, FILM COATED ORAL at 08:37

## 2022-01-01 RX ADMIN — LORATADINE 10 MG: 10 TABLET ORAL at 10:11

## 2022-01-01 RX ADMIN — FLUTICASONE PROPIONATE 1 SPRAY: 50 SPRAY, METERED NASAL at 08:53

## 2022-01-01 RX ADMIN — OXYCODONE HYDROCHLORIDE 5 MG: 5 TABLET ORAL at 14:22

## 2022-01-01 RX ADMIN — ENOXAPARIN SODIUM 40 MG: 40 INJECTION SUBCUTANEOUS at 08:25

## 2022-01-01 RX ADMIN — FLUTICASONE PROPIONATE 1 SPRAY: 50 SPRAY, METERED NASAL at 10:42

## 2022-01-01 RX ADMIN — PIPERACILLIN AND TAZOBACTAM 3.38 G: 36; 4.5 INJECTION, POWDER, FOR SOLUTION INTRAVENOUS at 12:41

## 2022-01-01 RX ADMIN — CEFEPIME HYDROCHLORIDE 2000 MG: 2 INJECTION, POWDER, FOR SOLUTION INTRAVENOUS at 14:09

## 2022-01-01 RX ADMIN — FUROSEMIDE 20 MG: 10 INJECTION, SOLUTION INTRAMUSCULAR; INTRAVENOUS at 15:48

## 2022-01-01 RX ADMIN — LORATADINE 10 MG: 10 TABLET ORAL at 08:52

## 2022-01-01 RX ADMIN — FENTANYL CITRATE 25 MCG: 50 INJECTION, SOLUTION INTRAMUSCULAR; INTRAVENOUS at 09:12

## 2022-01-01 RX ADMIN — SODIUM CHLORIDE, SODIUM GLUCONATE, SODIUM ACETATE, POTASSIUM CHLORIDE, MAGNESIUM CHLORIDE, SODIUM PHOSPHATE, DIBASIC, AND POTASSIUM PHOSPHATE 75 ML/HR: .53; .5; .37; .037; .03; .012; .00082 INJECTION, SOLUTION INTRAVENOUS at 21:32

## 2022-01-01 RX ADMIN — FENTANYL CITRATE 25 MCG: 50 INJECTION, SOLUTION INTRAMUSCULAR; INTRAVENOUS at 09:02

## 2022-01-01 RX ADMIN — METHYLNALTREXONE BROMIDE 8 MG: 8 INJECTION, SOLUTION SUBCUTANEOUS at 19:17

## 2022-01-01 RX ADMIN — OXYCODONE HYDROCHLORIDE 5 MG: 5 TABLET ORAL at 18:42

## 2022-01-01 RX ADMIN — MIDAZOLAM 0.5 MG: 1 INJECTION INTRAMUSCULAR; INTRAVENOUS at 09:30

## 2022-01-01 RX ADMIN — ACETAMINOPHEN 650 MG: 325 TABLET, FILM COATED ORAL at 10:47

## 2022-01-01 RX ADMIN — ENOXAPARIN SODIUM 40 MG: 40 INJECTION SUBCUTANEOUS at 08:10

## 2022-01-01 RX ADMIN — DIPHENHYDRAMINE HYDROCHLORIDE 25 MG: 50 INJECTION, SOLUTION INTRAMUSCULAR; INTRAVENOUS at 12:35

## 2022-01-01 RX ADMIN — DEXAMETHASONE SODIUM PHOSPHATE: 10 INJECTION, SOLUTION INTRAMUSCULAR; INTRAVENOUS at 10:57

## 2022-01-01 RX ADMIN — LORATADINE 10 MG: 10 TABLET ORAL at 08:25

## 2022-01-01 RX ADMIN — ONDANSETRON 4 MG: 2 INJECTION INTRAMUSCULAR; INTRAVENOUS at 22:41

## 2022-01-01 RX ADMIN — METOCLOPRAMIDE HYDROCHLORIDE 10 MG: 5 INJECTION INTRAMUSCULAR; INTRAVENOUS at 21:31

## 2022-01-01 RX ADMIN — NOREPINEPHRINE BITARTRATE 4 MCG: 1 INJECTION, SOLUTION, CONCENTRATE INTRAVENOUS at 15:18

## 2022-01-01 RX ADMIN — ACETAMINOPHEN 650 MG: 325 TABLET ORAL at 11:13

## 2022-01-01 RX ADMIN — ACETAMINOPHEN 650 MG: 325 TABLET ORAL at 19:23

## 2022-01-01 RX ADMIN — ONDANSETRON 4 MG: 2 INJECTION INTRAMUSCULAR; INTRAVENOUS at 14:05

## 2022-01-01 RX ADMIN — DEXTROSE 20 ML/HR: 50 INJECTION, SOLUTION INTRAVENOUS at 11:52

## 2022-01-01 RX ADMIN — ENOXAPARIN SODIUM 40 MG: 40 INJECTION SUBCUTANEOUS at 09:14

## 2022-01-01 RX ADMIN — METOCLOPRAMIDE HYDROCHLORIDE 10 MG: 5 INJECTION INTRAMUSCULAR; INTRAVENOUS at 08:52

## 2022-01-01 RX ADMIN — SODIUM CHLORIDE: 9 INJECTION, SOLUTION INTRAVENOUS at 13:18

## 2022-01-01 RX ADMIN — LORATADINE 10 MG: 10 TABLET ORAL at 08:11

## 2022-01-01 RX ADMIN — OXYCODONE HYDROCHLORIDE 5 MG: 5 TABLET ORAL at 06:31

## 2022-01-01 RX ADMIN — FLUTICASONE PROPIONATE 1 SPRAY: 50 SPRAY, METERED NASAL at 10:11

## 2022-01-01 RX ADMIN — HYDROMORPHONE HYDROCHLORIDE 0.2 MG: 0.2 INJECTION, SOLUTION INTRAMUSCULAR; INTRAVENOUS; SUBCUTANEOUS at 08:25

## 2022-01-01 RX ADMIN — ONDANSETRON 4 MG: 2 INJECTION INTRAMUSCULAR; INTRAVENOUS at 22:29

## 2022-01-01 RX ADMIN — ONDANSETRON 4 MG: 2 INJECTION INTRAMUSCULAR; INTRAVENOUS at 21:25

## 2022-01-01 RX ADMIN — MIRTAZAPINE 7.5 MG: 15 TABLET, FILM COATED ORAL at 21:21

## 2022-01-01 RX ADMIN — MORPHINE SULFATE 6 MG: 10 INJECTION INTRAVENOUS at 22:42

## 2022-01-01 RX ADMIN — OXYCODONE HYDROCHLORIDE 5 MG: 5 TABLET ORAL at 17:47

## 2022-01-01 RX ADMIN — SENNOSIDES AND DOCUSATE SODIUM 1 TABLET: 50; 8.6 TABLET ORAL at 09:14

## 2022-01-01 RX ADMIN — ACETAMINOPHEN 650 MG: 325 TABLET ORAL at 12:30

## 2022-01-01 RX ADMIN — PROPOFOL 30 MG: 10 INJECTION, EMULSION INTRAVENOUS at 15:31

## 2022-01-01 RX ADMIN — ONDANSETRON 4 MG: 2 INJECTION INTRAMUSCULAR; INTRAVENOUS at 14:34

## 2022-01-01 RX ADMIN — MIDAZOLAM 1 MG: 1 INJECTION INTRAMUSCULAR; INTRAVENOUS at 08:52

## 2022-01-01 RX ADMIN — ACETAMINOPHEN 650 MG: 325 TABLET, FILM COATED ORAL at 11:00

## 2022-01-01 RX ADMIN — SODIUM CHLORIDE: 0.9 INJECTION, SOLUTION INTRAVENOUS at 15:24

## 2022-01-01 RX ADMIN — GEMCITABINE 2000 MG: 38 INJECTION, SOLUTION INTRAVENOUS at 09:28

## 2022-01-01 RX ADMIN — IOHEXOL 45 ML: 240 INJECTION, SOLUTION INTRATHECAL; INTRAVASCULAR; INTRAVENOUS; ORAL at 15:45

## 2022-01-01 RX ADMIN — LUBIPROSTONE 24 MCG: 24 CAPSULE, GELATIN COATED ORAL at 08:38

## 2022-01-01 RX ADMIN — METOCLOPRAMIDE HYDROCHLORIDE 10 MG: 5 INJECTION INTRAMUSCULAR; INTRAVENOUS at 20:12

## 2022-01-01 RX ADMIN — MIRTAZAPINE 7.5 MG: 15 TABLET, FILM COATED ORAL at 21:31

## 2022-01-01 RX ADMIN — SENNOSIDES AND DOCUSATE SODIUM 2 TABLET: 50; 8.6 TABLET ORAL at 08:12

## 2022-01-01 RX ADMIN — ENOXAPARIN SODIUM 40 MG: 40 INJECTION SUBCUTANEOUS at 10:11

## 2022-01-01 RX ADMIN — FENTANYL CITRATE 25 MCG: 50 INJECTION, SOLUTION INTRAMUSCULAR; INTRAVENOUS at 08:57

## 2022-01-01 RX ADMIN — ONDANSETRON 4 MG: 2 INJECTION INTRAMUSCULAR; INTRAVENOUS at 09:09

## 2022-01-01 RX ADMIN — SODIUM CHLORIDE, SODIUM GLUCONATE, SODIUM ACETATE, POTASSIUM CHLORIDE, MAGNESIUM CHLORIDE, SODIUM PHOSPHATE, DIBASIC, AND POTASSIUM PHOSPHATE 75 ML/HR: .53; .5; .37; .037; .03; .012; .00082 INJECTION, SOLUTION INTRAVENOUS at 09:54

## 2022-01-01 RX ADMIN — FENTANYL CITRATE 50 MCG: 50 INJECTION, SOLUTION INTRAMUSCULAR; INTRAVENOUS at 08:52

## 2022-01-01 RX ADMIN — PEGFILGRASTIM 6 MG: KIT SUBCUTANEOUS at 14:54

## 2022-01-01 RX ADMIN — SODIUM CHLORIDE 20 ML/HR: 0.9 INJECTION, SOLUTION INTRAVENOUS at 09:49

## 2022-01-01 RX ADMIN — ACETAMINOPHEN 650 MG: 325 TABLET, FILM COATED ORAL at 10:35

## 2022-01-01 RX ADMIN — DEXAMETHASONE SODIUM PHOSPHATE: 10 INJECTION, SOLUTION INTRAMUSCULAR; INTRAVENOUS at 09:07

## 2022-01-01 RX ADMIN — OXYCODONE HYDROCHLORIDE 5 MG: 5 TABLET ORAL at 01:10

## 2022-01-01 RX ADMIN — LORATADINE 10 MG: 10 TABLET ORAL at 09:14

## 2022-01-01 RX ADMIN — CEFAZOLIN SODIUM 2000 MG: 2 SOLUTION INTRAVENOUS at 08:59

## 2022-01-01 RX ADMIN — PEGFILGRASTIM 6 MG: KIT SUBCUTANEOUS at 15:03

## 2022-01-01 RX ADMIN — FUROSEMIDE 20 MG: 10 INJECTION, SOLUTION INTRAMUSCULAR; INTRAVENOUS at 14:10

## 2022-01-18 NOTE — TELEPHONE ENCOUNTER
Pt's daughter, Luci Law, left  mssg stating she needs medical advice for her father; reports he has a colon Tues but he is in bad shape; has lost 10-15 lbs in 3 wks and is not himself; considered ER but hesitant due to Covid  CB# [phone]; if HIPAA problem can call them

## 2022-01-18 NOTE — TELEPHONE ENCOUNTER
Has been on amitiza for a few weeks now  Has lost 15 pounds in 4 weeks  Loss of appetite, not eating as much  Pain starts a while after eating  abd pain all the time - below belly button right/left - pain usually 1-2 but never quite goes away, fetal position helps relieve pain;  Extreme fatigue, sleeping all the time, lack of energy  Sometimes increases 4-5  Bloating, gas  Stomach very noisy  This morning woke up with dark brown urine  Advised to call his urologist about the urine and to increase fluids  No nausea  No vomiting  No fever  Moving bowels - loose stools on Miralax once a day/Amitiza BID  Going once a day(sometimes in 2 trips)  Patient does not feel he has constipation any more and is not sure about a 2 day prep? Offered to move colonoscopy to this Thursday or Friday but pt wants Dr Farzaneh Rodrigues opinion on having scope done by different provider and pt also needs answer re prep - 2 day vs 1 day?

## 2022-01-19 PROBLEM — K83.1 OBSTRUCTIVE JAUNDICE: Status: ACTIVE | Noted: 2022-01-01

## 2022-01-19 PROBLEM — C80.1 OBSTRUCTIVE JAUNDICE DUE TO CANCER (HCC): Status: ACTIVE | Noted: 2022-01-01

## 2022-01-19 PROBLEM — K86.89 PANCREATIC MASS: Status: ACTIVE | Noted: 2022-01-01

## 2022-01-19 NOTE — PROGRESS NOTES
[id number] - Outpatient Follow-up Note  Zita Valera 67 y o  male MRN: [id number]  Encounter: [id number]    ASSESSMENT AND PLAN:      1  Pancreatic mass    --With increasing problems over the last month  He was seen in the office with a 2 month history of upper abdominal pain and change in bowel habit and constipation  We concentrated on laxatives and doing a colonoscopy  Over the last 4 weeks or so patient has lost 15 lb and has had ongoing pain  Notice the occurrence of dark urine  CT scan of the abdomen and pelvis was obtained and reveals a 6 4 x  by 5 7 cm mass at the head of the pancreas suspicious for carcinoma  There is impingement on the duodenum  There is ductal dilation as well  -  Patient without fever chills  Has markedly decreased appetite and ongoing pain    - Cancer antigen 19-9; Future  - discuss therapies  Patient would not be a candidate for resection given the fact that there appears to be liver metastasis and some diffuse adenopathy on CT scan  May be a candidate for palliative chemo and radiation  - patient would like to be evaluated at a tertiary care center  Will call Pema Alonzo in the a m  and see if he could get an appointment  I will contact the GI people down there as patient will require ERCP and stenting possibly endoscopic ultrasound with biopsy of the mass    Did offer patient to try to facilitate local care either at [facility] or [facility] with [address]  In any event he will need advanced endoscopy for stenting of the biliary obstruction    -  As an alternative we could do the ERCP at [facility]  I have advised the patient that if he gets fever chills worsening symptoms to proceed to the ED that facility  ERCP may be a little more difficult given duodenal findings  - may be helpful to get an endoscopic ultrasound of the time and biopsy    2   Obstructive jaundice  --- patient with a bilirubin of 4  Has notice the occurrence of the dark urine in the last couple of days  Will check INR prior to procedure  - Protime-INR; Future      3  Weight loss  -- 15 lb in the last 4 weeks  Patient with decreased appetite no doubt related to problem 1     4  Change in bowel habit  -- patient now moving his bowels reasonably well  Can continue Amitiza as needed  Will cancel colonoscopy    5  BRCA gene mutation positive in male  -- patient's mother had pancreatic cancer in her late 46s daughter had breast cancer in her late 35s  - brother had a history of pancreatic cancer      Followup Appointment:  PRN  ______________________________________________________________________    Chief Complaint   Patient presents with    review CT     HPI:  The patient returns to the office today for ongoing symptoms that became more alarming over the last several days  Patient called the office yesterday complaining of increasing abdominal pain  He had been moving his bowels but reported poor appetite and a 15 lb weight loss over the last 1 month  We had focused on problems with constipation has had major change in bowel habit and he was scheduled for colonoscopy next week  Patient did report in addition to the pain he notice the occurrence of dark urine  Patient has had anorexia but no vomiting He had lab studies performed today and emergent CT scan  Lab studies revealed significantly abnormal liver function studies with elevated transaminases alkaline phosphatase and total bilirubin  CBC showed a drop in his hemoglobin to 10 2 from a baseline of 12  It should be noted the patient's liver function studies were normal back in October  Patient reports that his present pain was not unlike his pain when he had problems with his gallbladder in the past  CT scan with oral and IV contrast was performed  Note was made of a large mass at the head of the pancreas with necrotizing features    Lesion was 6 4 x 5 7 x 4 9 cm  With impingement in the duodenum and biliary ductal dilation  In addition to multiple lesions were noted in the left lobe of the liver the largest being 2 2 cm  Patient also had associated adenopathy  Should be noted patient reports just dull ongoing pain  Mother had a history of pancreatic cancer in her late 46s  Patient has tested positive for the BRCA 2 gene and her his daughter had breast cancer at a young age    Historical Information   Past Medical History:   Diagnosis Date    CAD (coronary artery disease)     GERD (gastroesophageal reflux disease)     Hyperlipidemia     Hypertension     Prostate cancer ([address] )      Past Surgical History:   Procedure Laterality Date    CHOLECYSTECTOMY      CORONARY ANGIOPLASTY WITH STENT PLACEMENT       Social History     Substance and Sexual Activity   Alcohol Use Not Currently     Social History     Substance and Sexual Activity   Drug Use Never     Social History     Tobacco Use   Smoking Status Never Smoker   Smokeless Tobacco Not on file     Family History   Problem Relation Age of Onset    Pancreatic cancer Mother     Dementia Father     Esophageal cancer Sister          Current Outpatient Medications:     aspirin (ECOTRIN LOW STRENGTH) 81 mg EC tablet    atorvastatin (LIPITOR) 40 mg tablet    carvedilol (COREG) 12 5 mg tablet    cetirizine (ZyrTEC) 10 mg tablet    lisinopril (ZESTRIL) 10 mg tablet    lubiprostone (AMITIZA) 24 mcg capsule    pantoprazole (PROTONIX) 20 mg tablet    Sod Picosulfate-Mag Ox-Cit Acd (Clenpiq) 10-3 5-12 MG-GM -GM/160ML SOLN  No current facility-administered medications for this visit  Facility-Administered Medications Ordered in Other Visits:     iohexol (OMNIPAQUE) 240 MG/ML solution 50 mL, 50 mL, Oral, Once in imaging  No Known Allergies  Reviewed medications and allergies and updated as indicated    PHYSICAL EXAM:    Blood pressure 102/68, height 5' 8" (1 727 m), weight 88 9 kg (196 lb)   Body mass index is 29 8 kg/m²  General Appearance: NAD, cooperative, alert  Eyes: Anicteric,  Conjunctiva pale  positive icterus  ENT:  Normocephalic, atraumatic, normal mucosa  Neck:  Supple, symmetrical, trachea midline  Resp:  Clear to auscultation bilaterally; no rales, rhonchi or wheezing; respirations unlabored   CV:  S1 S2, Regular rate and rhythm; no murmur, rub, or gallop  GI:  Soft, non-tender, non-distended; normal bowel sounds; no masses, no organomegaly   - I do not appreciate a mass on careful palpation  May be some irregularity of the liver on palpation  Rectal: Deferred  Musculoskeletal: No cyanosis, clubbing or edema  Normal ROM  Skin:  No jaundice, rashes, or lesions   Heme/Lymph: No palpable cervical lymphadenopathy  Psych: Normal affect, good eye contact  Neuro: No gross deficits, AAOx3    Lab Results:   Lab Results   Component Value Date    WBC 4 92 01/19/2022    HGB 10 2 (L) 01/19/2022    HCT 31 7 (L) 01/19/2022    MCV 94 01/19/2022     01/19/2022     Lab Results   Component Value Date    K 4 1 01/19/2022     01/19/2022    CO2 29 01/19/2022    BUN 14 01/19/2022    CREATININE 1 26 01/19/2022    GLUF 159 (H) 01/19/2022    CALCIUM 8 9 01/19/2022    CORRECTEDCA 9 6 01/19/2022     (H) 01/19/2022     (H) 01/19/2022    ALKPHOS 330 (H) 01/19/2022    EGFR 54 01/19/2022     No results found for: IRON, TIBC, FERRITIN  No results found for: LIPASE    Radiology Results:   CT abdomen pelvis w contrast    Result Date: 1/19/2022  Narrative: CT ABDOMEN AND PELVIS WITH IV CONTRAST INDICATION:   R10 10: Upper abdominal pain, unspecified C61: Malignant neoplasm of prostate R82 998: Other abnormal findings in urine R63 4: Abnormal weight loss R53 83: Other fatigue R19 4: Change in bowel habit  COMPARISON:  None  TECHNIQUE:  CT examination of the abdomen and pelvis was performed  Axial, sagittal, and coronal 2D reformatted images were created from the source data and submitted for interpretation   Radiation dose length product (DLP) for this visit:  1215 76 mGy-cm   This examination, like all CT scans performed in the [facility], was performed utilizing techniques to minimize radiation dose exposure, including the use of iterative reconstruction and automated exposure control  IV Contrast:  100 mL of iohexol (OMNIPAQUE) Enteric Contrast:  Enteric contrast was administered  FINDINGS: ABDOMEN LOWER CHEST:  No clinically significant abnormality identified in the visualized lower chest  LIVER/BILIARY TREE: There are multiple hypoattenuating rounded masses scattered throughout the liver measuring up to 2 2 cm in the left hepatic lobe worrisome for metastases  Moderate intra and extrahepatic biliary ductal dilatation is seen  The common  bile duct is dilated measuring up to 1 5 cm  GALLBLADDER:  Absent  SPLEEN:  Unremarkable  PANCREAS:  There is a large 6 4 x 5 7 x 4 9 cm obstructing pancreatic head mass with low-attenuation center suggestive of necrosis  This mass is inseparable from the duodenum and abuts the main portal vein  Mild atrophy is seen of the pancreatic body and tail  ADRENAL GLANDS:  Unremarkable  KIDNEYS/URETERS:  Bilateral renal cysts measuring up to 12 4 cm on the left  No hydronephrosis  STOMACH AND BOWEL:  No evidence for bowel obstruction  APPENDIX:  No findings to suggest appendicitis  ABDOMINOPELVIC CAVITY:  No ascites  No pneumoperitoneum  Retroperitoneal lymphadenopathy is seen  For example, a left para-aortic lymph node measures 2 9 x 2 0 cm  There is right retrocrural lymphadenopathy with the largest node measuring 2 6 x 1 3 cm  Periportal lymphadenopathy measures up to 1 8 cm  VESSELS: The left renal vein is significantly compressed secondary to the pancreatic mass  Atherosclerotic disease  No abdominal aortic aneurysm  PELVIS REPRODUCTIVE ORGANS:  Prostate radiation treatment markers  URINARY BLADDER:  Unremarkable   ABDOMINAL WALL/INGUINAL REGIONS:  Tiny fat-containing umbilical hernia  OSSEOUS STRUCTURES:  No acute fracture  Mild diffusely mottled appearance of the pelvic osseous structures may be sequela of prior prostate radiation treatment therapy although metastatic disease cannot be excluded  Impression: 6 4 x 5 7 x 4 9 cm obstructing pancreatic head mass with internal necrosis highly worrisome for primary pancreatic neoplasm  Multiple liver masses and retroperitoneal lymphadenopathy suggestive of metastatic disease  Moderate intra- and extrahepatic biliary ductal dilatation secondary to obstruction  Mild diffusely mottled appearance of the pelvic osseous structures may be sequela of prior prostate radiation treatment therapy although metastatic disease cannot be excluded  The study was marked in [facility] for immediate notification   Workstation performed: [id number]

## 2022-01-19 NOTE — LETTER
January 19, 2022     José Bonner, [address] [facility]  [address]    Patient: Bri Rhodes   YOB: 1945   Date of Visit: 1/19/2022       Dear Dr Luis Stephen: Thank you for referring Bri Rhodes to me for evaluation  Below are my notes for this consultation  If you have questions, please do not hesitate to call me  I look forward to following your patient along with you  Sincerely,        Abdulkadir Ellington MD        CC: No Recipients  Abdulkadir Ellington MD  1/19/2022  5:37 PM  Sign when Signing Visit  [facility] - Outpatient Follow-up Note  Bri Rhodes 67 y o  male MRN: [id number]  Encounter: [id number]    ASSESSMENT AND PLAN:      1  Pancreatic mass    --With increasing problems over the last month  He was seen in the office with a 2 month history of upper abdominal pain and change in bowel habit and constipation  We concentrated on laxatives and doing a colonoscopy  Over the last 4 weeks or so patient has lost 15 lb and has had ongoing pain  Notice the occurrence of dark urine  CT scan of the abdomen and pelvis was obtained and reveals a 6 4 x  by 5 7 cm mass at the head of the pancreas suspicious for carcinoma  There is impingement on the duodenum  There is ductal dilation as well  -  Patient without fever chills  Has markedly decreased appetite and ongoing pain    - Cancer antigen 19-9; Future  - discuss therapies  Patient would not be a candidate for resection given the fact that there appears to be liver metastasis and some diffuse adenopathy on CT scan  May be a candidate for palliative chemo and radiation  - patient would like to be evaluated at a tertiary care center  Will call Peg Michael in the a [facility]  and see if he could get an appointment  I will contact the GI people down there as patient will require ERCP and stenting possibly endoscopic ultrasound with biopsy of the mass        Did offer patient to [facility]  In any event he will need advanced endoscopy for stenting of the biliary obstruction    -  As an alternative we could do the ERCP at [facility]  I have advised the patient that if he gets fever chills worsening symptoms to proceed to the ED that facility  ERCP may be a little more difficult given duodenal findings  - may be helpful to get an endoscopic ultrasound of the time and biopsy    2  Obstructive jaundice  --- patient with a bilirubin of 4  Has notice the occurrence of the dark urine in the last couple of days  Will check INR prior to procedure  - Protime-INR; Future      3  Weight loss  -- 15 lb in the last 4 weeks  Patient with decreased appetite no doubt related to problem 1     4  Change in bowel habit  -- patient now moving his bowels reasonably well  Can continue Amitiza as needed  Will cancel colonoscopy    5  BRCA gene mutation positive in male  -- patient's mother had pancreatic cancer in her late 46s daughter had breast cancer in her late 35s  - brother had a history of pancreatic cancer      Followup Appointment:  PRN  ______________________________________________________________________    Chief Complaint   Patient presents with    review CT     HPI:  The patient returns to the office today for ongoing symptoms that became more alarming over the last several days  Patient called the office yesterday complaining of increasing abdominal pain  He had been moving his bowels but reported poor appetite and a 15 lb weight loss over the last 1 month  We had focused on problems with constipation has had major change in bowel habit and he was scheduled for colonoscopy next week  Patient did report in addition to the pain he notice the occurrence of dark urine  Patient has had anorexia but no vomiting He had lab studies performed today and emergent CT scan    Lab studies revealed significantly abnormal liver function studies with elevated transaminases alkaline phosphatase and total bilirubin  CBC showed a drop in his hemoglobin to 10 2 from a baseline of 12  It should be noted the patient's liver function studies were normal back in October  Patient reports that his present pain was not unlike his pain when he had problems with his gallbladder in the past  CT scan with oral and IV contrast was performed  Note was made of a large mass at the head of the pancreas with necrotizing features  Lesion was 6 4 x 5 7 x 4 9 cm  With impingement in the duodenum and biliary ductal dilation  In addition to multiple lesions were noted in the left lobe of the liver the largest being 2 2 cm  Patient also had associated adenopathy  Should be noted patient reports just dull ongoing pain  Mother had a history of pancreatic cancer in her late 46s    Patient has tested positive for the BRCA 2 gene and her his daughter had breast cancer at a young age    Historical Information   Past Medical History:   Diagnosis Date    CAD (coronary artery disease)     GERD (gastroesophageal reflux disease)     Hyperlipidemia     Hypertension     [address] [address] )      Past Surgical History:   Procedure Laterality Date    CHOLECYSTECTOMY      CORONARY ANGIOPLASTY WITH STENT PLACEMENT       Social History     Substance and Sexual Activity   Alcohol Use Not Currently     Social History     Substance and Sexual Activity   Drug Use Never     Social History     Tobacco Use   Smoking Status Never Smoker   Smokeless Tobacco Not on file     Family History   Problem Relation Age of Onset    Pancreatic cancer Mother     Dementia Father     Esophageal cancer Sister          Current Outpatient Medications:     aspirin (ECOTRIN LOW STRENGTH) 81 mg EC tablet    atorvastatin (LIPITOR) 40 mg tablet    carvedilol (COREG) 12 5 mg tablet    cetirizine (ZyrTEC) 10 mg tablet    lisinopril (ZESTRIL) 10 mg tablet   lubiprostone (AMITIZA) 24 mcg capsule    pantoprazole (PROTONIX) 20 mg tablet    Sod Picosulfate-Mag Ox-Cit Acd (Clenpiq) 10-3 5-12 MG-GM -GM/160ML SOLN  No current facility-administered medications for this visit  Facility-Administered Medications Ordered in Other Visits:     iohexol (OMNIPAQUE) 240 MG/ML solution 50 mL, 50 mL, Oral, Once in imaging  No Known Allergies  Reviewed medications and allergies and updated as indicated    PHYSICAL EXAM:    Blood pressure 102/68, height 5' 8" (1 727 m), weight 88 9 kg (196 lb)  Body mass index is 29 8 kg/m²  General Appearance: NAD, cooperative, alert  Eyes: Anicteric,  Conjunctiva pale  positive icterus  ENT:  Normocephalic, atraumatic, normal mucosa  Neck:  Supple, symmetrical, trachea midline  Resp:  Clear to auscultation bilaterally; no rales, rhonchi or wheezing; respirations unlabored   CV:  S1 S2, Regular rate and rhythm; no murmur, rub, or gallop  GI:  Soft, non-tender, non-distended; normal bowel sounds; no masses, no organomegaly   - I do not appreciate a mass on careful palpation  May be some irregularity of the liver on palpation  Rectal: Deferred  Musculoskeletal: No cyanosis, clubbing or edema  Normal ROM    Skin:  No jaundice, rashes, or lesions   Heme/Lymph: No palpable cervical lymphadenopathy  Psych: Normal affect, good eye contact  Neuro: No gross deficits, AAOx3    Lab Results:   Lab Results   Component Value Date    WBC 4 92 01/19/2022    HGB 10 2 (L) 01/19/2022    HCT 31 7 (L) 01/19/2022    MCV 94 01/19/2022     01/19/2022     Lab Results   Component Value Date    K 4 1 01/19/2022     01/19/2022    CO2 29 01/19/2022    BUN 14 01/19/2022    CREATININE 1 26 01/19/2022    GLUF 159 (H) 01/19/2022    CALCIUM 8 9 01/19/2022    CORRECTEDCA 9 6 01/19/2022     (H) 01/19/2022     (H) 01/19/2022    ALKPHOS 330 (H) 01/19/2022    EGFR 54 01/19/2022     No results found for: IRON, TIBC, FERRITIN  No results found for: LIPASE    Radiology Results:   CT abdomen pelvis w contrast    Result Date: 1/19/2022  Narrative: CT ABDOMEN AND PELVIS WITH IV CONTRAST INDICATION:   R10 10: Upper abdominal pain, unspecified C61: Malignant neoplasm of prostate R82 998: Other abnormal findings in urine R63 4: Abnormal weight loss R53 83: Other fatigue R19 4: Change in bowel habit  COMPARISON:  None  TECHNIQUE:  CT examination of the abdomen and pelvis was performed  Axial, sagittal, and coronal 2D reformatted images were created from the source data and submitted for interpretation  Radiation dose length product (DLP) for this visit:  1215 76 mGy-cm   This examination, like all CT scans performed in the [facility], was performed utilizing techniques to minimize radiation dose exposure, including the use of iterative reconstruction and automated exposure control  IV Contrast:  100 mL of iohexol (OMNIPAQUE) Enteric Contrast:  Enteric contrast was administered  FINDINGS: ABDOMEN LOWER CHEST:  No clinically significant abnormality identified in the visualized lower chest  LIVER/BILIARY TREE: There are multiple hypoattenuating rounded masses scattered throughout the liver measuring up to 2 2 cm in the left hepatic lobe worrisome for metastases  Moderate intra and extrahepatic biliary ductal dilatation is seen  The common  bile duct is dilated measuring up to 1 5 cm  GALLBLADDER:  Absent  SPLEEN:  Unremarkable  PANCREAS:  There is a large 6 4 x 5 7 x 4 9 cm obstructing pancreatic head mass with low-attenuation center suggestive of necrosis  This mass is inseparable from the duodenum and abuts the main portal vein  Mild atrophy is seen of the pancreatic body and tail  ADRENAL GLANDS:  Unremarkable  KIDNEYS/URETERS:  Bilateral renal cysts measuring up to 12 4 cm on the left  No hydronephrosis  STOMACH AND BOWEL:  No evidence for bowel obstruction  APPENDIX:  No findings to suggest appendicitis  ABDOMINOPELVIC CAVITY:  No ascites    No pneumoperitoneum  Retroperitoneal lymphadenopathy is seen  For example, a left para-aortic lymph node measures 2 9 x 2 0 cm  There is right retrocrural lymphadenopathy with the largest node measuring 2 6 x 1 3 cm  Periportal lymphadenopathy measures up to 1 8 cm  VESSELS: The left renal vein is significantly compressed secondary to the pancreatic mass  Atherosclerotic disease  No abdominal aortic aneurysm  PELVIS REPRODUCTIVE ORGANS:  Prostate radiation treatment markers  URINARY BLADDER:  Unremarkable  ABDOMINAL WALL/INGUINAL REGIONS:  Tiny fat-containing umbilical hernia  OSSEOUS STRUCTURES:  No acute fracture  Mild diffusely mottled appearance of the pelvic osseous structures may be sequela of prior prostate radiation treatment therapy although metastatic disease cannot be excluded  Impression: 6 4 x 5 7 x 4 9 cm obstructing pancreatic head mass with internal necrosis highly worrisome for primary pancreatic neoplasm  Multiple liver masses and retroperitoneal lymphadenopathy suggestive of metastatic disease  Moderate intra- and extrahepatic biliary ductal dilatation secondary to obstruction  Mild diffusely mottled appearance of the pelvic osseous structures may be sequela of prior prostate radiation treatment therapy although metastatic disease cannot be excluded  The study was marked in [facility] for immediate notification   Workstation performed: [id number]

## 2022-01-19 NOTE — LETTER
January 19, 2022     Anna Linder, [address]  [address] [place] [address]    Patient: Bennett Diez   YOB: 1945   Date of Visit: 1/19/2022       Dear Dr Asad Ramirez: Thank you for referring Bennett Diez to me for evaluation  Below are my notes for this consultation  If you have questions, please do not hesitate to call me  I look forward to following your patient along with you  Sincerely,        Bryanna Andrews MD        CC: No Recipients  Bryanna Andrews MD  1/19/2022  5:36 PM  Incomplete  [address] [facility] - Outpatient Follow-up Note  Bennett Deiz 67 y o  male MRN: [id number]  Encounter: [id number]    ASSESSMENT AND PLAN:      1  Pancreatic mass    --With increasing problems over the last month  He was seen in the office with a 2 month history of upper abdominal pain and change in bowel habit and constipation  We concentrated on laxatives and doing a colonoscopy  Over the last 4 weeks or so patient has lost 15 lb and has had ongoing pain  Notice the occurrence of dark urine  CT scan of the abdomen and pelvis was obtained and reveals a 6 4 x  by 5 7 cm mass at the head of the pancreas suspicious for carcinoma  There is impingement on the duodenum  There is ductal dilation as well  -  Patient without fever chills  Has markedly decreased appetite and ongoing pain    - Cancer antigen 19-9; Future  - discuss therapies  Patient would not be a candidate for resection given the fact that there appears to be liver metastasis and some diffuse adenopathy on CT scan  May be a candidate for palliative chemo and radiation  - patient would like to be evaluated at a tertiary care center  Will call Shanda Thompson in the a m  and see if he could get an appointment  I will contact the GI people down there as patient will require ERCP and stenting possibly endoscopic ultrasound with biopsy of the mass        Did offer patient to try to facilitate local care either at [facility] or [place] with [facility]  In any event he will need advanced endoscopy for stenting of the biliary obstruction    -  As an alternative we could do the ERCP at [facility]  I have advised the patient that if he gets fever chills worsening symptoms to proceed to the ED that facility  ERCP may be a little more difficult given duodenal findings  - may be helpful to get an endoscopic ultrasound of the time and biopsy    2  Obstructive jaundice  --- patient with a bilirubin of 4  Has notice the occurrence of the dark urine in the last couple of days  Will check INR prior to procedure  - Protime-INR; Future      3  Weight loss  -- 15 lb in the last 4 weeks  Patient with decreased appetite no doubt related to problem 1     4  Change in bowel habit  -- patient now moving his bowels reasonably well  Can continue Amitiza as needed  Will cancel colonoscopy    5  BRCA gene mutation positive in male  -- patient's mother had pancreatic cancer in her late 46s daughter had breast cancer in her late 35s  - brother had a history of pancreatic cancer      Followup Appointment:  PRN  ______________________________________________________________________    Chief Complaint   Patient presents with    review CT     HPI:  The patient returns to the office today for ongoing symptoms that became more alarming over the last several days  Patient called the office yesterday complaining of increasing abdominal pain  He had been moving his bowels but reported poor appetite and a 15 lb weight loss over the last 1 month  We had focused on problems with constipation has had major change in bowel habit and he was scheduled for colonoscopy next week  Patient did report in addition to the pain he notice the occurrence of dark urine  Patient has had anorexia but no vomiting He had lab studies performed today and emergent CT scan    Lab studies revealed significantly abnormal liver function studies with elevated transaminases alkaline phosphatase and total bilirubin  CBC showed a drop in his hemoglobin to 10 2 from a baseline of 12  It should be noted the patient's liver function studies were normal back in October  Patient reports that his present pain was not unlike his pain when he had problems with his gallbladder in the past  CT scan with oral and IV contrast was performed  Note was made of a large mass at the head of the pancreas with necrotizing features  Lesion was 6 4 x 5 7 x 4 9 cm  With impingement in the duodenum and biliary ductal dilation  In addition to multiple lesions were noted in the left lobe of the liver the largest being 2 2 cm  Patient also had associated adenopathy  Should be noted patient reports just dull ongoing pain  Mother had a history of pancreatic cancer in her late 46s    Patient has tested positive for the BRCA 2 gene and her his daughter had breast cancer at a young age    Historical Information   Past Medical History:   Diagnosis Date    CAD (coronary artery disease)     GERD (gastroesophageal reflux disease)     Hyperlipidemia     Hypertension     Prostate cancer ([facility] [address] )      Past Surgical History:   Procedure Laterality Date    CHOLECYSTECTOMY      CORONARY ANGIOPLASTY WITH STENT PLACEMENT       Social History     Substance and Sexual Activity   Alcohol Use Not Currently     Social History     Substance and Sexual Activity   Drug Use Never     Social History     Tobacco Use   Smoking Status Never Smoker   Smokeless Tobacco Not on file     Family History   Problem Relation Age of Onset    Pancreatic cancer Mother     Dementia Father     Esophageal cancer Sister          Current Outpatient Medications:     aspirin (ECOTRIN LOW STRENGTH) 81 mg EC tablet    atorvastatin (LIPITOR) 40 mg tablet    carvedilol (COREG) 12 5 mg tablet    cetirizine (ZyrTEC) 10 mg tablet    lisinopril (ZESTRIL) 10 mg tablet    lubiprostone (AMITIZA) 24 mcg capsule    pantoprazole (PROTONIX) 20 mg tablet    Sod Picosulfate-Mag Ox-Cit Acd (Clenpiq) 10-3 5-12 MG-GM -GM/160ML SOLN  No current facility-administered medications for this visit  Facility-Administered Medications Ordered in Other Visits:     iohexol (OMNIPAQUE) 240 MG/ML solution 50 mL, 50 mL, Oral, Once in imaging  No Known Allergies  Reviewed medications and allergies and updated as indicated    PHYSICAL EXAM:    Blood pressure 102/68, height 5' 8" (1 727 m), weight 88 9 kg (196 lb)  Body mass index is 29 8 kg/m²  General Appearance: NAD, cooperative, alert  Eyes: Anicteric,  Conjunctiva pale  positive icterus  ENT:  Normocephalic, atraumatic, normal mucosa  Neck:  Supple, symmetrical, trachea midline  Resp:  Clear to auscultation bilaterally; no rales, rhonchi or wheezing; respirations unlabored   CV:  S1 S2, Regular rate and rhythm; no murmur, rub, or gallop  GI:  Soft, non-tender, non-distended; normal bowel sounds; no masses, no organomegaly   - I do not appreciate a mass on careful palpation  May be some irregularity of the liver on palpation  Rectal: Deferred  Musculoskeletal: No cyanosis, clubbing or edema  Normal ROM    Skin:  No jaundice, rashes, or lesions   Heme/Lymph: No palpable cervical lymphadenopathy  Psych: Normal affect, good eye contact  Neuro: No gross deficits, AAOx3    Lab Results:   Lab Results   Component Value Date    WBC 4 92 01/19/2022    HGB 10 2 (L) 01/19/2022    HCT 31 7 (L) 01/19/2022    MCV 94 01/19/2022     01/19/2022     Lab Results   Component Value Date    K 4 1 01/19/2022     01/19/2022    CO2 29 01/19/2022    BUN 14 01/19/2022    CREATININE 1 26 01/19/2022    GLUF 159 (H) 01/19/2022    CALCIUM 8 9 01/19/2022    CORRECTEDCA 9 6 01/19/2022     (H) 01/19/2022     (H) 01/19/2022    ALKPHOS 330 (H) 01/19/2022    EGFR 54 01/19/2022     No results found for: IRON, TIBC, FERRITIN  No results found for: LIPASE    Radiology Results:   CT abdomen pelvis w contrast    Result Date: 1/19/2022  Narrative: CT ABDOMEN AND PELVIS WITH IV CONTRAST INDICATION:   R10 10: Upper abdominal pain, unspecified C61: Malignant neoplasm of prostate R82 998: Other abnormal findings in urine R63 4: Abnormal weight loss R53 83: Other fatigue R19 4: Change in bowel habit  COMPARISON:  None  TECHNIQUE:  CT examination of the abdomen and pelvis was performed  Axial, sagittal, and coronal 2D reformatted images were created from the source data and submitted for interpretation  Radiation dose length product (DLP) for this visit:  1215 76 mGy-cm   This examination, like all CT scans performed in the [facility], was performed utilizing techniques to minimize radiation dose exposure, including the use of iterative reconstruction and automated exposure control  IV Contrast:  100 mL of iohexol (OMNIPAQUE) Enteric Contrast:  Enteric contrast was administered  FINDINGS: ABDOMEN LOWER CHEST:  No clinically significant abnormality identified in the visualized lower chest  LIVER/BILIARY TREE: There are multiple hypoattenuating rounded masses scattered throughout the liver measuring up to 2 2 cm in the left hepatic lobe worrisome for metastases  Moderate intra and extrahepatic biliary ductal dilatation is seen  The common  bile duct is dilated measuring up to 1 5 cm  GALLBLADDER:  Absent  SPLEEN:  Unremarkable  PANCREAS:  There is a large 6 4 x 5 7 x 4 9 cm obstructing pancreatic head mass with low-attenuation center suggestive of necrosis  This mass is inseparable from the duodenum and abuts the main portal vein  Mild atrophy is seen of the pancreatic body and tail  ADRENAL GLANDS:  Unremarkable  KIDNEYS/URETERS:  Bilateral renal cysts measuring up to 12 4 cm on the left  No hydronephrosis  STOMACH AND BOWEL:  No evidence for bowel obstruction  APPENDIX:  No findings to suggest appendicitis  ABDOMINOPELVIC CAVITY:  No ascites  No pneumoperitoneum  Retroperitoneal lymphadenopathy is seen  For example, a left para-aortic lymph node measures 2 9 x 2 0 cm  There is right retrocrural lymphadenopathy with the largest node measuring 2 6 x 1 3 cm  Periportal lymphadenopathy measures up to 1 8 cm  VESSELS: The left renal vein is significantly compressed secondary to the pancreatic mass  Atherosclerotic disease  No abdominal aortic aneurysm  PELVIS REPRODUCTIVE ORGANS:  Prostate radiation treatment markers  URINARY BLADDER:  Unremarkable  ABDOMINAL WALL/INGUINAL REGIONS:  Tiny fat-containing umbilical hernia  OSSEOUS STRUCTURES:  No acute fracture  Mild diffusely mottled appearance of the pelvic osseous structures may be sequela of prior prostate radiation treatment therapy although metastatic disease cannot be excluded  Impression: 6 4 x 5 7 x 4 9 cm obstructing pancreatic head mass with internal necrosis highly worrisome for primary pancreatic neoplasm  Multiple liver masses and retroperitoneal lymphadenopathy suggestive of metastatic disease  Moderate intra- and extrahepatic biliary ductal dilatation secondary to obstruction  Mild diffusely mottled appearance of the pelvic osseous structures may be sequela of prior prostate radiation treatment therapy although metastatic disease cannot be excluded  The study was marked in [facility] for immediate notification  Workstation performed: [id number]     Dominique Lopez MD  1/19/2022  4:45 PM  Sign when Signing Visit  [facility] [place] [facility] - Outpatient Follow-up Note  Ralf Brice 67 y o  male MRN: [id number]  Encounter: [id number]    ASSESSMENT AND PLAN:      1  Pancreatic mass    --With increasing problems over the last month  He was seen in the office with a 2 month history of upper abdominal pain and change in bowel habit and constipation  We concentrated on laxatives and doing a colonoscopy  Over the last 4 weeks or so patient has lost 15 lb and has had ongoing pain    Notice the occurrence of dark urine  CT scan of the abdomen and pelvis was obtained and reveals a 6 4 x  by 5 7 cm mass at the head of the pancreas suspicious for carcinoma  There is impingement on the duodenum  There is ductal dilation as well  -  Patient without fever chills  Has markedly decreased appetite and ongoing pain    - Cancer antigen 19-9; Future  - discuss therapies  Patient would not be a candidate for resection given the fact that there appears to be liver metastasis and some diffuse adenopathy on CT scan  May be a candidate for palliative chemo and radiation  - patient would like to be evaluated at a tertiary care center  Will call Asa Brasher in the a m  and see if he could get an appointment  I will contact the GI people down there as patient will require ERCP and stenting possibly endoscopic ultrasound with biopsy of the mass  -  As an alternative we could do the ERCP at [facility]  I have advised the patient that if he gets fever chills worsening symptoms to proceed to the ED that facility  ERCP may be a little more difficult given duodenal findings  - may be helpful to get an endoscopic ultrasound of the time and biopsy    2  Obstructive jaundice  --- patient with a bilirubin of 4  Has notice the occurrence of the dark urine in the last couple of days  Will check INR prior to procedure  - Protime-INR; Future      3  Weight loss  -- 15 lb in the last 4 weeks  Patient with decreased appetite no doubt related to problem 1     4  Change in bowel habit  -- patient now moving his bowels reasonably well  Can continue Amitiza as needed  Will cancel colonoscopy    5  BRCA gene mutation positive in male  -- patient's mother had pancreatic cancer in her late 46s daughter had breast cancer in her late 35s      - brother had a history of pancreatic cancer      Followup Appointment:  PRN  ______________________________________________________________________    Chief Complaint   Patient presents with    review CT     HPI:  ***    Historical Information   Past Medical History:   Diagnosis Date    CAD (coronary artery disease)     GERD (gastroesophageal reflux disease)     Hyperlipidemia     Hypertension     Prostate cancer ([address] )      Past Surgical History:   Procedure Laterality Date    CHOLECYSTECTOMY      CORONARY ANGIOPLASTY WITH STENT PLACEMENT       Social History     Substance and Sexual Activity   Alcohol Use Not Currently     Social History     Substance and Sexual Activity   Drug Use Never     Social History     Tobacco Use   Smoking Status Never Smoker   Smokeless Tobacco Not on file     Family History   Problem Relation Age of Onset    Pancreatic cancer Mother     Dementia Father     Esophageal cancer Sister          Current Outpatient Medications:     aspirin (ECOTRIN LOW STRENGTH) 81 mg EC tablet    atorvastatin (LIPITOR) 40 mg tablet    carvedilol (COREG) 12 5 mg tablet    cetirizine (ZyrTEC) 10 mg tablet    lisinopril (ZESTRIL) 10 mg tablet    lubiprostone (AMITIZA) 24 mcg capsule    pantoprazole (PROTONIX) 20 mg tablet    Sod Picosulfate-Mag Ox-Cit Acd (Clenpiq) 10-3 5-12 MG-GM -GM/160ML SOLN  No current facility-administered medications for this visit  Facility-Administered Medications Ordered in Other Visits:     iohexol (OMNIPAQUE) 240 MG/ML solution 50 mL, 50 mL, Oral, Once in imaging  No Known Allergies  Reviewed medications and allergies and updated as indicated    PHYSICAL EXAM:    Blood pressure 102/68, height 5' 8" (1 727 m), weight 88 9 kg (196 lb)  Body mass index is 29 8 kg/m²  General Appearance: NAD, cooperative, alert  Eyes: Anicteric, PERRLA, EOMI  ENT:  Normocephalic, atraumatic, normal mucosa  Neck:  Supple, symmetrical, trachea midline  Resp:  Clear to auscultation bilaterally; no rales, rhonchi or wheezing; respirations unlabored   CV:  S1 S2, Regular rate and rhythm; no murmur, rub, or gallop    GI:  Soft, non-tender, non-distended; normal bowel sounds; no masses, no organomegaly   Rectal: Deferred  Musculoskeletal: No cyanosis, clubbing or edema  Normal ROM  Skin:  No jaundice, rashes, or lesions   Heme/Lymph: No palpable cervical lymphadenopathy  Psych: Normal affect, good eye contact  Neuro: No gross deficits, AAOx3    Lab Results:   Lab Results   Component Value Date    WBC 4 92 01/19/2022    HGB 10 2 (L) 01/19/2022    HCT 31 7 (L) 01/19/2022    MCV 94 01/19/2022     01/19/2022     Lab Results   Component Value Date    K 4 1 01/19/2022     01/19/2022    CO2 29 01/19/2022    BUN 14 01/19/2022    CREATININE 1 26 01/19/2022    GLUF 159 (H) 01/19/2022    CALCIUM 8 9 01/19/2022    CORRECTEDCA 9 6 01/19/2022     (H) 01/19/2022     (H) 01/19/2022    ALKPHOS 330 (H) 01/19/2022    EGFR 54 01/19/2022     No results found for: IRON, TIBC, FERRITIN  No results found for: LIPASE    Radiology Results:   CT abdomen pelvis w contrast    Result Date: 1/19/2022  Narrative: CT ABDOMEN AND PELVIS WITH IV CONTRAST INDICATION:   R10 10: Upper abdominal pain, unspecified C61: Malignant neoplasm of prostate R82 998: Other abnormal findings in urine R63 4: Abnormal weight loss R53 83: Other fatigue R19 4: Change in bowel habit  COMPARISON:  None  TECHNIQUE:  CT examination of the abdomen and pelvis was performed  Axial, sagittal, and coronal 2D reformatted images were created from the source data and submitted for interpretation  Radiation dose length product (DLP) for this visit:  1215 76 mGy-cm   This examination, like all CT scans performed in the [facility], was performed utilizing techniques to minimize radiation dose exposure, including the use of iterative reconstruction and automated exposure control  IV Contrast:  100 mL of iohexol (OMNIPAQUE) Enteric Contrast:  Enteric contrast was administered   FINDINGS: ABDOMEN LOWER CHEST:  No clinically significant abnormality identified in the visualized lower chest  LIVER/BILIARY TREE: There are multiple hypoattenuating rounded masses scattered throughout the liver measuring up to 2 2 cm in the left hepatic lobe worrisome for metastases  Moderate intra and extrahepatic biliary ductal dilatation is seen  The common  bile duct is dilated measuring up to 1 5 cm  GALLBLADDER:  Absent  SPLEEN:  Unremarkable  PANCREAS:  There is a large 6 4 x 5 7 x 4 9 cm obstructing pancreatic head mass with low-attenuation center suggestive of necrosis  This mass is inseparable from the duodenum and abuts the main portal vein  Mild atrophy is seen of the pancreatic body and tail  ADRENAL GLANDS:  Unremarkable  KIDNEYS/URETERS:  Bilateral renal cysts measuring up to 12 4 cm on the left  No hydronephrosis  STOMACH AND BOWEL:  No evidence for bowel obstruction  APPENDIX:  No findings to suggest appendicitis  ABDOMINOPELVIC CAVITY:  No ascites  No pneumoperitoneum  Retroperitoneal lymphadenopathy is seen  For example, a left para-aortic lymph node measures 2 9 x 2 0 cm  There is right retrocrural lymphadenopathy with the largest node measuring 2 6 x 1 3 cm  Periportal lymphadenopathy measures up to 1 8 cm  VESSELS: The left renal vein is significantly compressed secondary to the pancreatic mass  Atherosclerotic disease  No abdominal aortic aneurysm  PELVIS REPRODUCTIVE ORGANS:  Prostate radiation treatment markers  URINARY BLADDER:  Unremarkable  ABDOMINAL WALL/INGUINAL REGIONS:  Tiny fat-containing umbilical hernia  OSSEOUS STRUCTURES:  No acute fracture  Mild diffusely mottled appearance of the pelvic osseous structures may be sequela of prior prostate radiation treatment therapy although metastatic disease cannot be excluded  Impression: 6 4 x 5 7 x 4 9 cm obstructing pancreatic head mass with internal necrosis highly worrisome for primary pancreatic neoplasm  Multiple liver masses and retroperitoneal lymphadenopathy suggestive of metastatic disease    Moderate intra- and extrahepatic biliary ductal dilatation secondary to obstruction  Mild diffusely mottled appearance of the pelvic osseous structures may be sequela of prior prostate radiation treatment therapy although metastatic disease cannot be excluded  The study was marked in [facility] for immediate notification   Workstation performed: [id number]

## 2022-01-19 NOTE — TELEPHONE ENCOUNTER
Called and spoke with pt, he did not go to the ER but symptoms are the same  He would like to proceed with the testing  Orders placed stat for the lab work as well as the ct scan  Called his insurance at [address] [place] [address] , instructed to call [phone]  Spoke with Rukhsana Romano and notified no prior [facility] is required for his ct scan  Called Ref# [id number]  Called pt back, scheduling # given  He will call to schedule the ct scan and determine the best timing for the labs prior to the ct scan  He will call back with any scheduling issues  He is also aware of Dr Aicha Niño recommendation as far as the colonoscopy

## 2022-01-19 NOTE — PATIENT INSTRUCTIONS
[address] [facility] - Outpatient Follow-up Note  Dimitri Greene 67 y o  male MRN: [id number]  Encounter: [id number]    ASSESSMENT AND PLAN:      1  Pancreatic mass    --With increasing problems over the last month  He was seen in the office with a 2 month history of upper abdominal pain and change in bowel habit and constipation  We concentrated on laxatives and doing a colonoscopy  Over the last 4 weeks or so patient has lost 15 lb and has had ongoing pain  Notice the occurrence of dark urine  CT scan of the abdomen and pelvis was obtained and reveals a 6 4 x  by 5 7 cm mass at the head of the pancreas suspicious for carcinoma  There is impingement on the duodenum  There is ductal dilation as well  -  Patient without fever chills  Has markedly decreased appetite and ongoing pain    - Cancer antigen 19-9; Future  - discuss therapies  Patient would not be a candidate for resection given the fact that there appears to be liver metastasis and some diffuse adenopathy on CT scan  May be a candidate for palliative chemo and radiation  - patient would like to be evaluated at a tertiary care center  Will call Ninoska Backers in the a m  and see if he could get an appointment  I will contact the GI people down there as patient will require ERCP and stenting possibly endoscopic ultrasound with biopsy of the mass  -  As an alternative we could do the ERCP at [facility]  I have advised the patient that if he gets fever chills worsening symptoms to proceed to the ED that facility  ERCP may be a little more difficult given duodenal findings  - may be helpful to get an endoscopic ultrasound of the time and biopsy   Did offer patient to try to facilitate local care either at [facility] or [facility] with [address]  In any event he will need advanced endoscopy for stenting of the biliary obstruction    2  Obstructive jaundice  --- patient with a bilirubin of 4  Has notice the occurrence of the dark urine in the last couple of days  Will check INR prior to procedure  - Protime-INR; Future      3  Weight loss  -- 15 lb in the last 4 weeks  Patient with decreased appetite no doubt related to problem 1     4  Change in bowel habit  -- patient now moving his bowels reasonably well  Can continue Amitiza as needed  Will cancel colonoscopy    5  BRCA gene mutation positive in male  -- patient's mother had pancreatic cancer in her late 46s daughter had breast cancer in her late 35s      - brother had a history of pancreatic cancer      Followup Appointment:  PRN

## 2022-01-20 PROBLEM — D64.9 ANEMIA: Status: ACTIVE | Noted: 2022-01-01

## 2022-01-20 PROBLEM — N18.30 STAGE 3 CHRONIC KIDNEY DISEASE (HCC): Status: ACTIVE | Noted: 2022-01-01

## 2022-01-20 PROBLEM — Z98.61 HISTORY OF PTCA: Status: ACTIVE | Noted: 2022-01-01

## 2022-01-20 PROBLEM — I25.5 ISCHEMIC CARDIOMYOPATHY: Status: ACTIVE | Noted: 2022-01-01

## 2022-01-20 NOTE — CONSULTS
[facility] - Inpatient Consultation  Madisyn Ireland 67 y o  male MRN: [id number]  Unit/Bed#: [facility] 308-01 Encounter: [id number]    Reason for Consult / Principal Problem:     Obstructive jaundice, pancreatic mass    ASSESSMENT & PLAN:      65-year-old male with history of hypertension, hyperlipidemia, coronary disease presented with complaints of abdominal pain with new findings of obstructive jaundice and pancreatic head mass  GI consultation requested for evaluation of pancreatic head mass for need for possible EUS and ERCP  1  Pancreatic head mass, obstructive jaundice - highly suspicious for stage IV metastatic cancer from with primary pancreatic neoplasm  Evidence of liver masses as well as retroperitoneal lymphadenopathy  Causing obstruction in the distal biliary duct from compression  Likely related to his BRCA gene mutation increasing risk for malignancy  · Plan for EUS for tissue sampling and ERCP for biliary stenting and decompression if able   · NPO for procedure   · Further recommendations to follow pending endoscopic findings   · Check CA 19-9   · Depending on findings, would recommend medical oncology consultation  · Follow clinically with serial abdominal exams   · Follow LFTs  · Pain control and supportive care per primary team    2  BRCA mutation - reports family history of BRCA gene mutation  · Consider referral to genetic counselor for familial testing and appropriate screenings  ______________________________________________________________________    HISTORY OF PRESENT ILLNESS:  65-year-old male with history of hypertension, hyperlipidemia, coronary disease presented with complaints of abdominal pain  Patient initially saw his outpatient gastroenterologist last month and was suspected to have constipation as he reported having change in bowel habits  Patient was scheduled for outpatient colonoscopy    He presented back to the GI office yesterday due to worsening abdominal pain  Patient underwent blood work as well as CT imaging which revealed elevated LFTs and finding of new pancreatic head mass likely causing biliary obstruction  Patient was directed to the ED for further evaluation  CT imaging found 6 4 x 5 7 x 4 9 cm obstructing pancreatic head mass with internal necrosis highly worrisome for primary pancreatic neoplasm  Patient also noted to have multiple liver metastases as well as retroperitoneal lymphadenopathy  GI consultation requested for evaluation for possible ERCP as well as EUS for definitive diagnosis  Patient reports that he has had upper abdominal pain for the last month or so  The belly pain has gotten worse over the last week or so  He has had associated poor appetite and weight loss of approximately 15-20 lb over that time  He also reports family history of BRCA gene mutation  His mother passed away from pancreatic cancer  His brother had esophageal cancer  His daughter was diagnosed with breast cancer  He is a nonsmoker, nondrinker  He denies any nausea or vomiting at this time  Denies any hematochezia or melena        REVIEW OF SYSTEMS:    CONSTITUTIONAL: Denies any fever, chills, rigors  HEENT: No earache or tinnitus, denies hearing loss or visual disturbances  CARDIOVASCULAR: No chest pain or palpitations   RESPIRATORY: Denies any cough, hemoptysis, shortness of breath or dyspnea on exertion  GASTROINTESTINAL: As noted in the History of Present Illness   GENITOURINARY: No problems with urination, denies any hematuria or dysuria  NEUROLOGIC: No dizziness or vertigo, denies headaches   MUSCULOSKELETAL: Denies any muscle or joint pain   SKIN: Denies skin rashes or itching   ENDOCRINE: Denies excessive thirst, denies intolerance to heat or cold  PSYCHOSOCIAL: Denies depression or anxiety, denies any recent memory loss     Historical Information   Past Medical History:   Diagnosis Date    CAD (coronary artery disease)     GERD (gastroesophageal reflux disease)     Hyperlipidemia     Hypertension     [facility] [address] [facility]     Pro[facility] [address] [facility]      Past Surgical History:   Procedure Laterality Date    CHOLECYSTECTOMY      CORONARY ANGIOPLASTY WITH STENT PLACEMENT       Social History   Social History     Substance and Sexual Activity   Alcohol Use Not Currently     Social History     Substance and Sexual Activity   Drug Use Never     Social History     Tobacco Use   Smoking Status Never Smoker   Smokeless Tobacco Not on file     Family History   Problem Relation Age of Onset    Pancreatic cancer Mother     Dementia Father     Esophageal cancer Sister        Meds/Allergies   Medications Prior to Admission   Medication    aspirin (ECOTRIN LOW STRENGTH) 81 mg EC tablet    atorvastatin (LIPITOR) 40 mg tablet    carvedilol (COREG) 12 5 mg tablet    cetirizine (ZyrTEC) 10 mg tablet    lisinopril (ZESTRIL) 10 mg tablet    lubiprostone (AMITIZA) 24 mcg capsule    pantoprazole (PROTONIX) 20 mg tablet    Sod Picosulfate-Mag Ox-Cit Acd (Clenpiq) 10-3 5-12 MG-GM -GM/160ML SOLN     Current Facility-Administered Medications   Medication Dose Route Frequency    aluminum-magnesium hydroxide-simethicone (MYLANTA) oral suspension 30 mL  30 mL Oral Q6H PRN    carvedilol (COREG) tablet 12 5 mg  12 5 mg Oral BID With Meals    docusate sodium (COLACE) capsule 100 mg  100 mg Oral BID PRN    enoxaparin (LOVENOX) subcutaneous injection 40 mg  40 mg Subcutaneous Daily    HYDROmorphone HCl (DILAUDID) injection 0 2 mg  0 2 mg Intravenous Q3H PRN    lisinopril (ZESTRIL) tablet 10 mg  10 mg Oral HS    loratadine (CLARITIN) tablet 10 mg  10 mg Oral Daily    lubiprostone (AMITIZA) capsule 24 mcg  24 mcg Oral BID With Meals    multi-electrolyte (PLASMALYTE-A/ISOLYTE-S PH 7 4) IV solution  100 mL/hr Intravenous Continuous    ondansetron (ZOFRAN) injection 4 mg  4 mg Intravenous Q6H PRN    pantoprazole (PROTONIX) EC tablet 20 mg  20 mg Oral Early Morning    temazepam (RESTORIL) capsule 7 5 mg  7 5 mg Oral HS PRN     No Known Allergies    PHYSICAL EXAM:      Objective   Blood pressure 109/59, pulse 79, temperature 98 2 °F (36 8 °C), temperature source Oral, resp  rate 20, SpO2 95 %  There is no height or weight on file to calculate BMI  No intake or output data in the 24 hours ending 01/20/22 0755    General Appearance:   Alert, cooperative, no distress   HEENT:   Normocephalic, atraumatic    Neck:   Supple, symmetrical, trachea midline   Lungs:   Equal chest rise, respirations unlabored    Heart:   Regular rate and rhythm   Abdomen:   Soft, TTP in upper abdomen/epigastrium, non-distended; normal bowel sounds; no masses, no organomegaly    Rectal:   Deferred    Extremities:   No cyanosis, clubbing or edema    Neuro:    Moves all 4 extremities    Skin:   No jaundice, rashes, or lesions      LAB RESULTS:     Admission on 01/19/2022   Component Date Value    WBC 01/19/2022 5 57     RBC 01/19/2022 3 12*    Hemoglobin 01/19/2022 9 7*    Hematocrit 01/19/2022 29 5*    MCV 01/19/2022 95     MCH 01/19/2022 31 1     MCHC 01/19/2022 32 9     RDW 01/19/2022 14 0     MPV 01/19/2022 11 1     Platelets 47/91/5965 162     nRBC 01/19/2022 0     Neutrophils Relative 01/19/2022 83*    Immat GRANS % 01/19/2022 0     Lymphocytes Relative 01/19/2022 7*    Monocytes Relative 01/19/2022 9     Eosinophils Relative 01/19/2022 1     Basophils Relative 01/19/2022 0     Neutrophils Absolute 01/19/2022 4 63     Immature Grans Absolute 01/19/2022 0 02     Lymphocytes Absolute 01/19/2022 0 37*    Monocytes Absolute 01/19/2022 0 51     Eosinophils Absolute 01/19/2022 0 03     Basophils Absolute 01/19/2022 0 01     Sodium 01/19/2022 135*    Potassium 01/19/2022 3 8     Chloride 01/19/2022 101     CO2 01/19/2022 27     ANION GAP 01/19/2022 7     BUN 01/19/2022 15     Creatinine 01/19/2022 1 15     Glucose 01/19/2022 152*    Calcium 01/19/2022 10 1     eGFR 01/19/2022 61     Total Bilirubin 01/19/2022 4 85*    Bilirubin, Direct 01/19/2022 3 77*    Alkaline Phosphatase 01/19/2022 336*    AST 01/19/2022 424*    ALT 01/19/2022 539*    Total Protein 01/19/2022 7 3     Albumin 01/19/2022 3 2*    Protime 01/19/2022 14 5     INR 01/19/2022 1 18     PTT 01/19/2022 29        RADIOLOGY RESULTS: I have personally reviewed pertinent imaging studies  EMILY Juarez  Gastroenterology Fellow  [facility]  Available on [email or web address]

## 2022-01-20 NOTE — ANESTHESIA POSTPROCEDURE EVALUATION
Post-Op Assessment Note    CV Status:  Stable  Pain Score: 0    Pain management: adequate     Mental Status:  Arousable   PONV Controlled:  None   Airway Patency:  Patent      Post Op Vitals Reviewed: Yes      Staff: CRNA         No complications documented      /74 (01/20/22 1555)    Temp (!) 96 8 °F (36 °C) (01/20/22 1555)    Pulse 76 (01/20/22 1555)   Resp 16 (01/20/22 1555)    SpO2 93 % (01/20/22 1555) on RA

## 2022-01-20 NOTE — ED PROVIDER NOTES
History  Chief Complaint   Patient presents with    Abdominal Pain     dx with stage 4 pancreatic CA and blockage in bile duct  told to come in today to be admitted for surgery  74-year-old man recently diagnosed with pancreatic cancer presents with abdominal pain  He was recommended to come here for ERCP and stent placement for his abdominal pain  He has been following with GI outpatient and his CT scan showed pancreatic cancer  He is reporting mild nausea with dark urine  Abdominal pain is a band-like sensation around his epigastrum  He has had poor appetite and unintentional 15 lb weight loss  No recent fevers or chills  Prior to Admission Medications   Prescriptions Last Dose Informant Patient Reported? Taking? Sod Picosulfate-Mag Ox-Cit Acd (Clenpiq) 10-3 5-12 MG-GM -GM/160ML SOLN   No No   Sig: As directed (1 kit)   aspirin (ECOTRIN LOW STRENGTH) 81 mg EC tablet   Yes No   Sig: Take 81 mg by mouth daily   atorvastatin (LIPITOR) 40 mg tablet   Yes No   carvedilol (COREG) 12 5 mg tablet   Yes No   cetirizine (ZyrTEC) 10 mg tablet   Yes No   Sig: Take 10 mg by mouth   lisinopril (ZESTRIL) 10 mg tablet   Yes No   lubiprostone (AMITIZA) 24 mcg capsule   No No   Sig: Take 1 capsule (24 mcg total) by mouth 2 (two) times a day with meals   pantoprazole (PROTONIX) 20 mg tablet   Yes No      Facility-Administered Medications: None       Past Medical History:   Diagnosis Date    CAD (coronary artery disease)     GERD (gastroesophageal reflux disease)     Hyperlipidemia     Hypertension     [facility] [address] [facility]     [facility]        Past Surgical History:   Procedure Laterality Date    CHOLECYSTECTOMY      CORONARY ANGIOPLASTY WITH STENT PLACEMENT         Family History   Problem Relation Age of Onset    Pancreatic cancer Mother     Dementia Father     Esophageal cancer Sister      I have reviewed and agree with the history as documented      E-Cigarette/Vaping    E-Cigarette Use Never User      E-Cigarette/Vaping Substances    Nicotine No     THC No     CBD No     Flavoring No     Other No     Unknown No      Social History     Tobacco Use    Smoking status: Never Smoker    Smokeless tobacco: Not on file   Vaping Use    Vaping Use: Never used   Substance Use Topics    Alcohol use: Not Currently    Drug use: Never        Review of Systems   Constitutional: Negative for chills and fever  HENT: Negative for ear pain and sore throat  Eyes: Negative for pain and visual disturbance  Respiratory: Negative for cough and shortness of breath  Cardiovascular: Negative for chest pain and palpitations  Gastrointestinal: Positive for abdominal pain and nausea  Negative for constipation, diarrhea and vomiting  Genitourinary: Negative for dysuria and hematuria  Musculoskeletal: Negative for arthralgias and back pain  Skin: Negative for color change and rash  Neurological: Negative for seizures, syncope and light-headedness  Psychiatric/Behavioral: Negative for agitation and confusion  All other systems reviewed and are negative  Physical Exam  ED Triage Vitals [01/19/22 2046]   Temperature Pulse Respirations Blood Pressure SpO2   98 9 °F (37 2 °C) 88 22 96/60 96 %      Temp Source Heart Rate Source Patient Position - Orthostatic VS BP Location FiO2 (%)   Tympanic Monitor Sitting Left arm --      Pain Score       5             Orthostatic Vital Signs  Vitals:    01/19/22 2046   BP: 96/60   Pulse: 88   Patient Position - Orthostatic VS: Sitting       Physical Exam  Vitals and nursing note reviewed  Constitutional:       General: He is not in acute distress  Appearance: Normal appearance  He is well-developed  HENT:      Head: Normocephalic and atraumatic  Right Ear: External ear normal       Left Ear: External ear normal    Eyes:      General: No scleral icterus       Conjunctiva/sclera: Conjunctivae normal       Pupils: Pupils are equal, round, and reactive to light  Cardiovascular:      Rate and Rhythm: Normal rate and regular rhythm  Heart sounds: No murmur heard  Pulmonary:      Effort: Pulmonary effort is normal  No respiratory distress  Breath sounds: Normal breath sounds  Abdominal:      Palpations: Abdomen is soft  Tenderness: There is generalized abdominal tenderness  There is no guarding or rebound  Musculoskeletal:         General: No swelling or tenderness  Normal range of motion  Cervical back: Normal range of motion and neck supple  Skin:     General: Skin is warm and dry  Neurological:      General: No focal deficit present  Mental Status: He is alert and oriented to person, place, and time     Psychiatric:         Mood and Affect: Mood normal          Behavior: Behavior normal          ED Medications  Medications   lactated ringers bolus 1,000 mL (has no administration in time range)   morphine (PF) 10 mg/mL injection 6 mg (has no administration in time range)   ondansetron (ZOFRAN) injection 4 mg (has no administration in time range)   carvedilol (COREG) tablet 12 5 mg (has no administration in time range)   lisinopril (ZESTRIL) tablet 10 mg (has no administration in time range)   lubiprostone (AMITIZA) capsule 24 mcg (has no administration in time range)   pantoprazole (PROTONIX) EC tablet 20 mg (has no administration in time range)   loratadine (CLARITIN) tablet 10 mg (has no administration in time range)       Diagnostic Studies  Results Reviewed     Procedure Component Value Units Date/Time    CBC and differential [[id number]]     Lab Status: No result Specimen: Blood     Basic metabolic panel [[id number]]     Lab Status: No result Specimen: Blood     Hepatic function panel [[id number]]     Lab Status: No result Specimen: Blood                  No orders to display         Procedures  Procedures      ED Course                                       MDM  Number of Diagnoses or Management Options  Obstructive jaundice  Pancreatic mass  Diagnosis management comments: Will admit to [facility] for GI consult and possible ERCP/stent tomorrow  Will give fluids and pain medicines  Patient in agreement with plan and questions were answered  Portions or all of this note were generated using voice recognition software  Occasional wrong word or "sound a like" substitutions may have occurred due to the inherent limitations of voice recognition software  Please interpret any errors within the intended context of the whole sentence or idea  Disposition  Final diagnoses:   Pancreatic mass   Obstructive jaundice     Time reflects when diagnosis was documented in both MDM as applicable and the Disposition within this note     Time User Action Codes Description Comment    1/19/2022  9:56 PM Delmus Lefort Add [P00 27] Pancreatic mass     1/19/2022  9:56 PM Delmus Lefort Add [K83 1] Obstructive jaundice       ED Disposition     ED Disposition Condition Date/Time Comment    Admit Stable Wed Jan 19, 2022 10:00 PM Case was discussed with RUDDY and the patient's admission status was agreed to be Admission Status: observation status to the service of Dr Joyce Fairchild  Follow-up Information    None         Patient's Medications   Discharge Prescriptions    No medications on file     No discharge procedures on file  PDMP Review     None           ED Provider  Attending physically available and evaluated Reji Kanpallavi DE LEON managed the patient along with the ED Attending      Electronically Signed by         Uzma Orlando MD  01/19/22 5159

## 2022-01-20 NOTE — PLAN OF CARE
Problem: SAFETY ADULT  Goal: Patient will remain free of falls  Description: INTERVENTIONS:  - Educate patient/family on patient safety including physical limitations  - Instruct patient to call for assistance with activity   - Consult OT/PT to assist with strengthening/mobility   - Keep Call bell within reach  - Keep bed low and locked with side rails adjusted as appropriate  - Keep care items and personal belongings within reach  - Initiate and maintain comfort rounds  - Make Fall Risk Sign visible to staff  - Offer Toileting every  Hours, in advance of need  - Initiate/Maintain alarm  - Obtain necessary fall risk management equipment:   - Apply yellow socks and bracelet for high fall risk patients  - Consider moving patient to room near nurses station  Outcome: Progressing

## 2022-01-20 NOTE — H&P
[facility]  H&P- Madisyn Ireland 1945, 67 y o  male MRN: [id number]  Unit/Bed#: ED 02 Encounter: [id number]  Primary Care Provider: Viva Frankel, DO   Date and time admitted to hospital: 1/19/2022  9:12 PM    * 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 [facility])  Assessment & Plan  Patient was sent here from Gastroenterology office for stent placement/ERCP  NPO post midnight  On intravenous fluid hydration  Metabolic profile with CBC/differential, PT INR in the morning  For pain; patient was given morphine 6 mg will transition to Dilaudid 0 2 mg every 3 hours as needed  With nausea, patient is on Zofran  Pancreatic mass  Assessment & Plan  Currently, the patient is being worked up with ERCP by GI however further care is still being decided upon  A as per notes, the patient may probably going to [facility]  Primary hypertension  Assessment & Plan  On lisinopril 10 mg daily  Pure hypercholesterolemia  Assessment & Plan  On Lipitor 40 mg daily however put on hold due to elevation of liver enzymes  Coronary artery disease involving native coronary artery of native heart without angina pectoris  Assessment & Plan  Continue Coreg 12 5 mg twice daily  VTE Prophylaxis: Enoxaparin (Lovenox)  / sequential compression device   Code Status: Level 1 - Full Code as discussed with patient in front of daughter  POLST: There is no POLST form on file for this patient (pre-hospital)    Anticipated Length of Stay:  Patient will be admitted on an Observation basis with an anticipated length of stay of  less than 2 midnights  Justification for Hospital Stay: Please see detailed plans noted above  Chief Complaint:     Weight loss with obstructive jaundice; newly diagnosed pancreatic mass  History of Present Illness:   Madisyn Ireland is a 67 y o  male who has past medical history significant for primary hypertension, gastroesophageal reflux disease, prostate cancer, hyperlipidemia and hypertension  Patient also has a history of CAD  Recently, the patient has been seeing Gastroenterology due to 2 month history of abdominal discomfort with change in bowel habits constipation  Likewise, over the past month has lost 15 lb  CT scan of the abdomen reveals head of the pancreas mass measured at 6 4 x 5 7 cm with impingement of the duodenum with ductal dilation  Meanwhile, the patient was sent to [facility] for further workup and treatment of obstructive jaundice as the patient would need ERCP, stenting and possibly endoscopic ultrasound with biopsy of mass  At this point, it seems that definitive care of patient's newly diagnosed pancreatic mass is still being determined whether to be done at [facility] or [facility] oncology) or [facility]  Currently, patient is complaining of nausea otherwise much better with Zofran  As for the pain, he was given some morphine with relief  Review of Systems:    Constitutional:  Denies fever or chills   Eyes:  Denies change in visual acuity   HENT:  Denies nasal congestion or sore throat   Respiratory:  Denies cough or shortness of breath   Cardiovascular:  Denies chest pain or edema   GI:  Upper abdominal discomfort much better  With nausea, no vomiting  Poor p o  intake    :  Denies dysuria   Musculoskeletal:  Denies back pain or joint pain   Integument:  Denies rash   Neurologic:  Denies headache, focal weakness or sensory changes   Endocrine:  Denies polyuria or polydipsia   Lymphatic:  Denies swollen glands   Psychiatric:  Denies depression or anxiety     Past Medical and Surgical History:   Past Medical History:   Diagnosis Date    CAD (coronary artery disease)     GERD (gastroesophageal reflux disease)     Hyperlipidemia     Hypertension     [facility] [address] [facility]     [facility]      Past Surgical History:   Procedure Laterality Date    CHOLECYSTECTOMY      CORONARY ANGIOPLASTY WITH STENT PLACEMENT Meds/Allergies:  (Not in a hospital admission)      Allergies: No Known Allergies  History:  Marital Status: /Civil Union   Occupation:  He is retired  Patient Pre-hospital Living Situation:  Lives at home  Patient Pre-hospital Level of Mobility:  Ambulatory  Patient Pre-hospital Diet Restrictions:  Regular  Substance Use History:   Social History     Substance and Sexual Activity   Alcohol Use Not Currently     Social History     Tobacco Use   Smoking Status Never Smoker   Smokeless Tobacco Not on file     Social History     Substance and Sexual Activity   Drug Use Never       Family History:  Family History   Problem Relation Age of Onset    Pancreatic cancer Mother     Dementia Father     Esophageal cancer Sister        Physical Exam:     Vitals:   Blood Pressure: 96/60 (01/19/22 2046)  Pulse: 88 (01/19/22 2046)  Temperature: 98 9 °F (37 2 °C) (01/19/22 2046)  Temp Source: Tympanic (01/19/22 2046)  Respirations: 22 (01/19/22 2046)  SpO2: 96 % (01/19/22 2046)    Constitutional:  Well developed, well nourished, no acute distress, non-toxic appearance sallow skin  Eyes:  PERRL, conjunctiva normal with jaundice  HENT:  Atraumatic, external ears normal, nose normal, oropharynx moist, no pharyngeal exudates  Neck- normal range of motion, no tenderness, supple   Respiratory:  No respiratory distress, normal breath sounds, no rales, no wheezing   Cardiovascular:  Normal rate, normal rhythm, no murmurs, no gallops, no rubs   GI:  Soft, nondistended, normal bowel sounds, nontender, no organomegaly, no mass, no rebound, no guarding   :  No costovertebral angle tenderness   Musculoskeletal:  No edema, no tenderness, no deformities   Back- no tenderness  Integument:  Well hydrated, no rash   Lymphatic:  No lymphadenopathy noted   Neurologic:  Alert &awake, communicative, CN 2-12 normal, normal motor function, normal sensory function, no focal deficits noted   Psychiatric:  Speech and behavior appropriate Lab Results: I have personally reviewed pertinent reports  Results from last 7 days   Lab Units 01/19/22  1029   WBC Thousand/uL 4 92   HEMOGLOBIN g/dL 10 2*   HEMATOCRIT % 31 7*   PLATELETS Thousands/uL 168   NEUTROS PCT % 80*   LYMPHS PCT % 9*   MONOS PCT % 10   EOS PCT % 1     Results from last 7 days   Lab Units 01/19/22  1029   POTASSIUM mmol/L 4 1   CHLORIDE mmol/L 102   CO2 mmol/L 29   BUN mg/dL 14   CREATININE mg/dL 1 26   CALCIUM mg/dL 8 9   ALK PHOS U/L 330*   ALT U/L 578*   AST U/L 450*       Imaging: I have personally reviewed pertinent reports  CT abdomen pelvis w contrast    Result Date: 1/19/2022  Narrative: CT ABDOMEN AND PELVIS WITH IV CONTRAST INDICATION:   R10 10: Upper abdominal pain, unspecified C61: Malignant neoplasm of prostate R82 998: Other abnormal findings in urine R63 4: Abnormal weight loss R53 83: Other fatigue R19 4: Change in bowel habit  COMPARISON:  None  TECHNIQUE:  CT examination of the abdomen and pelvis was performed  Axial, sagittal, and coronal 2D reformatted images were created from the source data and submitted for interpretation  Radiation dose length product (DLP) for this visit:  1215 76 mGy-cm   This examination, like all CT scans performed in the [facility], was performed utilizing techniques to minimize radiation dose exposure, including the use of iterative reconstruction and automated exposure control  IV Contrast:  100 mL of iohexol (OMNIPAQUE) Enteric Contrast:  Enteric contrast was administered  FINDINGS: ABDOMEN LOWER CHEST:  No clinically significant abnormality identified in the visualized lower chest  LIVER/BILIARY TREE: There are multiple hypoattenuating rounded masses scattered throughout the liver measuring up to 2 2 cm in the left hepatic lobe worrisome for metastases  Moderate intra and extrahepatic biliary ductal dilatation is seen  The common  bile duct is dilated measuring up to 1 5 cm  GALLBLADDER:  Absent   SPLEEN: Unremarkable  PANCREAS:  There is a large 6 4 x 5 7 x 4 9 cm obstructing pancreatic head mass with low-attenuation center suggestive of necrosis  This mass is inseparable from the duodenum and abuts the main portal vein  Mild atrophy is seen of the pancreatic body and tail  ADRENAL GLANDS:  Unremarkable  KIDNEYS/URETERS:  Bilateral renal cysts measuring up to 12 4 cm on the left  No hydronephrosis  STOMACH AND BOWEL:  No evidence for bowel obstruction  APPENDIX:  No findings to suggest appendicitis  ABDOMINOPELVIC CAVITY:  No ascites  No pneumoperitoneum  Retroperitoneal lymphadenopathy is seen  For example, a left para-aortic lymph node measures 2 9 x 2 0 cm  There is right retrocrural lymphadenopathy with the largest node measuring 2 6 x 1 3 cm  Periportal lymphadenopathy measures up to 1 8 cm  VESSELS: The left renal vein is significantly compressed secondary to the pancreatic mass  Atherosclerotic disease  No abdominal aortic aneurysm  PELVIS REPRODUCTIVE ORGANS:  Prostate radiation treatment markers  URINARY BLADDER:  Unremarkable  ABDOMINAL WALL/INGUINAL REGIONS:  Tiny fat-containing umbilical hernia  OSSEOUS STRUCTURES:  No acute fracture  Mild diffusely mottled appearance of the pelvic osseous structures may be sequela of prior prostate radiation treatment therapy although metastatic disease cannot be excluded  Impression: 6 4 x 5 7 x 4 9 cm obstructing pancreatic head mass with internal necrosis highly worrisome for primary pancreatic neoplasm  Multiple liver masses and retroperitoneal lymphadenopathy suggestive of metastatic disease  Moderate intra- and extrahepatic biliary ductal dilatation secondary to obstruction  Mild diffusely mottled appearance of the pelvic osseous structures may be sequela of prior prostate radiation treatment therapy although metastatic disease cannot be excluded  The study was marked in [facility] for immediate notification   Workstation performed: [id number]         ** Please Note: Dragon 360 Dictation voice to text software was used in the creation of this document   **

## 2022-01-20 NOTE — ANESTHESIA PREPROCEDURE EVALUATION
Procedure:  ENDOSCOPIC ULTRASOUND (UPPER)  ERCP    Relevant Problems   ANESTHESIA (within normal limits)   (-) History of anesthesia complications      CARDIO   (+) Coronary artery disease involving native coronary artery of native heart without angina pectoris   (+) History of PTCA (2017)   (+) Primary hypertension   (+) Pure hypercholesterolemia      ENDO (within normal limits)      GI/HEPATIC   (+) GERD (gastroesophageal reflux disease)      /RENAL   (+) Prostate cancer (HCC)   (+) Stage 3 chronic kidney disease (HCC)      HEMATOLOGY   (+) Anemia      MUSCULOSKELETAL (within normal limits)      NEURO/PSYCH (within normal limits)      PULMONARY (within normal limits)      Other   (+) Ischemic cardiomyopathy   (+) 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 (HCC)   (+) Pancreatic mass        Physical Exam    Airway    Mallampati score: II  TM Distance: >3 FB  Neck ROM: full     Dental   No notable dental hx     Cardiovascular  Rhythm: regular, Rate: normal, Cardiovascular exam normal    Pulmonary  Pulmonary exam normal Breath sounds clear to auscultation,     Other Findings        Anesthesia Plan  ASA Score- 3     Anesthesia Type- general with ASA Monitors  Additional Monitors:   Airway Plan: ETT  Plan Factors-    Chart reviewed  EKG reviewed  Imaging results reviewed  Existing labs reviewed  Patient is not a current smoker  Patient did not smoke on day of surgery  Induction- intravenous  Postoperative Plan-   Planned trial extubation    Informed Consent- Anesthetic plan and risks discussed with patient  I personally reviewed this patient with the CRNA  Discussed and agreed on the Anesthesia Plan with the CRNA  Baldomero Pardo Echo (11/24/21):    Left ventricle was not well visualized  Left ventricle is normal in size  Systolic function is mildly decreased with an ejection fraction of 40-50%  Definity done at high MI needs to be repeated  Poor resolution of wall motion  Global hypokinesis  There is normal diastolic function    Right ventricle was not well visualized  Right ventricle cavity is normal  Systolic function is low normal      Left atrium cavity is mildly dilated    Mitral ValveThe leaflets are mildly thickened and exhibit mildly reduced excursion  There is trace regurgitation  There is no evidence of mitral valve stenosis  NPO verified  NKDA  Patient received carvedilol and PO Protonix this AM, 1/20/22  Plan:  GETA    Risks and benefits of general anesthesia were discussed with the patient  Discussed risks of anesthesia including, but not limited to, the risk of dental injury, n/v, sore throat, corneal abrasions, and arrhythmias  Less common risks including MI, stroke, and death were also discussed  All questions were answered  Anesthesia consent was obtained from the patient

## 2022-01-20 NOTE — UTILIZATION REVIEW
Initial Clinical Review  Observation on 01/19 @ 2200 upgraded to Inpatient on 01/21 @ [phone] for continued treatment of obstructive jaundice d/t cancer, pancreatic mass, on pain control management and advancing diet  Admission: Date/Time/Statement:   Admission Orders (From admission, onward)     Ordered        01/21/22 0955  Inpatient Admission  Once            01/19/22 2200  Place in Observation  Once                      Orders Placed This Encounter   Procedures    Inpatient Admission     Standing Status:   Standing     Number of Occurrences:   1     Order Specific Question:   Level of Care     Answer:   Med Surg [16]     Order Specific Question:   Estimated length of stay     Answer:   More than 2 Midnights     Order Specific Question:   Certification     Answer:   I certify that inpatient services are medically necessary for this patient for a duration of greater than two midnights  See H&P and MD Progress Notes for additional information about the patient's course of treatment  ED Arrival Information     Expected Arrival Acuity    - 1/19/2022 20:35 Emergent         Means of arrival Escorted by Service Admission type    [facility] Emergency         Arrival complaint    abdominal pain         Chief Complaint   Patient presents with    Abdominal Pain     dx with stage 4 pancreatic CA and blockage in bile duct  told to come in today to be admitted for surgery  Initial Presentation: 67 y o  male from OP GI office recently diagnosed with pancreatic cancer presented to the ED with abdominal pain  Pt reports abdominal pain is a band-like sensation around his epigastrium associated with mild nausea and dark urine  He also reports poor appetite and unintentional 15 lb weight loss   Pt has been following OP GI and his CT scan  of the abdomen revealed head of the pancreas mass measured at 6 4 x 5 7 cm with impingement of the duodenum with ductal dilation, recommended to come to ED for ERCP and stent placement  No recent fevers or chills  In ED, pt was c/o of nausea and abdominal pain, Zofran and morphine given with improvement  Admit as observation level of care for obstructive jaundice d/t cancer, pancreatic mass: NPO post MN, IVF, Metabolic profile with CBC/differential, PT INR in the morning  PRN and nausea control    01/20  GI Consult: Pancreatic head mass, obstructive jaundice, BRCA mutation: Plan for EUS for tissue sampling and ERCP for biliary stenting and decompression if able   NPO for procedure   Further recommendations to follow pending endoscopic findings   Check CA 19-9   Serial abdominal exams  Follow LFT's, Pain control  Medical Oncology Consult: Imaging is suspicious for pancreatic cancer, locally advanced  ERCP result pending  If diagnosis is confirmed chemotherapy would be applicable  Molecular studies to be carried out when material is available  01/21  GI Notes: Status post EUS and ERCP on 01/20  Biopsies are pending  LFTs are improved  Continue to follow  Pain control with palliative Care  Advance diet to surgical soft as tolerated  If he is unable to eat then may consider doing a duodenal stent versus gastrojejunostomy  Consider referral to genetic counselor for familial testing inappropriate screenings for family members given family history of BRCA gene mutation    Internal Medicine Notes: Pt tolerated clear liquid diet  Still complainin, pain contrg of pain  Oxycodone added  Palliative care consulted  Pathology still pending  Restart statin when LFTs Improved  PE: Scleral icterus present  Abdomen is flat, no distension/tenderness/mass       Date 01/22 Day 2:    ED Triage Vitals [01/19/22 2046]   Temperature Pulse Respirations Blood Pressure SpO2   98 9 °F (37 2 °C) 88 22 96/60 96 %      Temp Source Heart Rate Source Patient Position - Orthostatic VS BP Location FiO2 (%)   Tympanic Monitor Sitting Left arm --      Pain Score       5          Wt Readings from Last 1 Encounters:   01/19/22 88 9 kg (196 lb)     Additional Vital Signs:   Date/Time Temp Pulse Resp BP MAP (mmHg) SpO2 O2 Device Patient Position - Orthostatic VS   01/21/22 1626 99 4 °F (37 4 °C) 72 14 130/78 94 93 % None (Room air) Lying   01/21/22 0810 -- -- -- -- -- -- None (Room air) --   01/21/22 0720 99 9 °F (37 7 °C) 74 17 133/67 94 92 % None (Room air) Lying   01/20/22 2300 98 1 °F (36 7 °C) 73 18 108/55 77 96 % None (Room air) Lying   01/20/22 1730 98 °F (36 7 °C) 71 17 144/67 97 -- -- Lying   01/20/22 1621 -- 76 18 116/56 -- 93 % None (Room air) --   01/20/22 1605 -- 86 18 132/60 -- 96 % None (Room air) --   01/20/22 1600 -- 69 18 130/74 -- 93 % None (Room air) --   01/20/22 1555 96 8 °F (36 °C) Abnormal  76 16 130/74 -- 93 % None (Room air) --   01/20/22 1038 97 7 °F (36 5 °C) 72 16 112/60 -- 97 % None (Room air) --   01/20/22 0830 -- -- -- -- -- -- None (Room air) --       Date/Time Temp Pulse Resp BP MAP (mmHg) SpO2 O2 Device Patient Position - Orthostatic VS   01/20/22 0800 98 4 °F (36 9 °C) 71 -- 124/66 88 95 % None (Room air) Lying   01/19/22 2300 98 2 °F (36 8 °C) 79 20 109/59 77 95 % None (Room air) Lying   01/19/22 2249 -- 76 22 96/50 -- 94 % -- --       Pertinent Labs/Diagnostic Test Results:   01/19 CT abdomen pelvis:   6 4 x 5 7 x 4 9 cm obstructing pancreatic head mass with internal necrosis highly worrisome for primary pancreatic neoplasm  Multiple liver masses and retroperitoneal lymphadenopathy suggestive of metastatic disease  Moderate intra- and extrahepatic   biliary ductal dilatation secondary to obstruction      Mild diffusely mottled appearance of the pelvic osseous structures may be sequela of prior prostate radiation treatment therapy although metastatic disease cannot be excluded          Results from last 7 days   Lab Units 01/19/22 2239 01/19/22  1029   WBC Thousand/uL 5 57 4 92   HEMOGLOBIN g/dL 9 7* 10 2*   HEMATOCRIT % 29 5* 31 7*   PLATELETS Thousands/uL 162 168   NEUTROS ABS Thousands/µL 4 63 3 91         Results from last 7 days   Lab Units 01/21/22  0454 01/19/22  2239 01/19/22  1029   SODIUM mmol/L 138 135* 136   POTASSIUM mmol/L 3 7 3 8 4 1   CHLORIDE mmol/L 104 101 102   CO2 mmol/L 28 27 29   ANION GAP mmol/L 6 7 5   BUN mg/dL 12 15 14   CREATININE mg/dL 0 98 1 15 1 26   EGFR ml/min/1 73sq m 74 61 54   CALCIUM mg/dL 8 8 10 1 8 9     Results from last 7 days   Lab Units 01/21/22  0454 01/19/22 2239 01/19/22  1029   AST U/L 302* 424* 450*   ALT U/L 446* 539* 578*   ALK PHOS U/L 326* 336* 330*   TOTAL PROTEIN g/dL 6 7 7 3 7 3   ALBUMIN g/dL 2 9* 3 2* 3 1*   TOTAL BILIRUBIN mg/dL 1 77* 4 85* 4 40*   BILIRUBIN DIRECT mg/dL 1 02* 3 77*  --          Results from last 7 days   Lab Units 01/21/22 0454 01/19/22 2239   GLUCOSE RANDOM mg/dL 83 152*       Results from last 7 days   Lab Units 01/19/22  2240   PROTIME seconds 14 5   INR  1 18   PTT seconds 29       Results from last 7 days   Lab Units 01/19/22  1029   CLARITY UA  Clear   COLOR UA  Brown   SPEC GRAV UA  1 020   PH UA  6 0   GLUCOSE UA mg/dl Negative   KETONES UA mg/dl Negative   BLOOD UA  Negative   PROTEIN UA mg/dl Trace*   NITRITE UA  Negative   BILIRUBIN UA  Interference- unable to analyze*   UROBILINOGEN UA E U /dl >=8 0*   LEUKOCYTES UA  Negative   WBC UA /hpf 2-4   RBC UA /hpf 0-1*   BACTERIA UA /hpf Occasional   EPITHELIAL CELLS WET PREP /hpf None Seen   MUCUS THREADS  Occasional*     ED Treatment:   Medication Administration from 01/19/2022 2035 to 01/19/2022 2339       Date/Time Order Dose Route Action     01/19/2022 2248 lactated ringers bolus 1,000 mL 1,000 mL Intravenous New Bag     01/19/2022 2242 morphine (PF) 10 mg/mL injection 6 mg 6 mg Intravenous Given     01/19/2022 2241 ondansetron (ZOFRAN) injection 4 mg 4 mg Intravenous Given     01/19/2022 2225 atorvastatin (LIPITOR) tablet 40 mg   Oral Canceled Entry        Past Medical History:   Diagnosis Date    CAD (coronary artery disease)     GERD (gastroesophageal reflux disease)     Hyperlipidemia     Hypertension     [facility] [address] [facility]     [facility]      Present on Admission:   Pancreatic mass   Coronary artery disease involving native coronary artery of native heart without angina pectoris      Admitting Diagnosis: Abdominal pain [R10 9]  Pancreatic mass [K86 89]  Obstructive jaundice [K83 1]  Age/Sex: 67 y o  male  Admission Orders:  SCD  Daily weight  I/O  Surgical soft diet  Scheduled Medications:  carvedilol, 12 5 mg, Oral, BID With Meals  enoxaparin, 40 mg, Subcutaneous, Daily  lisinopril, 10 mg, Oral, HS  loratadine, 10 mg, Oral, Daily  lubiprostone, 24 mcg, Oral, BID With Meals  pantoprazole, 20 mg, Oral, Early Morning      Continuous IV Infusions:  multi-electrolyte, 100 mL/hr, Intravenous, Continuous      PRN Meds:  aluminum-magnesium hydroxide-simethicone, 30 mL, Oral, Q6H PRN  docusate sodium, 100 mg, Oral, BID PRN  HYDROmorphone, 0 2 mg, Intravenous, Q3H PRN 01/20 x 4, 01/21 x 2  ondansetron, 4 mg, Intravenous, Q6H PRN 01/20 x 1, 01/21 x 2  oxyCODONE, 2 5 mg, Oral, Q4H PRN  oxyCODONE, 5 mg, Oral, Q4H PRN 01/21 x 1  senna, 1 tablet, Oral, HS PRN  temazepam, 7 5 mg, Oral, HS PRN        IP CONSULT TO GASTROENTEROLOGY  IP CONSULT TO CASE MANAGEMENT  IP CONSULT TO ONCOLOGY  IP CONSULT TO PALLIATIVE CARE    Network Utilization Review Department  ATTENTION: Please call with any questions or concerns to [phone] and carefully listen to the prompts so that you are directed to the right person  All voicemails are confidential   Lurdes Carter all requests for admission clinical reviews, approved or denied determinations and any other requests to dedicated fax number below belonging to the campus where the patient is receiving treatment   List of dedicated fax numbers for the Facilities:  FACILITY NAME UR FAX NUMBER   ADMISSION DENIALS (Administrative/Medical Necessity) [phone]   [address] (Maternity/NICU/Pediatrics) 261 [facility],[address] [address] [place] [address]  [phone]   Debbie Sharp [facility] [address] [id number] [place] [address] Darlene [address] [facility] [place] [address] [phone]

## 2022-01-20 NOTE — PLAN OF CARE
Problem: PAIN - ADULT  Goal: Verbalizes/displays adequate comfort level or baseline comfort level  Description: Interventions:  - Encourage patient to monitor pain and request assistance  - Assess pain using appropriate pain scale  - Administer analgesics based on type and severity of pain and evaluate response  - Implement non-pharmacological measures as appropriate and evaluate response  - Consider cultural and social influences on pain and pain management  - Notify physician/advanced practitioner if interventions unsuccessful or patient reports new pain  Outcome: Progressing     Problem: INFECTION - ADULT  Goal: Absence or prevention of progression during hospitalization  Description: INTERVENTIONS:  - Assess and monitor for signs and symptoms of infection  - Monitor lab/diagnostic results  - Monitor all insertion sites, i e  indwelling lines, tubes, and drains  - Monitor endotracheal if appropriate and nasal secretions for changes in amount and color  - [place] appropriate cooling/warming therapies per order  - Administer medications as ordered  - Instruct and encourage patient and family to use good hand hygiene technique  - Identify and instruct in appropriate isolation precautions for identified infection/condition  Outcome: Progressing  Goal: Absence of fever/infection during neutropenic period  Description: INTERVENTIONS:  - Monitor WBC    Outcome: Progressing     Problem: SAFETY ADULT  Goal: Patient will remain free of falls  Description: INTERVENTIONS:  - Educate patient/family on patient safety including physical limitations  - Instruct patient to call for assistance with activity   - Consult OT/PT to assist with strengthening/mobility   - Keep Call bell within reach  - Keep bed low and locked with side rails adjusted as appropriate  - Keep care items and personal belongings within reach  - Initiate and maintain comfort rounds  - Make Fall Risk Sign visible to staff  - Offer Toileting every 2 Hours, in advance of need  - Initiate/Maintain no alarm  - Obtain necessary fall risk management equipment: none needed  - Apply yellow socks and bracelet for high fall risk patients  - Consider moving patient to room near nurses station  Outcome: Progressing  Goal: Maintain or return to baseline ADL function  Description: INTERVENTIONS:  -  Assess patient's ability to carry out ADLs; assess patient's baseline for ADL function and identify physical deficits which impact ability to perform ADLs (bathing, care of mouth/teeth, toileting, grooming, dressing, etc )  - Assess/evaluate cause of self-care deficits   - Assess range of motion  - Assess patient's mobility; develop plan if impaired  - Assess patient's need for assistive devices and provide as appropriate  - Encourage maximum independence but intervene and supervise when necessary  - Involve family in performance of ADLs  - Assess for home care needs following discharge   - Consider OT consult to assist with ADL evaluation and planning for discharge  - Provide patient education as appropriate  Outcome: Progressing  Goal: Maintains/Returns to pre admission functional level  Description: INTERVENTIONS:  - Perform BMAT or MOVE assessment daily    - Set and communicate daily mobility goal to care team and patient/family/caregiver  - Collaborate with rehabilitation services on mobility goals if consulted  - Perform Range of Motion 3 times a day  - Reposition patient every 2 hours    - Dangle patient 3 times a day  - Stand patient 3 times a day  - Ambulate patient 3 times a day  - Out of bed to chair 3 times a day   - Out of bed for meals 3 times a day  - Out of bed for toileting  - Record patient progress and toleration of activity level   Outcome: Progressing     Problem: DISCHARGE PLANNING  Goal: Discharge to home or other facility with appropriate resources  Description: INTERVENTIONS:  - Identify barriers to discharge w/patient and caregiver  - Arrange for needed discharge resources and transportation as appropriate  - Identify discharge learning needs (meds, wound care, etc )  - Arrange for interpretive services to assist at discharge as needed  - Refer to Case Management Department for coordinating discharge planning if the patient needs post-hospital services based on physician/advanced practitioner order or complex needs related to functional status, cognitive ability, or social support system  Outcome: Progressing     Problem: Knowledge Deficit  Goal: Patient/family/caregiver demonstrates understanding of disease process, treatment plan, medications, and discharge instructions  Description: Complete learning assessment and assess knowledge base  Interventions:  - Provide teaching at level of understanding  - Provide teaching via preferred learning methods  Outcome: Progressing     Problem: Nutrition/Hydration-ADULT  Goal: Nutrient/Hydration intake appropriate for improving, restoring or maintaining nutritional needs  Description: Monitor and assess patient's nutrition/hydration status for malnutrition  Collaborate with interdisciplinary team and initiate plan and interventions as ordered  Monitor patient's weight and dietary intake as ordered or per policy  Utilize nutrition screening tool and intervene as necessary  Determine patient's food preferences and provide high-protein, high-caloric foods as appropriate       INTERVENTIONS:  - Monitor oral intake, urinary output, labs, and treatment plans  - Assess nutrition and hydration status and recommend course of action  - Evaluate amount of meals eaten  - Assist patient with eating if necessary   - Allow adequate time for meals  - Recommend/ encourage appropriate diets, oral nutritional supplements, and vitamin/mineral supplements  - Order, calculate, and assess calorie counts as needed  - Recommend, monitor, and adjust tube feedings and TPN/PPN based on assessed needs  - Assess need for intravenous fluids  - Provide specific nutrition/hydration education as appropriate  - Include patient/family/caregiver in decisions related to nutrition  Outcome: Progressing

## 2022-01-20 NOTE — QUICK NOTE
Pt has been in GI lab for several hours, he was not physically examined by myself  D/w GI s/p stent placement  Monitor overnight  Heme/onc consult  Chart reviewed

## 2022-01-20 NOTE — ASSESSMENT & PLAN NOTE
Patient was sent here from Gastroenterology office for stent placement/ERCP  NPO post midnight  On intravenous fluid hydration  Metabolic profile with CBC/differential, PT INR in the morning  For pain; patient was given morphine 6 mg will transition to Dilaudid 0 2 mg every 3 hours as needed  With nausea, patient is on Zofran

## 2022-01-20 NOTE — CONSULTS
Medical Oncology/Hematology Consult Note  Mariana Cobos, male, 67 y o , 1945,  [facility], [id number]     Assessment/Plan:    Assessment in summary, this is a 77-year-old male history of recently detected hepatic mass with regional lymphadenopathy  Imaging is suspicious for pancreatic cancer, locally advanced  ERCP result pending  If diagnosis is confirmed chemotherapy would be applicable  Molecular studies to be carried out when material is available  Plan see above  History of Present Illness     HPI: Mariana Cobos is a 67y o  year old male who presents with Increasing abdominal discomfort and change in bowel habits, constipation over 2 months  15 lb weight loss over 1 month  CT of the abdomen pelvis shows 6 4 cm pancreatic head mass with obstruction and necrosis  Retrocrural adenopathy up to 2 6 cm, left para-aortic node, 2 9 cm, periportal adenopathy 1 8 cm  ERCP done today  WBC 5 5, hemoglobin 9 7, MCV 95, platelet count 007, normal differential     Review of Systems   Constitutional: Positive for unexpected weight change  Negative for chills and fever  HENT: Negative for nosebleeds  Eyes: Negative for discharge  Respiratory: Negative for cough and shortness of breath  Cardiovascular: Negative for chest pain  Gastrointestinal: Positive for abdominal pain and constipation  Negative for diarrhea  Endocrine: Negative for polydipsia  Genitourinary: Negative for hematuria  Musculoskeletal: Negative for arthralgias  Skin: Negative for color change  Allergic/Immunologic: Negative for immunocompromised state  Neurological: Negative for dizziness and headaches  Hematological: Negative for adenopathy  Psychiatric/Behavioral: Negative for agitation         Historical Information   Past Medical History:   Diagnosis Date    CAD (coronary artery disease)     GERD (gastroesophageal reflux disease)     Hyperlipidemia     Hypertension     Pancreatic cancer ([facility] [address] )  Prostate cancer [facility])      Past Surgical History:   Procedure Laterality Date    CHOLECYSTECTOMY      CORONARY ANGIOPLASTY WITH STENT PLACEMENT       Social History   Social History     Substance and Sexual Activity   Alcohol Use Not Currently     Social History     Substance and Sexual Activity   Drug Use Never     Social History     Tobacco Use   Smoking Status Never Smoker   Smokeless Tobacco Not on file     Family History:   Family History   Problem Relation Age of Onset    Pancreatic cancer Mother     Dementia Father     Esophageal cancer Sister        Meds/Allergies   all current active meds have been reviewed  No Known Allergies    Objective   Vitals: Blood pressure 116/56, pulse 76, temperature (!) 96 8 °F (36 °C), temperature source Tympanic, resp  rate 18, height 5' 8" (1 727 m), SpO2 93 %  Intake/Output Summary (Last 24 hours) at 1/20/2022 1631  Last data filed at 1/20/2022 1548  Gross per 24 hour   Intake 850 ml   Output --   Net 850 ml     Invasive Devices  Report    Peripheral Intravenous Line            Peripheral IV 01/19/22 Right Forearm <1 day                Physical Exam  Constitutional:       Appearance: He is well-developed  HENT:      Head: Normocephalic and atraumatic  Eyes:      Pupils: Pupils are equal, round, and reactive to light  Cardiovascular:      Rate and Rhythm: Normal rate and regular rhythm  Heart sounds: No murmur heard  Pulmonary:      Breath sounds: Normal breath sounds  No wheezing or rales  Abdominal:      Palpations: Abdomen is soft  Tenderness: There is no abdominal tenderness  Musculoskeletal:         General: No tenderness  Normal range of motion  Cervical back: Neck supple  Lymphadenopathy:      Cervical: No cervical adenopathy  Skin:     Findings: No erythema or rash  Neurological:      Mental Status: He is alert and oriented to person, place, and time  Cranial Nerves: No cranial nerve deficit        Deep Tendon Reflexes: Reflexes are normal and symmetric  Psychiatric:         Behavior: Behavior normal          Lab Results: I have personally reviewed pertinent reports  Imaging Studies: I have personally reviewed pertinent reports  EKG, Pathology, and Other Studies: I have personally reviewed pertinent reports  Counseling / Coordination of Care  Total floor / unit time spent today 40 minutes  Greater than 50% of total time was spent with the patient and / or family counseling and / or coordination of care  A description of the counseling / coordination of care: see note

## 2022-01-20 NOTE — ASSESSMENT & PLAN NOTE
Currently, the patient is being worked up with ERCP by GI however further care is still being decided upon  A as per notes, the patient may probably going to [facility]

## 2022-01-20 NOTE — ED TRIAGE NOTES
Per pt "today I was dx with stage 4 pancreatic cancer  But there is a blockage in the bile duct   My GI said that if the pain got worse to come to the ER to have surgery to relieve the blockage "

## 2022-01-20 NOTE — ED ATTENDING ATTESTATION
1/19/2022  IJaime DO, saw and evaluated the patient  I have discussed the patient with the resident/non-physician practitioner and agree with the resident's/non-physician practitioner's findings, Plan of Care, and MDM as documented in the resident's/non-physician practitioner's note, except where noted  All available labs and Radiology studies were reviewed  I was present for key portions of any procedure(s) performed by the resident/non-physician practitioner and I was immediately available to provide assistance  At this point I agree with the current assessment done in the Emergency Department  I have conducted an independent evaluation of this patient a history and physical is as follows:    74-year-old male presents for abdominal pain  The patient recently was diagnosed with pancreatic cancer  He reports that he was told if his pain gets worse needs to come the emergency department because he has a blockage in his bile duct  There is dilation of bile duct on CT was done most recently  He has had a weight loss and poor appetite recently  No fevers chills he has a very poor appetite  Plan is to repeat labs the liver done today that showed an elevated bilirubin and transaminitis    Discussed with GI admit to Medicine    ED Course         Critical Care Time  Procedures

## 2022-01-21 NOTE — CONSULTS
Consultation - [address] [place] [address] N 67 y o  male MRN: [id number]  Unit/Bed#: [facility] 308-01 Encounter: [id number]      Assessment/Plan   Patient Active Problem List   Diagnosis    Pain of upper abdomen    Change in bowel habit    Prostate cancer ([facility] )    BRCA gene mutation positive in male    Coronary artery disease involving native coronary artery of native heart without angina pectoris    Pancreatic mass    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 ([facility] )    Pure hypercholesterolemia    Primary hypertension    Anemia    Stage 3 chronic kidney disease ([place] [address] )    Ischemic cardiomyopathy    History of PTCA    GERD (gastroesophageal reflux disease)     Active issues specifically addressed today include:   - obstructive jaundice 2/2 large pancreatic head mass, s/p ERCP, biopsy pending   - multiple liver masses and retroperitoneal lymphadenopathy on CT imaging suggestive of metastatic disease   - duodenal obstruction s/p dilation   - s/p biliary stent placement   - BRCA gene mutation   - suspected cancer-related pain   - palliative care encounter    Plan:  #symptom management  #cancer-related pain   - continue oxy-IR 2 5 mg PO Q4H PRN [mod pain] or 5 mg PO Q4H PRN [severe pain]   - continue hydromorphone 0 2 mg IV Q3H PRN [breakthrough pain]   - OME usage yesterday: 38 5 mg    Reports adequate pain management with current regimen  Rx sent to the pharmacy for fill to ensure ready upon discharge  Tentative plan for discharge to home tomorrow  Will follow up with OP [facility] in [facility]  #insomnia   - continue temazepam 7 5 mg PO QHS PRN    #OIC   - continue docusate 100 mg PO BID PRN   - continue lubiprostone 24 mcg BID with meals   - add senna 1 tab PO QHS PRN    #nausea   - continue ondansetron 4 mg IV Q6H PRN    Sent Rx for PO ondansetron to pharmacy      #goals of care   - introduced Palliative and Supportive Care   - confirmed Full Code, Level 1   - no prior AD/LW completed, 5-Wishes provided for review   - awaiting biopsy results with plan to follow up with OP oncology for treatment plan options   - scheduled second opinion visit with [facility] end of month   - will continue to closely follow up with OP [facility] clinic    Code Status: Full Code, Level 1  Decisional apparatus:  Patient is competent on my exam today  If competence is lost, patient's substitute decision maker would default to spouse [place] 169  Advance Directive / Living Will / POLST: Not on File, not previously completed    #psychosocial support   - emotional support provided   - Marylu Aguilera,  45 years] [phone]   - two adult children   - Brain Fridge [phone]   - Jeremiah Frederick, resides in [facility] [address]   - four grandchildren [aged 10 + 6 (twins], 9, and 8]   - resides with spouse, currently with 24/7 paid care giver support   - PhD in organic chemistry, retired, previous work with [employer] x 22 years       We appreciate the opportunity to participate in this patient's care  We will continue to follow  Please do not hesitate to contact our on-call provider through our clinic answering service at [phone] should you have acute symptom control concerns  Controlled Substance [place]    [place] or [place]  reviewed: No red flags were identified; safe to proceed with prescription  Shaista Mcgregor PDMP Review       Value Time User    PDMP Reviewed  Yes 1/21/2022  2:13 PM Myla Duvall MD          History of Present Illness   Physician Requesting Consult: Dwain Liu MD  Reason for Consult / Principal Problem: pain management  Hx and PE limited by: none  HPI: Madisyn Ireland is a 67 y o  male with a PMH of pancreatic cancer s/p RT with recently detected large pancreatic head mass c/b obstructive jaundice and duodenal obstruction s/p dilation + biliary stent placement in the setting of obstructive jaundice with [facility] consulted for pain management support      Initially presented with c/o progressively worsening epigastric abdominal pain over the past 3 months  Pain is constant, aggravated with PO intake, 3-4/10 at rest, 5-6/10 at worst, 1/10 with use of oxy-IR 5 mg  Slight nausea without vomiting, antiemetic medication efficacious  Minimal appetite with aversion to eating given aggravation of pain, completes 0-1 meals/day with no snacking  Reports 15-20 lb weight loss over the past 1 month  Reports constipation, last BM today, hard stool, passing flatus  Use of lubiprostone OP, does not like taste/consistency of miralax  Fragmented sleep in the setting of pain and hospitalization  Initial labs significant for Na 136, K 4 1, corrected Ca 9 6, BUN/SCr 14/1 26 [eGFR 54]; AST//578, albumin 3 1, TBili 4 4 [DBili3 77]  WBC 4 9, Hgb 10 2, Plt 168  UA with bilirubin interference, otherwise unremarkable  CT A/P [01/19/2022] 6 4 x 5 7 x 4 9 obstructing pancreatic head mass with internal necrosis highly worrisome for primary pancreatic neoplasm  Multiple liver masses and retroperitoneal lymphadenopathy suggestive of metastatic disease  Moderate intra- and extrahepatic biliary ductal dilatation secondary to obstruction  ERCP on 01/20/2022 with stricture in first/second part of duodenum with balloon dilation + biliary stent placement give CBD stricture  FNA of pancreatic head mass + liver lesion  Biopsies pending  Inpatient consult to Palliative Care  Consult performed by: Maribel Hernandez MD  Consult ordered by: Arley Mancera MD          Review of Systems   Constitutional: Positive for appetite change, fatigue and unexpected weight change  Negative for activity change, chills and fever  HENT: Negative for congestion  Eyes: Negative for visual disturbance  Respiratory: Negative for shortness of breath  Cardiovascular: Negative for chest pain  Gastrointestinal: Positive for abdominal pain, constipation and nausea  Negative for vomiting  Genitourinary: Negative for frequency  Musculoskeletal: Negative for back pain     Neurological: Negative for syncope  Psychiatric/Behavioral: Positive for sleep disturbance  All other systems reviewed and are negative        Historical Information   Past Medical History:   Diagnosis Date    CAD (coronary artery disease)     GERD (gastroesophageal reflux disease)     Hyperlipidemia     Hypertension     Pancreatic cancer ([facility] )     Prostate cancer ([facility] )      Past Surgical History:   Procedure Laterality Date    CHOLECYSTECTOMY      CORONARY ANGIOPLASTY WITH STENT PLACEMENT       E-Cigarette/Vaping    E-Cigarette Use Never User      E-Cigarette/Vaping Substances    Nicotine No     THC No     CBD No     Flavoring No     Other No     Unknown No      Social History     Socioeconomic History    Marital status: /Civil Union     Spouse name: None    Number of children: None    Years of education: None    Highest education level: None   Occupational History    None   Tobacco Use    Smoking status: Never Smoker    Smokeless tobacco: None   Vaping Use    Vaping Use: Never used   Substance and Sexual Activity    Alcohol use: Not Currently    Drug use: Never    Sexual activity: Not Currently   Other Topics Concern    None   Social History Narrative    None     Social Determinants of Health     Financial Resource Strain: Not on file   Food Insecurity: Not on file   Transportation Needs: Not on file   Physical Activity: Not on file   Stress: Not on file   Social Connections: Not on file   Intimate Partner Violence: Not on file   Housing Stability: Not on file     Family History   Problem Relation Age of Onset    Pancreatic cancer Mother     Dementia Father     Esophageal cancer Sister        Meds/Allergies   current meds:   Current Facility-Administered Medications   Medication Dose Route Frequency    aluminum-magnesium hydroxide-simethicone (MYLANTA) oral suspension 30 mL  30 mL Oral Q6H PRN    carvedilol (COREG) tablet 12 5 mg  12 5 mg Oral BID With Meals    docusate sodium (COLACE) capsule 100 mg  100 mg Oral BID PRN    enoxaparin (LOVENOX) subcutaneous injection 40 mg  40 mg Subcutaneous Daily    HYDROmorphone HCl (DILAUDID) injection 0 2 mg  0 2 mg Intravenous Q3H PRN    lisinopril (ZESTRIL) tablet 10 mg  10 mg Oral HS    loratadine (CLARITIN) tablet 10 mg  10 mg Oral Daily    lubiprostone (AMITIZA) capsule 24 mcg  24 mcg Oral BID With Meals    multi-electrolyte (PLASMALYTE-A/ISOLYTE-S PH 7 4) IV solution  100 mL/hr Intravenous Continuous    ondansetron (ZOFRAN) injection 4 mg  4 mg Intravenous Q6H PRN    oxyCODONE (ROXICODONE) IR tablet 2 5 mg  2 5 mg Oral Q4H PRN    oxyCODONE (ROXICODONE) IR tablet 5 mg  5 mg Oral Q4H PRN    pantoprazole (PROTONIX) EC tablet 20 mg  20 mg Oral Early Morning    temazepam (RESTORIL) capsule 7 5 mg  7 5 mg Oral HS PRN         No Known Allergies    Objective     Physical Exam  Vitals and nursing note reviewed  Constitutional:       General: He is awake  Appearance: He is not diaphoretic  Comments: Lying in bed in NAD  Non-toxic appearing   HENT:      Head: Normocephalic and atraumatic  Right Ear: External ear normal       Left Ear: External ear normal    Eyes:      Comments: No gaze preference   Cardiovascular:      Rate and Rhythm: Normal rate  Pulmonary:      Effort: No tachypnea, accessory muscle usage or respiratory distress  Comments: Completes full sentences without difficulty  Musculoskeletal:      Cervical back: Normal range of motion  Neurological:      General: No focal deficit present  Mental Status: He is alert and oriented to person, place, and time  Psychiatric:         Attention and Perception: Attention normal          Mood and Affect: Mood and affect normal          Speech: Speech normal          Cognition and Memory: Cognition and memory normal          Lab Results:   I have personally reviewed pertinent labs  , CBC:   No results found for: WBC, HGB, HCT, MCV, PLT, ADJUSTEDWBC, MCH, MCHC, RDW, MPV, NRBC, CMP:   Lab Results   Component Value Date    SODIUM 138 01/21/2022    K 3 7 01/21/2022     01/21/2022    CO2 28 01/21/2022    BUN 12 01/21/2022    CREATININE 0 98 01/21/2022    CALCIUM 8 8 01/21/2022     (H) 01/21/2022     (H) 01/21/2022    ALKPHOS 326 (H) 01/21/2022    EGFR 74 01/21/2022   , PT/PTT:  No results found for: PT, PTT  Imaging Studies: I have personally reviewed pertinent reports  EKG, Pathology, and Other Studies: I have personally reviewed pertinent reports  Code Status: Level 1 - Full Code  Advance Directive and Living Will:   Not on File, not previously completed  Power of :   n/a  POLST:   n/a    Counseling / Coordination of Care  Total floor / unit time spent today 55 minutes  Greater than 50% of total time was spent with the patient and / or family counseling and / or coordination of care  A description of the counseling / coordination of care: provided medical updates, discussed palliative care, determined competency, determined goals of care, determined POA, determined social/family support, discussed plans of care, discussed symptom management, provided psychosocial support  Opioid regimen initiation, discussion regarding safe/proper opioid use  Bowel regimen adjustment  Medications sent to pharmacy to ensure adequate supply at time of discharge  PDMP Reviewed   Coordinated plan of care with primary team

## 2022-01-21 NOTE — PROGRESS NOTES
Per [facility] - Progress Note  Arturo Yi 67 y o  male MRN: [id number]  Unit/Bed#: [facility] 308-01 Encounter: [id number]      ASSESSMENT & PLAN:    44-year-old male with history of hypertension, hyperlipidemia, coronary disease presented with complaints of abdominal pain with new findings of obstructive jaundice and pancreatic head mass  GI consultation requested for evaluation of pancreatic head mass for need for possible EUS and ERCP      1  Pancreatic malignancy with local metastases, obstructive jaundice - status post EUS/ERCP on 1/20 with FNB of pancreatic head mass and left lobe liver lesion showing preliminary malignant cells  Likely related to family history of BRCA gene mutation increasing risk for malignancy  LFTs improving status post ERCP with biliary stent placement  However, patient continues to have abdominal pain which is largely unchanged from prior to his procedure  Mild improvement with analgesics  · Follow-up final pathology results  · Medical Oncology consulted; appreciate recommendations  · Patient is likely a poor candidate for any surgical intervention; as such, will hold off on consulting Surgical Oncology at this time  · Consider referral to genetic counselor for familial testing inappropriate screenings for family members given family history of BRCA gene mutation  · Advanced diet to surgical soft  · Given his ongoing pain, may be appropriate to consider repeat EUS with celiac plexus block for better pain control and to minimize use of opioid analgesics; this can likely be done as outpatient in a couple of weeks if needed  · Supportive care per primary team  · Okay for discharge from GI standpoint  · Outpatient follow-up with medical oncology     2  Duodenal narrowing -  significant narrowing seen in the duodenal sweep and bulb secondary to compression and localized edema from pancreatic malignancy  Dilation was performed during the procedure    Tolerating clear liquids as of now  However, certainly concerning for potential to develop outlet obstruction as disease progresses  · Advance diet to surgical soft as tolerated  · Depending on clinical course inability to tolerate more solid food, may warrant repeat endoscopic evaluation for potential duodenal stenting versus bypass if necessary with lumen-apposing stent/Axios; this can be considered in the future electively as outpatient if needed  · Follow clinically   · Supportive care per primary team    ______________________________________________________________________    SUBJECTIVE:     Patient seen and evaluated at bedside  Reports pain in the abdomen is largely unchanged from prior to procedure although he does report possible minimal improvement  Tolerating clear liquids  Denies any nausea or vomiting      Medication Administration - last 24 hours from 01/20/2022 0827 to 01/21/2022 0827       Date/Time Order Dose Route Action Action by     01/21/2022 0810 carvedilol (COREG) tablet 12 5 mg 12 5 mg Oral Given Chad Guillory RN     01/20/2022 1726 carvedilol (COREG) tablet 12 5 mg 12 5 mg Oral Given Chad Guillory RN     01/20/2022 2110 lisinopril (ZESTRIL) tablet 10 mg 10 mg Oral Given Sosa Aj     01/21/2022 0810 lubiprostone (AMITIZA) capsule 24 mcg 24 mcg Oral Given Chad uGillory RN     01/20/2022 1726 lubiprostone (AMITIZA) capsule 24 mcg 24 mcg Oral Given Chad Guillory RN     01/20/2022 1345 lubiprostone (AMITIZA) capsule 24 mcg 24 mcg Oral Not Given Chad Guillory RN     01/21/2022 0500 pantoprazole (PROTONIX) EC tablet 20 mg 20 mg Oral Given Sosa Aj     01/21/2022 0810 loratadine (CLARITIN) tablet 10 mg 10 mg Oral Given Chad Guillory RN     01/21/2022 0321 multi-electrolyte (PLASMALYTE-A/ISOLYTE-S PH 7 4) IV solution 100 mL/hr Intravenous New Bag Consuelo Jones     01/21/2022 0810 ondansetron (ZOFRAN) injection 4 mg 4 mg Intravenous Given Chad Guillory RN     01/20/2022 2254 ondansetron (ZOFRAN) injection 4 mg 4 mg Intravenous Given Lemmie Janus     01/20/2022 1825 ondansetron (ZOFRAN) injection 4 mg   Intravenous Canceled Entry Andrew Clinton RN     01/21/2022 0810 HYDROmorphone HCl (DILAUDID) injection 0 2 mg 0 2 mg Intravenous Given Andrew Clinton RN     01/21/2022 0328 HYDROmorphone HCl (DILAUDID) injection 0 2 mg 0 2 mg Intravenous Given Lemmie Jan     01/20/2022 2111 HYDROmorphone HCl (DILAUDID) injection 0 2 mg 0 2 mg Intravenous Given Lemmie Jan     01/20/2022 1726 HYDROmorphone HCl (DILAUDID) injection 0 2 mg 0 2 mg Intravenous Given Andrew Clinton RN     01/21/2022 0810 enoxaparin (LOVENOX) subcutaneous injection 40 mg 40 mg Subcutaneous Given Andrew Clinton RN     01/20/2022 1502 indomethacin (INDOCIN) rectal suppository 100 mg Rectal Canceled Entry Hamzah Chowdhury MD     01/20/2022 1536 indomethacin (INDOCIN) rectal suppository 100 mg Rectal Given Hamzah Chowdhury MD     01/20/2022 1515 iohexol (OMNIPAQUE) 240 MG/ML solution 50 mL 9 mL Other Given by Other Erika Remedies     01/20/2022 1826 ondansetron (ZOFRAN) injection 4 mg 4 mg Intravenous Given Andrew Clinton RN          OBJECTIVE:     Objective   Blood pressure 108/55, pulse 73, temperature 98 1 °F (36 7 °C), temperature source Oral, resp  rate 18, height 5' 8" (1 727 m), SpO2 96 %  Body mass index is 29 8 kg/m²      Intake/Output Summary (Last 24 hours) at 1/21/2022 0827  Last data filed at 1/21/2022 0321  Gross per 24 hour   Intake 3551 67 ml   Output --   Net 3551 67 ml       PHYSICAL EXAM:   General Appearance: Awake and alert, in no acute distress  Abdomen: Soft, mild TTP in upper abdomen, non-distended; bowel sounds normal; no masses or no organomegaly    Invasive Devices  Report    Peripheral Intravenous Line            Peripheral IV 01/19/22 Right Forearm 1 day                LAB RESULTS:  Admission on 01/19/2022   Component Date Value    WBC 01/19/2022 5 57     RBC 01/19/2022 3 12*    Hemoglobin 01/19/2022 9 7*    Hematocrit 01/19/2022 29 5*    MCV 01/19/2022 95     MCH 01/19/2022 31 1     MCHC 01/19/2022 32 9     RDW 01/19/2022 14 0     MPV 01/19/2022 11 1     Platelets 47/34/6066 162     nRBC 01/19/2022 0     Neutrophils Relative 01/19/2022 83*    Immat GRANS % 01/19/2022 0     Lymphocytes Relative 01/19/2022 7*    Monocytes Relative 01/19/2022 9     Eosinophils Relative 01/19/2022 1     Basophils Relative 01/19/2022 0     Neutrophils Absolute 01/19/2022 4 63     Immature Grans Absolute 01/19/2022 0 02     Lymphocytes Absolute 01/19/2022 0 37*    Monocytes Absolute 01/19/2022 0 51     Eosinophils Absolute 01/19/2022 0 03     Basophils Absolute 01/19/2022 0 01     Sodium 01/19/2022 135*    Potassium 01/19/2022 3 8     Chloride 01/19/2022 101     CO2 01/19/2022 27     ANION GAP 01/19/2022 7     BUN 01/19/2022 15     Creatinine 01/19/2022 1 15     Glucose 01/19/2022 152*    Calcium 01/19/2022 10 1     eGFR 01/19/2022 61     Total Bilirubin 01/19/2022 4 85*    Bilirubin, Direct 01/19/2022 3 77*    Alkaline Phosphatase 01/19/2022 336*    AST 01/19/2022 424*    ALT 01/19/2022 539*    Total Protein 01/19/2022 7 3     Albumin 01/19/2022 3 2*    Protime 01/19/2022 14 5     INR 01/19/2022 1 18     PTT 01/19/2022 29     Sodium 01/21/2022 138     Potassium 01/21/2022 3 7     Chloride 01/21/2022 104     CO2 01/21/2022 28     ANION GAP 01/21/2022 6     BUN 01/21/2022 12     Creatinine 01/21/2022 0 98     Glucose 01/21/2022 83     Glucose, Fasting 01/21/2022 83     Calcium 01/21/2022 8 8     Corrected Calcium 01/21/2022 9 7     AST 01/21/2022 302*    ALT 01/21/2022 446*    Alkaline Phosphatase 01/21/2022 326*    Total Protein 01/21/2022 6 7     Albumin 01/21/2022 2 9*    Total Bilirubin 01/21/2022 1 77*    eGFR 01/21/2022 74     Bilirubin, Direct 01/21/2022 1 02*       RADIOLOGY RESULTS: I have personally reviewed pertinent imaging studies  EMILY Jett  Gastroenterology Fellow  [facility]  Available on [email or web address]

## 2022-01-21 NOTE — ASSESSMENT & PLAN NOTE
· Patient was sent here from Gastroenterology office for stent placement/ERCP  · Patient had esophageal dilatation and biliary stent placement done by Gastroenterology yesterday  · LFTs down trending  · Currently on clear liquid diet-advance as per GI -discussed soft diet and nutritional shakes  · Patient still complaining of pain-add oxycodone  palliative care consult and further outpt follow up   · Ct imaging demonstrates: 6 4 x 5 7 x 4 9 cm obstructing pancreatic head mass with internal necrosis highly worrisome for primary pancreatic neoplasm   Multiple liver masses and retroperitoneal lymphadenopathy suggestive of metastatic disease   Moderate intra- and extrahepatic   biliary ductal dilatation secondary to obstruction  Mild diffusely mottled appearance of the pelvic osseous structures may be sequela of prior prostate radiation treatment therapy although metastatic disease cannot be excluded

## 2022-01-21 NOTE — TELEPHONE ENCOUNTER
Appointment Confirmation     Appointment with  Dr Dempsey   Appointment date & time 02/01/22 @ 11:20am    Location [facility]    Patient verbilized Understanding

## 2022-01-21 NOTE — ASSESSMENT & PLAN NOTE
Patient was sent here from Gastroenterology office for stent placement/ERCP  Patient had esophageal dilatation and biliary stent placement done by Gastroenterology yesterday  LFTs down trending  Currently on clear liquid diet-advance as per GI  Patient still complaining of pain-add oxycodone    GI is considering nerve block if the pain is still persistent  Will change to inpatient status

## 2022-01-21 NOTE — TELEPHONE ENCOUNTER
Prior Authorization needed for Oxycodone 5mg tablets    KEY# [id number]  Last Name   Williemae Denver    1945    Pharmacy:    [facility]     [phone]  FAX   [phone]

## 2022-01-21 NOTE — PROGRESS NOTES
[address] [facility]  Progress Note Scott Greenberg 1945, 67 y o  male MRN: [id number]  Unit/Bed#: [facility] 308-01 Encounter: [id number]  Primary Care Provider: Richi Beal DO   Date and time admitted to hospital: 1/19/2022  9:12 PM    * 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 [facility])  Assessment & Plan  Patient was sent here from Gastroenterology office for stent placement/ERCP  Patient had esophageal dilatation and biliary stent placement done by Gastroenterology yesterday  LFTs down trending  Currently on clear liquid diet-advance as per GI  Patient still complaining of pain-add oxycodone  May benefit from palliative care consult - will discuss with patient     Primary hypertension  Assessment & Plan  On lisinopril 10 mg daily  Pure hypercholesterolemia  Assessment & Plan  Patient was on statin as outpatient  Restart on the statin when the LFTs improved    Pancreatic mass  Assessment & Plan  Currently, the patient is being worked up with ERCP by GI however further care is still being decided upon  Oncology evaluation appreciated-pathology still pending    Coronary artery disease involving native coronary artery of native heart without angina pectoris  Assessment & Plan  Continue Coreg 12 5 mg twice daily  VTE Pharmacologic Prophylaxis:   Pharmacologic: Enoxaparin (Lovenox)  Mechanical VTE Prophylaxis in Place: Yes    Patient Centered Rounds: I have performed bedside rounds with nursing staff today  Discussions with Specialists or Other Care Team Provider:     Education and Discussions with Family / Patient:  Patient    Time Spent for Care: 30 minutes  More than 50% of total time spent on counseling and coordination of care as described above      Current Length of Stay: 0 day(s)    Current Patient Status: Inpatient   Certification Statement: The patient will continue to require additional inpatient hospital stay due to Obstructive jaundice    Discharge Plan:     Code Status: Level 1 - Full Code      Subjective:   Patient seen and examined  Status post ERCP with biliary stent placement and also balloon dilatation of the dueodenum  Tolerated clear liquid diet    Objective:     Vitals:   Temp (24hrs), Av 2 °F (36 8 °C), Min:96 8 °F (36 °C), Max:99 9 °F (37 7 °C)    Temp:  [96 8 °F (36 °C)-99 9 °F (37 7 °C)] 99 9 °F (37 7 °C)  HR:  [69-86] 74  Resp:  [16-18] 17  BP: (108-144)/(55-74) 133/67  SpO2:  [92 %-96 %] 92 %  Body mass index is 29 8 kg/m²  Input and Output Summary (last 24 hours): Intake/Output Summary (Last 24 hours) at 2022 1316  Last data filed at 2022 0851  Gross per 24 hour   Intake 4271 67 ml   Output --   Net 4271 67 ml       Physical Exam:     Physical Exam  Constitutional:       General: He is not in acute distress  HENT:      Head: Normocephalic  Comments: Band aid on the fore head     Mouth/Throat:      Pharynx: No oropharyngeal exudate  Eyes:      General: Scleral icterus present  Cardiovascular:      Rate and Rhythm: Normal rate and regular rhythm  Pulses: Normal pulses  Heart sounds: Normal heart sounds  Pulmonary:      Effort: Pulmonary effort is normal       Breath sounds: Normal breath sounds  Abdominal:      General: Abdomen is flat  There is no distension  Palpations: There is no mass  Tenderness: There is no abdominal tenderness  Musculoskeletal:         General: Normal range of motion  Cervical back: Normal range of motion  Skin:     General: Skin is warm  Neurological:      General: No focal deficit present  Mental Status: He is alert  Cranial Nerves: No cranial nerve deficit           Additional Data:     Labs:    Results from last 7 days   Lab Units 22  2239   WBC Thousand/uL 5 57   HEMOGLOBIN g/dL 9 7*   HEMATOCRIT % 29 5*   PLATELETS Thousands/uL 162   NEUTROS PCT % 83*   LYMPHS PCT % 7*   MONOS PCT % 9   EOS PCT % 1     Results from last 7 days   Lab Units 22  1884 POTASSIUM mmol/L 3 7   CHLORIDE mmol/L 104   CO2 mmol/L 28   BUN mg/dL 12   CREATININE mg/dL 0 98   CALCIUM mg/dL 8 8   ALK PHOS U/L 326*   ALT U/L 446*   AST U/L 302*     Results from last 7 days   Lab Units 01/19/22  2240   INR  1 18       * I Have Reviewed All Lab Data Listed Above  * Additional Pertinent Lab Tests Reviewed: [place] 65 Admission Reviewed    Imaging:    Imaging Reports Reviewed Today Include:   Imaging Personally Reviewed by Myself Includes:      Recent Cultures (last 7 days):           Last 24 Hours Medication List:   Current Facility-Administered Medications   Medication Dose Route Frequency Provider Last Rate    aluminum-magnesium hydroxide-simethicone  30 mL Oral Q6H PRN Caitlin Salmeron MD      carvedilol  12 5 mg Oral BID With Meals Caitlin Salmeron MD      docusate sodium  100 mg Oral BID PRN Caitlin Salmeron MD      enoxaparin  40 mg Subcutaneous Daily Caitlin Salmeron MD      HYDROmorphone  0 2 mg Intravenous Q3H PRN Mary Ellen Yost MD      lisinopril  10 mg Oral HS Caitlin Salmeron MD      loratadine  10 mg Oral Daily Caitlin Salmeron MD      lubiprostone  24 mcg Oral BID With Meals Caitlin Salmeron MD      multi-electrolyte  100 mL/hr Intravenous Continuous Caitlin Salmeron  mL/hr (01/21/22 1214)    ondansetron  4 mg Intravenous Q6H PRN Caitlin Salmeron MD      oxyCODONE  2 5 mg Oral Q4H PRN Mary Ellen Yost, MD      oxyCODONE  5 mg Oral Q4H PRN Mary Ellen Yost, MD      pantoprazole  20 mg Oral Early Morning Caitlin Salmeron MD      temazepam  7 5 mg Oral HS PRN Caitlin Salmeron MD          Today, Patient Was Seen By: Mary Ellen Yost MD    ** Please Note: Dictation voice to text software may have been used in the creation of this document   **

## 2022-01-21 NOTE — PLAN OF CARE
Problem: PAIN - ADULT  Goal: Verbalizes/displays adequate comfort level or baseline comfort level  Description: Interventions:  - Encourage patient to monitor pain and request assistance  - Assess pain using appropriate pain scale  - Administer analgesics based on type and severity of pain and evaluate response  - Implement non-pharmacological measures as appropriate and evaluate response  - Consider cultural and social influences on pain and pain management  - Notify physician/advanced practitioner if interventions unsuccessful or patient reports new pain  Outcome: Progressing     Problem: INFECTION - ADULT  Goal: Absence or prevention of progression during hospitalization  Description: INTERVENTIONS:  - Assess and monitor for signs and symptoms of infection  - Monitor lab/diagnostic results  - Monitor all insertion sites, i e  indwelling lines, tubes, and drains  - Monitor endotracheal if appropriate and nasal secretions for changes in amount and color  - [place] appropriate cooling/warming therapies per order  - Administer medications as ordered  - Instruct and encourage patient and family to use good hand hygiene technique  - Identify and instruct in appropriate isolation precautions for identified infection/condition  Outcome: Progressing  Goal: Absence of fever/infection during neutropenic period  Description: INTERVENTIONS:  - Monitor WBC    Outcome: Progressing     Problem: SAFETY ADULT  Goal: Patient will remain free of falls  Description: INTERVENTIONS:  - Educate patient/family on patient safety including physical limitations  - Instruct patient to call for assistance with activity   - Consult OT/PT to assist with strengthening/mobility   - Keep Call bell within reach  - Keep bed low and locked with side rails adjusted as appropriate  - Keep care items and personal belongings within reach  - Initiate and maintain comfort rounds  - Make Fall Risk Sign visible to staff  - Offer Toileting every 2 Hours, in advance of need  - Initiate/Maintain no alarm  - Obtain necessary fall risk management equipment: none  - Apply yellow socks and bracelet for high fall risk patients  - Consider moving patient to room near nurses station  Outcome: Progressing  Goal: Maintain or return to baseline ADL function  Description: INTERVENTIONS:  -  Assess patient's ability to carry out ADLs; assess patient's baseline for ADL function and identify physical deficits which impact ability to perform ADLs (bathing, care of mouth/teeth, toileting, grooming, dressing, etc )  - Assess/evaluate cause of self-care deficits   - Assess range of motion  - Assess patient's mobility; develop plan if impaired  - Assess patient's need for assistive devices and provide as appropriate  - Encourage maximum independence but intervene and supervise when necessary  - Involve family in performance of ADLs  - Assess for home care needs following discharge   - Consider OT consult to assist with ADL evaluation and planning for discharge  - Provide patient education as appropriate  Outcome: Progressing  Goal: Maintains/Returns to pre admission functional level  Description: INTERVENTIONS:  - Perform BMAT or MOVE assessment daily    - Set and communicate daily mobility goal to care team and patient/family/caregiver  - Collaborate with rehabilitation services on mobility goals if consulted  - Perform Range of Motion 3 times a day  - Reposition patient every 2 hours    - Dangle patient 3 times a day  - Stand patient 3 times a day  - Ambulate patient 3 times a day  - Out of bed to chair 3 times a day   - Out of bed for meals 3 times a day  - Out of bed for toileting  - Record patient progress and toleration of activity level   Outcome: Progressing     Problem: DISCHARGE PLANNING  Goal: Discharge to home or other facility with appropriate resources  Description: INTERVENTIONS:  - Identify barriers to discharge w/patient and caregiver  - Arrange for needed discharge resources and transportation as appropriate  - Identify discharge learning needs (meds, wound care, etc )  - Arrange for interpretive services to assist at discharge as needed  - Refer to Case Management Department for coordinating discharge planning if the patient needs post-hospital services based on physician/advanced practitioner order or complex needs related to functional status, cognitive ability, or social support system  Outcome: Progressing     Problem: Knowledge Deficit  Goal: Patient/family/caregiver demonstrates understanding of disease process, treatment plan, medications, and discharge instructions  Description: Complete learning assessment and assess knowledge base  Interventions:  - Provide teaching at level of understanding  - Provide teaching via preferred learning methods  Outcome: Progressing     Problem: Nutrition/Hydration-ADULT  Goal: Nutrient/Hydration intake appropriate for improving, restoring or maintaining nutritional needs  Description: Monitor and assess patient's nutrition/hydration status for malnutrition  Collaborate with interdisciplinary team and initiate plan and interventions as ordered  Monitor patient's weight and dietary intake as ordered or per policy  Utilize nutrition screening tool and intervene as necessary  Determine patient's food preferences and provide high-protein, high-caloric foods as appropriate       INTERVENTIONS:  - Monitor oral intake, urinary output, labs, and treatment plans  - Assess nutrition and hydration status and recommend course of action  - Evaluate amount of meals eaten  - Assist patient with eating if necessary   - Allow adequate time for meals  - Recommend/ encourage appropriate diets, oral nutritional supplements, and vitamin/mineral supplements  - Order, calculate, and assess calorie counts as needed  - Recommend, monitor, and adjust tube feedings and TPN/PPN based on assessed needs  - Assess need for intravenous fluids  - Provide specific nutrition/hydration education as appropriate  - Include patient/family/caregiver in decisions related to nutrition  Outcome: Progressing     Problem: Potential for Falls  Goal: Patient will remain free of falls  Description: INTERVENTIONS:  - Educate patient/family on patient safety including physical limitations  - Instruct patient to call for assistance with activity   - Consult OT/PT to assist with strengthening/mobility   - Keep Call bell within reach  - Keep bed low and locked with side rails adjusted as appropriate  - Keep care items and personal belongings within reach  - Initiate and maintain comfort rounds  - Make Fall Risk Sign visible to staff  - Offer Toileting every 2 Hours, in advance of need  - Initiate/Maintain no alarm  - Obtain necessary fall risk management equipment: none needed  - Apply yellow socks and bracelet for high fall risk patients  - Consider moving patient to room near nurses station  Outcome: Progressing

## 2022-01-21 NOTE — ASSESSMENT & PLAN NOTE
· Currently, a EUS/ERCP performed on 01/20/2022  · - recommend follow-up outpatient and ongoing oncology follow-up for further plan of care/treatment  · Oncology evaluation appreciated-pathology still pending at discharge but returned final on 1/24

## 2022-01-21 NOTE — PLAN OF CARE
Problem: Potential for Falls  Goal: Patient will remain free of falls  Description: INTERVENTIONS:  - Educate patient/family on patient safety including physical limitations  - Instruct patient to call for assistance with activity   - Consult OT/PT to assist with strengthening/mobility   - Keep Call bell within reach  - Keep bed low and locked with side rails adjusted as appropriate  - Keep care items and personal belongings within reach  - Initiate and maintain comfort rounds  - Make Fall Risk Sign visible to staff  - Offer Toileting every  Hours, in advance of need  - Initiate/Maintain alarm  - Obtain necessary fall risk management equipment:   - Apply yellow socks and bracelet for high fall risk patients  - Consider moving patient to room near nurses station  Outcome: Progressing     Problem: SAFETY ADULT  Goal: Patient will remain free of falls  Description: INTERVENTIONS:  - Educate patient/family on patient safety including physical limitations  - Instruct patient to call for assistance with activity   - Consult OT/PT to assist with strengthening/mobility   - Keep Call bell within reach  - Keep bed low and locked with side rails adjusted as appropriate  - Keep care items and personal belongings within reach  - Initiate and maintain comfort rounds  - Make Fall Risk Sign visible to staff  - Offer Toileting every  Hours, in advance of need  - Initiate/Maintain alarm  - Obtain necessary fall risk management equipment:   - Apply yellow socks and bracelet for high fall risk patients  - Consider moving patient to room near nurses station  Outcome: Progressing

## 2022-01-21 NOTE — ASSESSMENT & PLAN NOTE
Currently, the patient is being worked up with ERCP by GI however further care is still being decided upon    Oncology evaluation appreciated-pathology still pending

## 2022-01-22 NOTE — PLAN OF CARE
Problem: PAIN - ADULT  Goal: Verbalizes/displays adequate comfort level or baseline comfort level  Description: Interventions:  - Encourage patient to monitor pain and request assistance  - Assess pain using appropriate pain scale  - Administer analgesics based on type and severity of pain and evaluate response  - Implement non-pharmacological measures as appropriate and evaluate response  - Consider cultural and social influences on pain and pain management  - Notify physician/advanced practitioner if interventions unsuccessful or patient reports new pain  Outcome: Progressing     Problem: INFECTION - ADULT  Goal: Absence or prevention of progression during hospitalization  Description: INTERVENTIONS:  - Assess and monitor for signs and symptoms of infection  - Monitor lab/diagnostic results  - Monitor all insertion sites, i e  indwelling lines, tubes, and drains  - Monitor endotracheal if appropriate and nasal secretions for changes in amount and color  - [place] appropriate cooling/warming therapies per order  - Administer medications as ordered  - Instruct and encourage patient and family to use good hand hygiene technique  - Identify and instruct in appropriate isolation precautions for identified infection/condition  Outcome: Progressing  Goal: Absence of fever/infection during neutropenic period  Description: INTERVENTIONS:  - Monitor WBC    Outcome: Progressing     Problem: SAFETY ADULT  Goal: Patient will remain free of falls  Description: INTERVENTIONS:  - Educate patient/family on patient safety including physical limitations  - Instruct patient to call for assistance with activity   - Consult OT/PT to assist with strengthening/mobility   - Keep Call bell within reach  - Keep bed low and locked with side rails adjusted as appropriate  - Keep care items and personal belongings within reach  - Initiate and maintain comfort rounds  - Make Fall Risk Sign visible to staff  - Offer Toileting every    Hours, in advance of need  - Initiate/Maintain   alarm  - Obtain necessary fall risk management equipment:       - Apply yellow socks and bracelet for high fall risk patients  - Consider moving patient to room near nurses station  Outcome: Progressing  Goal: Maintain or return to baseline ADL function  Description: INTERVENTIONS:  -  Assess patient's ability to carry out ADLs; assess patient's baseline for ADL function and identify physical deficits which impact ability to perform ADLs (bathing, care of mouth/teeth, toileting, grooming, dressing, etc )  - Assess/evaluate cause of self-care deficits   - Assess range of motion  - Assess patient's mobility; develop plan if impaired  - Assess patient's need for assistive devices and provide as appropriate  - Encourage maximum independence but intervene and supervise when necessary  - Involve family in performance of ADLs  - Assess for home care needs following discharge   - Consider OT consult to assist with ADL evaluation and planning for discharge  - Provide patient education as appropriate  Outcome: Progressing  Goal: Maintains/Returns to pre admission functional level  Description: INTERVENTIONS:  - Perform BMAT or MOVE assessment daily    - Set and communicate daily mobility goal to care team and patient/family/caregiver  - Collaborate with rehabilitation services on mobility goals if consulted  - Perform Range of Motion    times a day  - Reposition patient every      hours    - Dangle patient    times a day  - Stand patient    times a day  - Ambulate patient    times a day  - Out of bed to chair      times a day   - Out of bed for meals    times a day  - Out of bed for toileting  - Record patient progress and toleration of activity level   Outcome: Progressing     Problem: DISCHARGE PLANNING  Goal: Discharge to home or other facility with appropriate resources  Description: INTERVENTIONS:  - Identify barriers to discharge w/patient and caregiver  - Arrange for needed discharge resources and transportation as appropriate  - Identify discharge learning needs (meds, wound care, etc )  - Arrange for interpretive services to assist at discharge as needed  - Refer to Case Management Department for coordinating discharge planning if the patient needs post-hospital services based on physician/advanced practitioner order or complex needs related to functional status, cognitive ability, or social support system  Outcome: Progressing     Problem: Knowledge Deficit  Goal: Patient/family/caregiver demonstrates understanding of disease process, treatment plan, medications, and discharge instructions  Description: Complete learning assessment and assess knowledge base  Interventions:  - Provide teaching at level of understanding  - Provide teaching via preferred learning methods  Outcome: Progressing     Problem: Nutrition/Hydration-ADULT  Goal: Nutrient/Hydration intake appropriate for improving, restoring or maintaining nutritional needs  Description: Monitor and assess patient's nutrition/hydration status for malnutrition  Collaborate with interdisciplinary team and initiate plan and interventions as ordered  Monitor patient's weight and dietary intake as ordered or per policy  Utilize nutrition screening tool and intervene as necessary  Determine patient's food preferences and provide high-protein, high-caloric foods as appropriate       INTERVENTIONS:  - Monitor oral intake, urinary output, labs, and treatment plans  - Assess nutrition and hydration status and recommend course of action  - Evaluate amount of meals eaten  - Assist patient with eating if necessary   - Allow adequate time for meals  - Recommend/ encourage appropriate diets, oral nutritional supplements, and vitamin/mineral supplements  - Order, calculate, and assess calorie counts as needed  - Recommend, monitor, and adjust tube feedings and TPN/PPN based on assessed needs  - Assess need for intravenous fluids  - Provide specific nutrition/hydration education as appropriate  - Include patient/family/caregiver in decisions related to nutrition  Outcome: Progressing     Problem: Potential for Falls  Goal: Patient will remain free of falls  Description: INTERVENTIONS:  - Educate patient/family on patient safety including physical limitations  - Instruct patient to call for assistance with activity   - Consult OT/PT to assist with strengthening/mobility   - Keep Call bell within reach  - Keep bed low and locked with side rails adjusted as appropriate  - Keep care items and personal belongings within reach  - Initiate and maintain comfort rounds  - Make Fall Risk Sign visible to staff  - Offer Toileting every    Hours, in advance of need  - Initiate/Maintain alarm  - Obtain necessary fall risk management equipment:     - Apply yellow socks and bracelet for high fall risk patients  - Consider moving patient to room near nurses station  Outcome: Progressing

## 2022-01-22 NOTE — DISCHARGE SUMMARY
[address] [facility]  Discharge- Melissa Arias 1945, 67 y o  male MRN: [id number]  Unit/Bed#: [facility] 308-01 Encounter: [id number]  Primary Care Provider: Dhruv Glynn DO   Date and time admitted to hospital: 1/19/2022  9:12 PM    * 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 [facility])  Assessment & Plan  · Patient was sent here from Gastroenterology office for stent placement/ERCP  · Patient had esophageal dilatation and biliary stent placement done by Gastroenterology yesterday  · LFTs down trending  · Currently on clear liquid diet-advance as per GI -discussed soft diet and nutritional shakes  · Patient still complaining of pain-add oxycodone  palliative care consult and further outpt follow up   · Ct imaging demonstrates: 6 4 x 5 7 x 4 9 cm obstructing pancreatic head mass with internal necrosis highly worrisome for primary pancreatic neoplasm   Multiple liver masses and retroperitoneal lymphadenopathy suggestive of metastatic disease   Moderate intra- and extrahepatic   biliary ductal dilatation secondary to obstruction  Mild diffusely mottled appearance of the pelvic osseous structures may be sequela of prior prostate radiation treatment therapy although metastatic disease cannot be excluded  Primary hypertension  Assessment & Plan  · On lisinopril 10 mg daily  Pure hypercholesterolemia  Assessment & Plan  · Patient was on statin as outpatient  · Restart on the statin when the LFTs improved    Pancreatic mass  Assessment & Plan  · Currently, a EUS/ERCP performed on 01/20/2022  · - recommend follow-up outpatient and ongoing oncology follow-up for further plan of care/treatment  · Oncology evaluation appreciated-pathology still pending at discharge but returned final on 1/24    Coronary artery disease involving native coronary artery of native heart without angina pectoris  Assessment & Plan  · Continue Coreg 12 5 mg twice daily        Medical Problems             Resolved Problems Date Reviewed: 1/22/2022    None              Discharging Physician / Practitioner: WILMER Juarez  PCP: Yves Rey DO  Admission Date:   Admission Orders (From admission, onward)     Ordered        01/21/22 0955  Inpatient Admission  Once            01/19/22 2200  Place in Observation  Once                      Discharge Date: 01/22/22    Consultations During Hospital Stay:  · Dr Roca Falling    Procedures Performed:   · ast 207, alt 375, and alk phos 295  · Total bili 1 37  · inr 1 18  · Ca 19 9 2536  · Ct abdomen and pelvis w contrast 1/19:6 4 x 5 7 x 4 9 cm obstructing pancreatic head mass with internal necrosis highly worrisome for primary pancreatic neoplasm   Multiple liver masses and retroperitoneal lymphadenopathy suggestive of metastatic disease   Moderate intra- and extrahepatic   biliary ductal dilatation secondary to obstruction  Mild diffusely mottled appearance of the pelvic osseous structures may be sequela of prior prostate radiation treatment therapy although metastatic disease cannot be excluded  · Ercp 1/20:Narrowing seen at the duodenal sweep from the external compression from the tumor  Successful ERCP after duodenal sweep dilation  A metal stent was placed in the area of distal bile duct narrowing  Significant Findings / Test Results:   · See above     Incidental Findings:   · None     Test Results Pending at Discharge (will require follow up): · Biopsy etc     Outpatient Tests Requested:  · Call to schedule follow up within the next week   · Call to schedule follow up with Dr Francesca El in the clinic GI  · Follow up with oncology on 2/1 at 11 05am  · Call to schedule follow up with palliative care at soonest available appointment     Complications:  none    Reason for Admission:  Weight loss and obstructive jaundice with newly diagnosed pancreatic mass    Hospital Course:    Mariann Noland is a 67 y o  male patient who originally presented to the hospital on 1/19/2022 due to weight loss and obstructive jaundice newly diagnosed pancreatic mass  Patient's past medical history for hypertension reflux prostate cancer, CAD, hyperlipidemia and hypertension  Patient has been seeing GI due to 2 month history of abdominal discomfort with change in bowel habits and constipation  He had also noted a 15 lb weight loss in the last month  CT of the abdomen demonstrated pancreatic mass on the head of the pancreas measuring 6 4 x 5 7 cm with impingement of the duodenum with ductal dilatation  Patient also has significant history for the bracket gene mutation with multiple family members with malignancies  Patient was also noted to have metastatic lesions imaging was reviewed by the GI team and EUS was ordered to evaluate and biopsy the pancreatic lesions possibly liver lesions as well  An ERCP was recommended for biliary obstruction and discussion was had in regards to alternative therapies of ERCP was unsuccessful  For the duodenal obstruction GI recommended possible duodenal stent and possible celiac block if pain persisted  Palliative Medicine were consulted   On 01/20 patient underwent EUS/ERCP with AVN be a pancreatic head mass and left lobe liver lesion, demonstrating preliminary making malignant cells  LFTs were improving post ERCP with biliary stent placement  Patient however continued to have abdominal pain  discussion was had with patient in regards to diet and nutrition patient was noted to be very nervous  Biopsies at discharge were pending, LFTs improved  And patient was tolerating some soft liquid and nutritional diet  Recommendations for the patient were to follow up with palliative care for further pain management and control  And if eating and then she became unbearable would recommend duodenal stent versus gastric jejunostomy  GI felt that patient was okay to be discharged home patient remained 1 more evening  He was tolerating some Ensure shakes    GI had discussed using soft diet and focusing on Ensure and boost   Biopsy obtained on 01/20/2022 returned with final results on 01/24/2022 for 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  Patient does have follow-up visits on February 1st with Oncology for which they will discuss these results and further management  Dr Mix from GI will also follow with patient for further management/treatment if deemed necessary per Oncology/patient and palliative care  Patient will need to follow up with PCP and soonest available appointment he will also have visiting nurses for ongoing care  Please see above list of diagnoses and related plan for additional information  Condition at Discharge: fair    Discharge Day Visit / Exam:   Subjective:  Pt is very nervous about going home and making it though to next casandra as he is unsure and nervous about eating  We talked through diet and eating nutritious supplements and soups  We had discussion about symptoms if worse  We also discussed follow up   Vitals: Blood Pressure: 143/75 (01/22/22 0745)  Pulse: 66 (01/22/22 0745)  Temperature: 98 4 °F (36 9 °C) (01/22/22 0745)  Temp Source: Oral (01/22/22 0745)  Respirations: 16 (01/22/22 0745)  Height: 5' 8" (172 7 cm) (01/20/22 1445)  SpO2: 92 % (01/22/22 0745)  Exam:   Physical Exam  Constitutional:       General: He is not in acute distress  Appearance: He is obese  He is not ill-appearing, toxic-appearing or diaphoretic  HENT:      Head: Normocephalic and atraumatic  Eyes:      General:         Right eye: No discharge  Left eye: No discharge  Cardiovascular:      Rate and Rhythm: Normal rate  Heart sounds: No murmur heard  No friction rub  No gallop  Pulmonary:      Effort: No respiratory distress  Breath sounds: No stridor  No wheezing, rhonchi or rales  Chest:      Chest wall: No tenderness  Abdominal:      General: There is no distension  Palpations: There is no mass  Tenderness:  There is no abdominal tenderness  There is no right CVA tenderness, left CVA tenderness, guarding or rebound  Hernia: No hernia is present  Musculoskeletal:         General: No swelling, tenderness, deformity or signs of injury  Right lower leg: No edema  Left lower leg: No edema  Skin:     Coloration: Skin is not jaundiced or pale  Findings: No bruising, erythema, lesion or rash  Neurological:      Mental Status: He is alert and oriented to person, place, and time  Psychiatric:         Behavior: Behavior normal           Discussion with Family: Patient declined call to   Discharge instructions/Information to patient and family:   See after visit summary for information provided to patient and family  Provisions for Follow-Up Care:  See after visit summary for information related to follow-up care and any pertinent home health orders  Disposition:   Home with VNA Services (Reminder: Complete face to face encounter)    Planned Readmission: no plan for readmission      Discharge Statement:  I spent 45 minutes discharging the patient  This time was spent on the day of discharge  I had direct contact with the patient on the day of discharge  Greater than 50% of the total time was spent examining patient, answering all patient questions, arranging and discussing plan of care with patient as well as directly providing post-discharge instructions  Additional time then spent on discharge activities  Discharge Medications:  See after visit summary for reconciled discharge medications provided to patient and/or family        **Please Note: This note may have been constructed using a voice recognition system**

## 2022-01-22 NOTE — DISCHARGE INSTRUCTIONS
Upper Endoscopic Gastrointestinal Ultrasonography   WHAT YOU NEED TO KNOW:   An upper gastrointestinal endoscopic ultrasound is done to look at the different parts of your upper gastrointestinal (GI) tract  The upper GI tract includes the esophagus, stomach, and duodenum (first part of the small intestine)  This procedure is used to help diagnose and treat diseases that affect the upper GI tract  DISCHARGE INSTRUCTIONS:   Seek care immediately if:   · You have sudden, severe abdominal pain  · You have problems swallowing  · You have a large amount of black, sticky bowel movements or blood in your bowel movements  · You have sudden trouble breathing  · You feel weak, lightheaded, or faint or your heart beats faster than normal for you  Contact your healthcare provider if:   · You have a fever and chills  · You have nausea or are vomiting  · Your abdomen is bloated or feels full and hard  · You have abdominal pain  · You have a large amount of black, sticky bowel movements or blood in your bowel movements  · You have not had a bowel movement for 3 days after your procedure  · You have rash or hives  · You lose your appetite, your skin feels itchy, and your skin turns yellow  · You have questions or concerns about your procedure  Self-care:   ·      Rest when you feel it is needed  You may be drowsy for up to 24 hours after your procedure  Return to your daily activities as directed  ·       Ask when you can eat regular foods  Healthy foods include fruits, vegetables, whole-grain breads, low-fat dairy products, beans, lean meats, and fish  ·       Relieve a sore throat with ice chips, liquids, or lozenges as directed  Follow up with your healthcare provider as directed: Write down your questions so you remember to ask them during your visits        If you take a blood thinner, please review the specific instructions from your endoscopist about when you should resume it  These can be found in the Recommendation and Your Medication list sections of this After Visit Summary  GI (Gastrointestinal) Soft Diet   WHAT YOU NEED TO KNOW:   A GI soft diet is prescribed by your healthcare provider to allow your intestines (bowels) to heal  Your bowels may need it before a procedure, after surgery, or because of a medical condition  The food in a GI soft diet keeps your bowels from working too hard  The diet gives you nutrition while allowing your bowels time to heal or rest    DISCHARGE INSTRUCTIONS:   Foods to eat on a GI soft diet:  Your healthcare provider may tell you to keep your fiber intake to less than 10 grams each day  · Grains:  Choose grains that have less than 2 grams of fiber in each serving  Examples include the following:     ? Cream of wheat and finely ground grits    ? Dry cereal made from rice     ? White bread, white pasta, and white rice    ? Crackers, bagels, and rolls made from white or refined flour    · Fruits and vegetables:      ? Canned and well-cooked fruit without skins or seeds, and juice without pulp    ? Ripe bananas and soft melon    ? Canned and well-cooked vegetables without skins or seeds, and strained vegetable juice    ? Potatoes without skin    · Dairy:     ? Cow's milk, lactose-free milk, soy milk, and rice milk    ? Cottage cheese and yogurt without nuts, fruit, or granola    · Protein:      ? Eggs, fish, and tender, well-cooked poultry (such as chicken and turkey) and beef     ? Tofu and smooth peanut butter    Foods to avoid:   The following foods are hard to digest:  · Breads, cereals, crackers, and pasta made with whole wheat or whole grains (such as whole oats)    · [employer], wild rice, quinoa, kasha, and barley    · All fresh fruit with skin, except banana and melons     · Dried fruits and fruit juice with pulp    · Canned and fresh pineapple    · Raw vegetables    · Nuts, seeds, and popcorn    · Beans, nuts, peas, and lentils    · Tough meats    · Coconut and avocado    What else you need to know:  A GI soft diet can decrease the amount of bowel movements you have  Drink liquids as directed to avoid constipation  Ask how much liquid to drink each day and which liquids are best for you  Do not  drink liquids with your meal  Wait until 30 minutes after your meal to drink liquids  © Copyright [employer] 2021 Information is for End User's use only and may not be sold, redistributed or otherwise used for commercial purposes  All illustrations and images included in CareNotes® are the copyrighted property of [employer]  or [facility] Shannon Yip   The above information is an  only  It is not intended as medical advice for individual conditions or treatments  Talk to your doctor, nurse or pharmacist before following any medical regimen to see if it is safe and effective for you

## 2022-01-22 NOTE — CASE MANAGEMENT
Case Management Discharge Planning Note    Patient name Vitaliy Zeng  Location [facility] 308/[facility] 461-07 MRN [id number]  : 1945 Date 2022       Current Admission Date: 2022  Current Admission Diagnosis: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 [facility])   Patient Active Problem List    Diagnosis Date Noted    Anemia 2022    Stage 3 chronic kidney disease ([facility] [address] [facility] ) 2022    Ischemic cardiomyopathy 2022    History of PTCA 2022    GERD (gastroesophageal reflux disease)     Pancreatic mass 2022    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    Pure hypercholesterolemia     Primary hypertension     Pain of upper abdomen 2021    Change in bowel habit 2021    Prostate cancer ([place] [address] ) 2021    BRCA gene mutation positive in male 2021    Coronary artery disease involving native coronary artery of native heart without angina pectoris 2021      LOS (days): 1  Geometric Mean LOS (GMLOS) (days):   Days to GMLOS:     OBJECTIVE:  Risk of Unplanned Readmission Score: 13         Current admission status: Inpatient   Preferred Pharmacy:   [id number], [address] [address]  Phone: [phone] Fax: [phone]    Primary Care Provider: Wesley Santiago DO    Primary Insurance: MEDICARE  Secondary Insurance: BLUE CROSS    DISCHARGE DETAILS:    Discharge planning discussed with[de-identified] pt  Freedom of Choice: Yes  Comments - Freedom of Choice: referrals to VNA sent per pt's request     Were Treatment Team discharge recommendations reviewed with patient/caregiver?: Yes  Did patient/caregiver verbalize understanding of patient care needs?: Yes  Were patient/caregiver advised of the risks associated with not following Treatment Team discharge recommendations?: Yes    Requested  curated.[address]         Is the patient interested in [facility] at discharge?: Yes  [address] requested[de-identified] Nursing  Home Health Agency Name[de-identified] Other  [address] Provider[de-identified] PCP  Home Health Services Needed[de-identified] Evaluate Functional Status and Safety,Other (comment) (Monitor vitals, med teaching)  Homebound Criteria Met[de-identified] Requires the Assistance of Another Person for Safe Ambulation or to Leave the Home  Supporting Clincal Findings[de-identified] Limited Endurance    Other Referral/Resources/Interventions Provided:  Interventions: [facility]

## 2022-01-22 NOTE — UTILIZATION REVIEW
Please see initial review completed on 1/20/22 by VICKY Parsons RN     1/21/22 Palliative Care Consult  Plan:  #symptom management  #cancer-related pain   - continue oxy-IR 2 5 mg PO Q4H PRN [mod pain] or 5 mg PO Q4H PRN [severe pain]   - continue hydromorphone 0 2 mg IV Q3H PRN [breakthrough pain]              - OME usage yesterday: 38 5 mg  Reports adequate pain management with current regimen  Rx sent to the pharmacy for fill to ensure ready upon discharge  Tentative plan for discharge to home tomorrow  Will follow up with OP [facility] clinic in [facility]      #insomnia   - continue temazepam 7 5 mg PO QHS PRN     #OIC   - continue docusate 100 mg PO BID PRN   - continue lubiprostone 24 mcg BID with meals   - add senna 1 tab PO QHS PRN     #nausea   - continue ondansetron 4 mg IV Q6H PRN  Sent Rx for PO ondansetron to pharmacy      #goals of care   - introduced Palliative and Supportive Care   - confirmed Full Code, Level 1   - no prior AD/LW completed, 5-Wishes provided for review   - awaiting biopsy results with plan to follow up with OP oncology for treatment plan options   - scheduled second opinion visit with [facility] end of month   - will continue to closely follow up with OP [facility] clinic       Date 1/22/22 Day 2   Discharged to home w/ Home health care  Network Utilization Review Department  ATTENTION: Please call with any questions or concerns to [phone] and carefully listen to the prompts so that you are directed to the right person  All voicemails are confidential   [facility] all requests for admission clinical reviews, approved or denied determinations and any other requests to dedicated fax number below belonging to the campus where the patient is receiving treatment   List of dedicated fax numbers for the Facilities:  [address] DENIALS (Administrative/Medical Necessity) [phone]   [address] (Maternity/NICU/Pediatrics) [phone] [address] [facility] [place] [phone]   [address] [address] [place] [address] Darlene Lynn [address] [phone]   [address]  [phone]

## 2022-01-25 NOTE — UTILIZATION REVIEW
Notification of Discharge   This is a Notification of Discharge from our facility [address]  Please be advised that this patient has been discharge from our facility  Below you will find the admission and discharge date and time including the patients disposition  UTILIZATION REVIEW CONTACT:  Lauri Navarro  Utilization   Network Utilization Review Department  Phone: [phone] x carefully listen to the prompts  All voicemails are confidential   Email: [email or web address]     PHYSICIAN ADVISORY SERVICES:  FOR [id number] REVIEW - MEDICAL NECESSITY DENIAL  Phone: [phone]  Fax: [phone]  Email: [email or web address]     PRESENTATION DATE: 1/19/2022  9:12 PM  OBERVATION ADMISSION DATE:   INPATIENT ADMISSION DATE: 1/21/22  9:55 AM   DISCHARGE DATE: 1/22/2022  3:26 PM  DISPOSITION: Home with [place] with [address] [place] [address] Road INFORMATION:  Send all requests for admission clinical reviews, approved or denied determinations and any other requests to dedicated fax number below belonging to the campus where the patient is receiving treatment   List of dedicated fax numbers:  [address] DENIALS (Administrative/Medical Necessity) [phone]   [address] (Maternity/NICU/Pediatrics) [phone]   Madelyn Flatten [phone]   Vishal Curran [phone]   Cee Pavon [phone]   2000 [facility] [address] [place] [address],[address] [phone]   [facility]  [phone]   [address] [place] [address], [facility]  [address] [phone]

## 2022-01-25 NOTE — TELEPHONE ENCOUNTER
Patients GI provider:  New Pt    Number to return call: [phone]    Reason for call: Pt called stating that he was recently discharged from the hospital and was told to follow up with Dr Sybil Ibarra  Pt is a [facility] pt  Dr Jarrell Boone first available is 4/1/22 and pt requested to speak to a nurse to see if he can wait that long to see the doctor or should he be seen earlier than April       Scheduled procedure/appointment date if applicable: Apt/procedure NA

## 2022-01-25 NOTE — TELEPHONE ENCOUNTER
Prior Authorization needed for OXYCODONE HCI 5MG     KEY#[id number]      Pharmacy:[facility] #[id number] [phone]

## 2022-01-25 NOTE — TELEPHONE ENCOUNTER
This nurse submitted an urgent PA request through [facility] for the oxycodone to [facility]  Awaiting determination

## 2022-01-25 NOTE — TELEPHONE ENCOUNTER
Patient seen previously by Dr Maurice Licona at [facility], during most recent hospitalization 1/19-1/22 seen by Dr Jordon Mendez    History of pancreatic malignancy with local mets, obstructive jaundice, duodenal narrowing, s/p EUS and ERCP    Patient was offered appt in April but wants to ensure there is no need to be seen sooner  Dr Jordon Mendez, please advise, how soon should patient follow up in office?

## 2022-01-26 NOTE — PHYSICIAN ADVISOR
Current patient class: Inpatient  The patient is currently on Hospital Day: 4 at [address] [place] [address]      The patient was admitted to the hospital at  9:55 AM on 1/21/22 for the following diagnosis:  Abdominal pain [R10 9]  Pancreatic mass [K86 89]  Obstructive jaundice [K83 1]       There was documentation in the medical record of an expected length of stay of at least 2 midnights  The patient was therefore expected to satisfy the 2 midnight benchmark and given the 2 midnight presumption was appropriate for INPATIENT ADMISSION  Given this expectation of a satisfying stay, CMS instructs us that the patient is most often appropriate for inpatient admission under part A provided medical necessity is documented in the chart  After review of the relevant documentation, labs, vital signs and test results, the patient is appropriate for INPATIENT ADMISSION  Admission to the hospital as an inpatient is a complex decision making process which requires the practitioner to consider the patients presenting complaint, history and physical examination and all relevant testing  With this in mind, in this case, the patient was deemed appropriate for INPATIENT ADMISSION  After review of the documentation and testing available at the time of the admission, I concur with this clinical determination of medical necessity  For the reason noted, the patient was discharged before reaching 2 midnights as an inpatient  Rationale is as follows: The patient is a 67 yrs old Male who presented to the ED at 1/19/2022  9:12 PM with a chief complaint of Abdominal Pain (dx with stage 4 pancreatic CA and blockage in bile duct  told to come in today to be admitted for surgery  )  Patient came in with obstructive jaundice  The patient went for GI procedure with stent  On the day of 1/21/2022 the patient was still on a clear liquid diet being slowly advanced    Palliative care consultation was also requested  Given the above the patient did cross the 2 midnight benchmark as set by Medicare and is inpatient status appropriate  The patients vitals on arrival were   ED Triage Vitals [01/19/22 2046]   Temperature Pulse Respirations Blood Pressure SpO2   98 9 °F (37 2 °C) 88 22 96/60 96 %      Temp Source Heart Rate Source Patient Position - Orthostatic VS BP Location FiO2 (%)   Tympanic Monitor Sitting Left arm --      Pain Score       5           Past Medical History:   Diagnosis Date    CAD (coronary artery disease)     GERD (gastroesophageal reflux disease)     Hyperlipidemia     Hypertension     [facility] [address] [facility]     Prostate cancer ([facility] [address] )      Past Surgical History:   Procedure Laterality Date    CHOLECYSTECTOMY      CORONARY ANGIOPLASTY WITH STENT PLACEMENT             Consults have been placed to:   IP CONSULT TO GASTROENTEROLOGY  IP CONSULT TO ONCOLOGY  IP CONSULT TO PALLIATIVE CARE    Vitals:    01/21/22 1951 01/21/22 2121 01/22/22 0631 01/22/22 0745   BP:  120/78 138/66 143/75   BP Location:  Right arm  Right arm   Pulse:  78 75 66   Resp:  16  16   Temp:  98 6 °F (37 °C)  98 4 °F (36 9 °C)   TempSrc:  Oral  Oral   SpO2: 93% 96% 92% 92%   Height:           Most recent labs:    No results for input(s): WBC, HGB, HCT, PLT, K, NA, CALCIUM, BUN, CREATININE, LIPASE, AMYLASE, INR, TROPONINI, CKTOTAL, AST, ALT, ALKPHOS, BILITOT in the last 72 hours  Scheduled Meds:  Continuous Infusions:No current facility-administered medications for this encounter  PRN Meds:      Surgical procedures (if appropriate):

## 2022-01-27 PROBLEM — Z51.5 PALLIATIVE CARE PATIENT: Status: ACTIVE | Noted: 2022-01-01

## 2022-01-27 NOTE — PATIENT INSTRUCTIONS
Fredrik Spatz, it was a pleasure taking care of you! We are prioritizing meds to help with a bowel movement, then we can look at other things including loss of appetite and insomnia  Please take your blood pressure every day  If the top number is below 100mmHg, consider not taking Lisinopril that day  Please call with any questions or concerns

## 2022-01-27 NOTE — PROGRESS NOTES
[address] [address] 67 y o  male [phone]    Assessment & Plan  Problem List Items Addressed This Visit        Genitourinary    Prostate cancer ([address] )    Relevant Medications    oxyCODONE (ROXICODONE) 5 immediate release tablet    ondansetron (ZOFRAN) 4 mg tablet       Other    BRCA gene mutation positive in male    Pancreatic mass    Relevant Medications    oxyCODONE (ROXICODONE) 5 immediate release tablet    ondansetron (ZOFRAN) 4 mg tablet    Palliative care patient    Relevant Medications    oxyCODONE (ROXICODONE) 5 immediate release tablet    Artificial Saliva (SalivaMAX) PACK    ondansetron (ZOFRAN) 4 mg tablet    docusate sodium (COLACE) 100 mg capsule      Other Visit Diagnoses     Cancer related pain    -  Primary    Relevant Medications    oxyCODONE (ROXICODONE) 5 immediate release tablet    Other Relevant Orders    Comprehensive metabolic panel    Chronic idiopathic constipation        Abnormal weight loss        Relevant Orders    Comprehensive metabolic panel    Goals of care, counseling/discussion        Anorexia        Generalized abdominal pain        Primary insomnia        Drug-induced constipation        Relevant Medications    bisacodyl (DULCOLAX) 5 mg EC tablet    bisacodyl (DULCOLAX) 10 mg suppository    lactulose (CHRONULAC) 10 g/15 mL solution    docusate sodium (COLACE) 100 mg capsule    Nausea        Relevant Medications    ondansetron (ZOFRAN) 4 mg tablet          Plan:  - increased OxyIR 7 5 mg Q4H PRN  - refilled Zofran  - Sent artifical saliva  - Continue Senna  - Reviewed bowel regimen   - discussed signs of concern for intestinal obstruction  - encouraged daily BP checks as BP low  - Call with any questions  - consider mirtazapine at next appointment for appetite, sleep   - RTC 3 weeks     - Counseling on health screening and disease prevention, COVID-19 specific (CPT V65 49)  -ACP - not addressed this visit     Medications adjusted this encounter:  Requested Prescriptions     Signed Prescriptions Disp Refills    bisacodyl (DULCOLAX) 5 mg EC tablet 14 tablet 0     Sig: Take 1 tablet (5 mg total) by mouth daily as needed for constipation    bisacodyl (DULCOLAX) 10 mg suppository 12 suppository 0     Sig: Insert 1 suppository (10 mg total) into the rectum daily    oxyCODONE (ROXICODONE) 5 immediate release tablet 120 tablet 0     Sig: Take 1 5 tablets (7 5 mg total) by mouth every 4 (four) hours as needed for severe pain Max Daily Amount: 45 mg    Artificial Saliva (SalivaMAX) PACK 30 each 3     Sig: Apply 1 each to the mouth or throat every 4 (four) hours as needed (dry mouth)    lactulose (CHRONULAC) 10 g/15 mL solution 240 mL 0     Sig: Take 15 mL (10 g total) by mouth 2 (two) times a day as needed (refractory constipation) Take after two days only if constipation is refractory to other agents   ondansetron (ZOFRAN) 4 mg tablet 20 tablet 0     Sig: Take 1 tablet (4 mg total) by mouth every 8 (eight) hours as needed for nausea or vomiting    docusate sodium (COLACE) 100 mg capsule 60 capsule 1     Sig: Take 1 capsule (100 mg total) by mouth 2 (two) times a day as needed for constipation     Orders Placed This Encounter   Procedures    Comprehensive metabolic panel     Medications Discontinued During This Encounter   Medication Reason    Sod Picosulfate-Mag Ox-Cit Acd (Clenpiq) 10-3 5-12 MG-GM -GM/160ML SOLN Therapy completed    oxyCODONE (ROXICODONE) 5 immediate release tablet Reorder    ondansetron (ZOFRAN) 4 mg tablet Reorder         João Green was seen today for symptoms and planning cares related to above illnesses  I have reviewed the patient's controlled substance dispensing history in the Prescription Drug Monitoring Program in compliance with the [facility] regulations before prescribing any controlled substances    01/22/2022  1   01/21/2022  Oxycodone Hcl (Ir) 5 MG Tablet    30 00  5 [facility]   [id number]   Pen (0296)   0  45 00 MME  Private Pay   PA         They are invited to continue to follow with us  If there are questions or concerns, please contact us through our clinic/answering service 24 hours a day, seven days a week  Anuja Painting PA-C  [facility]        Visit Information    Accompanied By: Family member, daughter Paulina Mansfield of History: Self    History Limitations: None       History of Present Illness      Madisyn Ireland is a 67 y o  male who presents in follow up of symptoms related to pancreatic cancer with liver Mets  Pertinent issues include: symptom management, pain, neoplasm related, nausea, fatigue, assessment of goals of care, disease process education and discussion of prognosis, advance care planning    Franklin Berg has new diagnosis of pancreatic cancer  His gastroenterologist ordered a CT scan which revealed pancreatic mass with liver and retroperitoneal mets  At that time, Franklin Berg was referred to the hospital for intervention related to obstructive jaundice  He was hospitalized at [facility] from 1/19/22 - 1/22/22  A PSC consult met with Franklin Berg during this hospitalization to assist with pain management and to introduce goals of care  At that time, Franklin Berg was started on Oxy IR 2 5 - 5mg Q4H PRN moderate to severe pain  For insomnia, he received temazepam 7 5 mg QHS PRN  For constipation, he received docusate 100MG PO BID PRN, lubiprostone 24 mcg BID with meals, and senna 1 tab PO QHS PRN  For nausea, he received PO ondansetron  In the hospital, goals of care were introduced  At that time, Franklin Berg confirmed he would like to be full code, level 1  He was provided with 5 Wishes document to review  At that time, he was awaiting biopsy results of his pancreas  He has an appointment scheduled with Dr Bee Hastings on 2/1/2022  Franklin Berg has also scheduled a second opinion with Anabella  In the office today, Franklin Berg presents with his daughter Мария Velazquez  He has multiple complaints  His main complaint is refractory constipation  Per Jg Lopez, he has not had a bowel movement in approximately 2 weeks  He denies vomiting or inability to tolerate p o  intake  He endorses passing gas  He states he is taking multiple senna tablets throughout the day combined with lubiprostone BID  Jg Lopez states he is not interested in [place]  Jg Lopez expresses interest in taking Dulcolax  We discussed adding Dulcolax and diminishing senna  We discussed taking a suppository  Will add Colace, which he has not been taking  We also discussed adding lactulose solution if he continues to have refractory constipation  Scripts adjusted accordingly  Jg Lopez also endorses fatigue and increased shortness of breath  He denies fevers or chills  He denies leg edema  He denies cough or sputum production  I suspect severe constipation with pancreatic mass may be contributing to his shortness of breath  Will prioritize having a bowel movement  Jg Lopez states his appetite is limited  He has been taking boost nutritional supplements  He states his gastroenterologist has placed him on a fiber restricted soft diet, and he is not comfortable taking more than a liquid diet at the time due to severe constipation  He does have mild nausea which is relieved with Zofran  Refill sent at this time  Regarding Wally's pain, he states that the oxycodone 5mg only minimally alleviates his pain  He states he takes it 3 times daily  We discussed increasing dosing to 7 5mg Q 4-6H PRN and Jg Lopez is agreeable  Discussed his concerns regarding tolerance and explained possibility for future opioid rotation should this develop  Jg Lopez was noted to have low blood pressure during the intake  I rechecked his blood pressure at the end of the visit to confirm  Jg Lopez states he has a BP cuff at home  I encouraged him to take his blood pressure daily    We discussed values of concern and when to hold his lisinopril and call his cardiologist     At this time, Wally's goal is to initiate cancer treatments  Further advance care planning not addressed at this visit  Recommend close follow-up to monitor Wally's symptoms, approximately 3 weeks  Encouraged however to call with any questions or concerns  Social:   Niranjan Bravo,  45 years] [phone]   - two adult children              - John Juarez [phone]              - Malena Anderson, resides in [facility] [address]   - four grandchildren [aged 10 + 6 (twins], 9, and 8]   - resides with spouse, currently with 24/7 paid care giver support   - PhD in organic chemistry, retired, previous work with [employer] x 22 years        Past medical, surgical, social, and family histories are reviewed and pertinent updates are made  Review of Systems   Constitutional: Positive for decreased appetite and weight loss  Negative for chills, night sweats and weight gain  HENT: Negative for congestion  Cardiovascular: Negative for chest pain, leg swelling and near-syncope  Respiratory: Positive for shortness of breath  Negative for cough, hemoptysis and sleep disturbances due to breathing  Gastrointestinal: Positive for bloating, abdominal pain, anorexia, constipation, flatus and nausea  Negative for vomiting  Genitourinary: Negative for bladder incontinence  Neurological: Positive for weakness  Negative for headaches  Psychiatric/Behavioral: Negative for altered mental status  The patient has insomnia  Vital Signs    BP (!) 88/66 (BP Location: Right arm, Patient Position: Sitting, Cuff Size: Standard)   Pulse 88   Temp (!) 95 8 °F (35 4 °C) (Temporal)   Resp 16   Ht 5' 8" (1 727 m)   Wt 87 2 kg (192 lb 3 2 oz)   SpO2 98%   BMI 29 22 kg/m²     Physical Exam and Objective Data  Physical Exam  Vitals and nursing note reviewed  Constitutional:       General: He is not in acute distress  Appearance: He is well-developed  He is obese   He is ill-appearing (chronically )  He is not toxic-appearing  HENT:      Head: Normocephalic and atraumatic  Eyes:      Conjunctiva/sclera: Conjunctivae normal    Cardiovascular:      Rate and Rhythm: Normal rate and regular rhythm  Heart sounds: No murmur heard  Pulmonary:      Effort: Pulmonary effort is normal  No respiratory distress  Musculoskeletal:         General: No swelling  Cervical back: Neck supple  Skin:     General: Skin is warm and dry  Coloration: Skin is pale  Neurological:      Mental Status: He is alert and oriented to person, place, and time  Radiology and Laboratory:  I personally reviewed and interpreted the following results:  CT imaging of abdomen and pelvis    45+ minutes was spent face to face with Thomasena Prader and his daughter Cindy Pelletier with greater than 50% of the time spent in counseling or coordination of care including discussions of risks and benefits of treatment, treatment instructions, follow up requirements, compliance with treatment regimen and symptom management, signs of concern for possible developing bowel obstruction, time spent answering questions extensively  Additional time was also spent in discussing coronavirus vaccine indications, availability, and logistical challenges  All of the patient's or agent's questions were answered during this discussion

## 2022-01-31 NOTE — TELEPHONE ENCOUNTER
Prior Authorization needed for Ondansetron 4 mg     [address]: [facility]

## 2022-02-01 PROBLEM — T45.1X5A CHEMOTHERAPY INDUCED NEUTROPENIA (HCC): Status: ACTIVE | Noted: 2022-01-01

## 2022-02-01 PROBLEM — C25.0 MALIGNANT NEOPLASM OF HEAD OF PANCREAS (HCC): Status: ACTIVE | Noted: 2022-01-01

## 2022-02-01 PROBLEM — D70.1 CHEMOTHERAPY INDUCED NEUTROPENIA (HCC): Status: ACTIVE | Noted: 2022-01-01

## 2022-02-01 NOTE — TELEPHONE ENCOUNTER
Saliva max  A submitted via CMM  Await response  Per pharmacist Out of Pocket cost   $239  30 packets/ 5 days    Ondansetron  Cancelled Prior auth  Out of pocket cost $1 63  For 16 tablets / 5 days  Attempt to speak to pt to update on status of each  Per spouse pt not home and did not want this nurse to leave a message  Will call in about 1 hour  ( after 300pm )

## 2022-02-01 NOTE — H&P (VIEW-ONLY)
Hematology/Oncology Outpatient Follow- up Note  Aloysius Kanner 67 y o  male MRN: @ Encounter: [id number]        Date:  2/1/2022    Presenting Complaint/Diagnosis :       Metastatic pancreatic cancer  HPI:      The patient is a pleasant 58-year-old male with a newly diagnosed 6 4 x 5 7 x 4 9 cm obstructing pancreatic head mass with internal necrosis highly worrisome for primary pancreatic neoplasm with multiple liver metastases and retroperitoneal adenopathy suggestive of metastatic disease  There was also diffusely mottled appearance of pelvic osseous structures from prior prostate radiation on imaging although metastatic disease could not be ruled out  The patient had an EUS with biopsy of both the liver lesion and the pancreatic mass both of which revealed adenocarcinoma consistent with metastatic pancreatic cancer  The patient was referred to see us for further evaluation  He was evaluated in the hospital when he was admitted with jaundice and elevated liver function tests which are improving over the last 1 and half weeks  The patient was seen at the [address] [place] [address] and they have recommended gemcitabine and oxaliplatin which is a reasonable regimen for this  Modified FOLFOX 6 is also a possibility but they have him set up for gemcitabine and Abraxane end of this week or next week  I would dose reduce the oxaliplatin to 85 milligrams/meter subcutaneously based on his age and performance status along with the slight abnormality in his creatinine  He is very constipated  He has not gone to the bathroom for 4 weeks according to him  He did get more laxatives i e  lactulose yesterday and is going to take it when he gets home today  Apart from the constipation he has no appetite  Has not been eating very well  Seems to be a little dehydrated which might explain his elevated creatinine  He states he will be making an effort to eat and drink better    He states his main concern right now is having a bowel movement so he will be starting lactulose today  The rest of his 14 point review of systems today was negative  Previous Hematologic/ Oncologic History:    Workup    Current Hematologic/ Oncologic Treatment:    Workup    Test Results:    Imaging: ERCP    Result Date: 1/20/2022  Narrative: [facility] [address] [phone] DATE OF SERVICE: 1/20/22 PHYSICIAN(S): Esteban Singh MD Proceduralist Jacki Johns DO Fellow INDICATION: Pancreatic mass, Obstructive jaundice POST-OP DIAGNOSIS: See the impression below  PREPROCEDURE: Informed consent was obtained for the procedure, including sedation  Risks of perforation, hemorrhage, adverse drug reaction and aspiration were discussed  The patient was placed in the left lateral decubitus position  Patient was explained about the risks and benefits of the procedure  Risks including but not limited to bleeding, infection, and perforation were explained in detail  Also explained about less than 100% sensitivity with the exam and other alternatives  DETAILS OF PROCEDURE: Patient was taken to the procedure room where a time out was performed to confirm correct patient and correct procedure  The patient underwent general anesthesia, which was administered by an anesthesia professional  The patient's blood pressure, heart rate, level of consciousness, respirations and oxygen were monitored throughout the procedure  Clinical intention was achieved  The patient experienced no blood loss  The procedure was not difficult  The patient tolerated the procedure well   ANESTHESIA INFORMATION: ASA: III Anesthesia Type: General MEDICATIONS: indomethacin (INDOCIN) rectal suppository 0 *  *Some administrations are not included in total indomethacin (INDOCIN) rectal suppository 100 mg (Totals for administrations occurring from 1345 to 1542 on 01/20/22) FINDINGS: The esophagus appeared normal  The stomach appeared normal  The duodenal bulb appeared normal  Stricture in the 1st part of the duodenum and 2nd part of the duodenum  The stricture was just proximal to the ampulla  Performed balloon dilation in the 1st part of the duodenum  The dilator was expanded from 12 mm starting size to 15 mm ending size  After dilation the side-viewing endoscope could traverse the area of the narrowing  The ampulla was visualized  Deeply cannulated the common bile duct after 3 attempts using a traction sphincterotome  Used guidewire  Cannulation was difficult  Injected contrast  An irregular narrowing was seen in the distal bile duct  This measured approximately 3-4 cm  Upstream the duct was dilated  Performed small biliary sphincterotomy using a sphincterotome  Placed metal, covered stent with length of 6 cm and diameter of 10 mm in the common bile duct  After stent placement there was good bile drainage  SPECIMENS: ID Type Source Tests Collected by Time Destination 1 : pancreatic head mass FNA FNA FINE NEEDLE ASPIRATION Saima Francisco MD 1/20/2022  2:12 PM  2 : Liver lesion FNA FNA FINE NEEDLE ASPIRATION Saima Francisco MD 1/20/2022  2:29 PM       Impression: Narrowing seen at the duodenal sweep from the external compression from the tumor  Successful ERCP after duodenal sweep dilation  A metal stent was placed in the area of distal bile duct narrowing  RECOMMENDATION: Follow up with me in clinic Follow-up LFTs  If he is unable to tolerate diet then we may consider a duodenal stent placement verses a endoscopically done gastrojejunostomy  Surgical gastrojejunostomy is certainly an option  ATTENDING ATTESTATION:  I was present throughout the entire procedure from insertion to complete withdrawal of the scope  I performed all interventions myself or oversaw the fellow     Saima Francisco MD    Endoscopic ultrasonography, GI (Upper)    Result Date: 1/20/2022  Narrative: [address] [place] [address] Endoscopy [address] [facility] [address] DATE OF SERVICE: 1/20/22 PHYSICIAN(S): Americo Florian MD Proceduralist Shabana Logan DO Fellow INDICATION: Pancreatic mass, Obstructive jaundice POST-OP DIAGNOSIS: See the impression below  PREPROCEDURE: Informed consent was obtained for the procedure, including sedation  Risks of perforation, hemorrhage, adverse drug reaction and aspiration were discussed  The patient was placed in the left lateral decubitus position  Patient was explained about the risks and benefits of the procedure  Risks including but not limited to bleeding, infection, and perforation were explained in detail  Also explained about less than 100% sensitivity with the exam and other alternatives  DETAILS OF PROCEDURE: Patient was taken to the procedure room where a time out was performed to confirm correct patient and correct procedure  The patient underwent monitored anesthesia care, which was administered by an anesthesia professional  The patient's blood pressure, heart rate, oxygen, respirations, level of consciousness and ETCO2 were monitored throughout the procedure  The linear scope was introduced through the mouth and advanced to the second part of the duodenum  Retroflexion was performed in the cardia, fundus and incisura  The patient experienced no blood loss  The procedure was not difficult  The patient tolerated the procedure well  There were no apparent complications  ANESTHESIA INFORMATION: ASA: III Anesthesia Type: General MEDICATIONS: No administrations occurring from 1345 to 1441 on 01/20/22 FINDINGS: Edematous and erythematous mucosa in the duodenal bulb and 1st part of the duodenum  Narrowing the duodenal sweep  Dilated with CRE balloon dilator from 12 mm to 15 mm   The fundus of the stomach, body of the stomach, incisura, antrum and pylorus appeared normal  The upper third of the esophagus, middle third of the esophagus, lower third of the esophagus and GE junction appeared normal  Single irregular and hypoechoic mass measuring 42 mm x 47 mm with poorly defined and irregular margins in the head of the pancreas; 2 fine needle biopsy passes were taken with a 25 gauge Sharkcore needle using a transduodenal approach guided by Doppler and performed cytology analysis  Onsite cytologist was present and cytology results were preliminarily malignant Round, hypoechoic and heterogeneous peripancreatic nodes with well-defined margins, measuring 15 x 20 mm  With necrotic components Round and hypoechoic celiac nodes, measuring 12 x 12 mm Multiple round and hypoechoic masses measuring 22 mm x 15 mm with well-defined and smooth margins in the left lobe of the liver; 2 fine needle biopsy passes were taken with a 25 gauge Sharkcore needle using a transgastric approach guided by Doppler and performed cytology analysis  Onsite cytologist was present and cytology results were preliminarily malignant Appeared dilated and measured 12 mm in diameter Normal pancreatic duct which measured 3 3 mm  SPECIMENS: ID Type Source Tests Collected by Time Destination 1 : pancreatic head mass FNA FNA FINE NEEDLE ASPIRATION Angelina Stallings MD 1/20/2022  2:12 PM  2 : Liver lesion FNA FNA FINE NEEDLE ASPIRATION Angelina Stallings MD 1/20/2022  2:29 PM       Impression: EGD: Edematous mucosa with narrowing in the duodenal sweep  Traversable with the endoscope  Dilated with balloon dilator from 12 mm to 15 mm  Normal appearance of the stomach  Normal appearance of the duodenum  EUS: Large pancreatic head mass causing compression of the bile duct with surrounding enlarged celiac and peripancreatic lymph nodes  Performed FNB with onsite cytology demonstrated preliminarily malignant cells  Several liver lesions seen in left lobe of the liver suspicious for metastasis  Performed FNB with onsite cytology demonstrating preliminarily malignant cells  The bile duct was dilated to 12 mm  The PD appeared normal  Normal aorta and celiac take off  RECOMMENDATION: Await pathology results   Medical oncology consultation  Proceed with ERCP  Please contact the GI fellow on-call via [employer] with any questions or concerns  ATTENDING ATTESTATION:  I was present throughout the entire procedure from insertion to complete withdrawal of the scope  I performed all interventions myself or oversaw the fellow  DO YELITZA Jett ERCP biliary only    Result Date: 1/21/2022  Narrative: ERCP INDICATION:  Pancreatic mass  COMPARISON:  CT 1/19/2022  IMAGES:  7 FLUOROSCOPY TIME:   4:20 minutes CONTRAST: 9 mL of iohexol (OMNIPAQUE) FINDINGS: Fluoroscopic guidance was provided for performance of ERCP  BILIARY: Biliary dilatation is noted  Common duct stent was placed  Impression: Please see procedure report for further details  Workstation performed: [id number]     CT abdomen pelvis w contrast    Result Date: 1/19/2022  Narrative: CT ABDOMEN AND PELVIS WITH IV CONTRAST INDICATION:   R10 10: Upper abdominal pain, unspecified C61: Malignant neoplasm of prostate R82 998: Other abnormal findings in urine R63 4: Abnormal weight loss R53 83: Other fatigue R19 4: Change in bowel habit  COMPARISON:  None  TECHNIQUE:  CT examination of the abdomen and pelvis was performed  Axial, sagittal, and coronal 2D reformatted images were created from the source data and submitted for interpretation  Radiation dose length product (DLP) for this visit:  1215 76 mGy-cm   This examination, like all CT scans performed in the [facility], was performed utilizing techniques to minimize radiation dose exposure, including the use of iterative reconstruction and automated exposure control  IV Contrast:  100 mL of iohexol (OMNIPAQUE) Enteric Contrast:  Enteric contrast was administered   FINDINGS: ABDOMEN LOWER CHEST:  No clinically significant abnormality identified in the visualized lower chest  LIVER/BILIARY TREE: There are multiple hypoattenuating rounded masses scattered throughout the liver measuring up to 2 2 cm in the left hepatic lobe worrisome for metastases  Moderate intra and extrahepatic biliary ductal dilatation is seen  The common  bile duct is dilated measuring up to 1 5 cm  GALLBLADDER:  Absent  SPLEEN:  Unremarkable  PANCREAS:  There is a large 6 4 x 5 7 x 4 9 cm obstructing pancreatic head mass with low-attenuation center suggestive of necrosis  This mass is inseparable from the duodenum and abuts the main portal vein  Mild atrophy is seen of the pancreatic body and tail  ADRENAL GLANDS:  Unremarkable  KIDNEYS/URETERS:  Bilateral renal cysts measuring up to 12 4 cm on the left  No hydronephrosis  STOMACH AND BOWEL:  No evidence for bowel obstruction  APPENDIX:  No findings to suggest appendicitis  ABDOMINOPELVIC CAVITY:  No ascites  No pneumoperitoneum  Retroperitoneal lymphadenopathy is seen  For example, a left para-aortic lymph node measures 2 9 x 2 0 cm  There is right retrocrural lymphadenopathy with the largest node measuring 2 6 x 1 3 cm  Periportal lymphadenopathy measures up to 1 8 cm  VESSELS: The left renal vein is significantly compressed secondary to the pancreatic mass  Atherosclerotic disease  No abdominal aortic aneurysm  PELVIS REPRODUCTIVE ORGANS:  Prostate radiation treatment markers  URINARY BLADDER:  Unremarkable  ABDOMINAL WALL/INGUINAL REGIONS:  Tiny fat-containing umbilical hernia  OSSEOUS STRUCTURES:  No acute fracture  Mild diffusely mottled appearance of the pelvic osseous structures may be sequela of prior prostate radiation treatment therapy although metastatic disease cannot be excluded  Impression: 6 4 x 5 7 x 4 9 cm obstructing pancreatic head mass with internal necrosis highly worrisome for primary pancreatic neoplasm  Multiple liver masses and retroperitoneal lymphadenopathy suggestive of metastatic disease  Moderate intra- and extrahepatic biliary ductal dilatation secondary to obstruction   Mild diffusely mottled appearance of the pelvic osseous structures may be sequela of prior prostate radiation treatment therapy although metastatic disease cannot be excluded  The study was marked in [facility] for immediate notification  Workstation performed: [id number]       Labs:   Lab Results   Component Value Date    WBC 5 48 01/22/2022    HGB 10 2 (L) 01/22/2022    HCT 31 6 (L) 01/22/2022    MCV 96 01/22/2022     01/22/2022     Lab Results   Component Value Date    K 4 3 01/27/2022     01/27/2022    CO2 30 01/27/2022    BUN 20 01/27/2022    CREATININE 1 46 (H) 01/27/2022    GLUF 213 (H) 01/27/2022    CALCIUM 10 1 01/27/2022    CORRECTEDCA 10 7 (H) 01/27/2022    AST 46 (H) 01/27/2022     (H) 01/27/2022    ALKPHOS 212 (H) 01/27/2022    EGFR 45 01/27/2022       ROS: As stated in the history of present illness otherwise his 14 point review of systems today was negative        Active Problems:   Patient Active Problem List   Diagnosis    Pain of upper abdomen    Change in bowel habit    Prostate cancer ([facility] )    BRCA gene mutation positive in male    Coronary artery disease involving native coronary artery of native heart without angina pectoris    Pancreatic mass    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    Pure hypercholesterolemia    Primary hypertension    Anemia    Stage 3 chronic kidney disease ([facility]    Ischemic cardiomyopathy    History of PTCA    GERD (gastroesophageal reflux disease)    Palliative care patient       Past Medical History:   Past Medical History:   Diagnosis Date    CAD (coronary artery disease)     GERD (gastroesophageal reflux disease)     Hyperlipidemia     Hypertension     [facility]     Prostate cancer ([facility] )        Surgical History:   Past Surgical History:   Procedure Laterality Date    CHOLECYSTECTOMY      CORONARY ANGIOPLASTY WITH STENT PLACEMENT         Family History:    Family History   Problem Relation Age of Onset    Pancreatic cancer Mother     Dementia Father     Esophageal cancer Sister Cancer-related family history includes Esophageal cancer in his sister; Pancreatic cancer in his mother      Social History:   Social History     Socioeconomic History    Marital status: /Civil Union     Spouse name: Not on file    Number of children: Not on file    Years of education: Not on file    Highest education level: Not on file   Occupational History    Not on file   Tobacco Use    Smoking status: Never Smoker    Smokeless tobacco: Never Used   Vaping Use    Vaping Use: Never used   Substance and Sexual Activity    Alcohol use: Not Currently    Drug use: Never    Sexual activity: Not Currently   Other Topics Concern    Not on file   Social History Narrative    Not on file     Social Determinants of Health     Financial Resource Strain: Not on file   Food Insecurity: Not on file   Transportation Needs: Not on file   Physical Activity: Not on file   Stress: Not on file   Social Connections: Not on file   Intimate Partner Violence: Not on file   Housing Stability: Not on file       Current Medications:   Current Outpatient Medications   Medication Sig Dispense Refill    Artificial Saliva ([address]) PACK Apply 1 each to the mouth or throat every 4 (four) hours as needed (dry mouth) 30 each 3    aspirin (ECOTRIN LOW STRENGTH) 81 mg EC tablet Take 81 mg by mouth daily      bisacodyl (DULCOLAX) 10 mg suppository Insert 1 suppository (10 mg total) into the rectum daily 12 suppository 0    bisacodyl (DULCOLAX) 5 mg EC tablet Take 1 tablet (5 mg total) by mouth daily as needed for constipation 14 tablet 0    carvedilol (COREG) 12 5 mg tablet Take 1 tablet (12 5 mg total) by mouth 2 (two) times a day with meals (Patient taking differently: Take 12 5 mg by mouth in the morning  ) 30 tablet 0    cetirizine (ZyrTEC) 10 mg tablet Take 10 mg by mouth      docusate sodium (COLACE) 100 mg capsule Take 1 capsule (100 mg total) by mouth 2 (two) times a day as needed for constipation 60 capsule 1    lactulose (CHRONULAC) 10 g/15 mL solution Take 15 mL (10 g total) by mouth 2 (two) times a day as needed (refractory constipation) Take after two days only if constipation is refractory to other agents  240 mL 0    lisinopril (ZESTRIL) 10 mg tablet       ondansetron (ZOFRAN) 4 mg tablet Take 1 tablet (4 mg total) by mouth every 8 (eight) hours as needed for nausea or vomiting 20 tablet 0    oxyCODONE (ROXICODONE) 5 immediate release tablet Take 1 5 tablets (7 5 mg total) by mouth every 4 (four) hours as needed for severe pain Max Daily Amount: 45 mg 120 tablet 0    pantoprazole (PROTONIX) 20 mg tablet       senna (SENOKOT) 8 6 mg Take 1 tablet (8 6 mg total) by mouth daily at bedtime as needed for constipation 30 tablet 0    lubiprostone (AMITIZA) 24 mcg capsule Take 1 capsule (24 mcg total) by mouth 2 (two) times a day with meals (Patient not taking: Reported on 2/1/2022 ) 60 capsule 3     No current facility-administered medications for this visit  Allergies: No Known Allergies    Physical Exam:    Body surface area is 1 99 meters squared  Wt Readings from Last 3 Encounters:   02/01/22 85 3 kg (188 lb)   01/27/22 87 2 kg (192 lb 3 2 oz)   01/19/22 88 9 kg (196 lb)        Temp Readings from Last 3 Encounters:   02/01/22 (!) 96 8 °F (36 °C) (Temporal)   01/27/22 (!) 95 8 °F (35 4 °C) (Temporal)   01/22/22 98 4 °F (36 9 °C) (Oral)        BP Readings from Last 3 Encounters:   02/01/22 (!) 86/56   01/27/22 (!) 88/66   01/22/22 143/75         Pulse Readings from Last 3 Encounters:   02/01/22 84   01/27/22 88   01/22/22 66         Physical Exam     Constitutional   General appearance: No acute distress, well appearing and well nourished  Eyes   Conjunctiva and lids: No swelling, erythema or discharge  Pupils and irises: Equal, round and reactive to light      Ears, Nose, Mouth, and Throat   External inspection of ears and nose: Normal     Nasal mucosa, septum, and turbinates: Normal without edema or erythema  Oropharynx: Normal with no erythema, edema, exudate or lesions  Pulmonary   Respiratory effort: No increased work of breathing or signs of respiratory distress  Auscultation of lungs: Clear to auscultation  Cardiovascular   Palpation of heart: Normal PMI, no thrills  Auscultation of heart: Normal rate and rhythm, normal S1 and S2, without murmurs  Examination of extremities for edema and/or varicosities: Normal     Carotid pulses: Normal     Abdomen   Abdomen: Non-tender, no masses  Liver and spleen: No hepatomegaly or splenomegaly  Lymphatic   Palpation of lymph nodes in neck: No lymphadenopathy  Musculoskeletal   Gait and station: Normal     Digits and nails: Normal without clubbing or cyanosis  Inspection/palpation of joints, bones, and muscles: Normal     Skin   Skin and subcutaneous tissue: Normal without rashes or lesions  Neurologic   Cranial nerves: Cranial nerves 2-12 intact  Sensation: No sensory loss  Psychiatric   Orientation to person, place, and time: Normal     Mood and affect: Normal         Assessment / Plan:    The patient is a pleasant 58-year-old male with metastatic pancreatic cancer newly diagnosed  He was seen at the [facility] also and they have recommended gemcitabine and oxaliplatin  Gemcitabine will be at a 1000 milligrams/meter2 on days 1 and 8  Oxaliplatin will be 85 milligrams/meter2 on day 1  With Neulasta growth factor support on day number 9  He will get an Onpro injector on day 8 when he is here for his 2nd dose of gemcitabine  I went over the risks benefits and alternatives of the regimen  I explained the possible side effects to include but not limited to nausea, vomiting, diarrhea, mouth sores, low blood counts, risk of infection, hair loss, cold sensitivity, jaw claudication, neuropathy and even death  The patient is agreeable to proceeding    He may start at the [address] [place] [address] later this week and then come here next week for the 2nd dose i e  day 8  After which he will continue care at the [facility] office  We will set this up  I will see him back in a month  He will have started treatment by then  I will set him up to have a port placed with Interventional Radiology  I will see him back within a month  He will have started treatment  He will sign a consent form today  Goals and Barriers:  Current Goal:  Prolong Survival from metastatic pancreatic cancer  Barriers: None  Patient's Capacity to Self Care:  Patient  able to self care  Portions of the record may have been created with voice recognition software  Occasional wrong word or "sound a like" substitutions may have occurred due to the inherent limitations of voice recognition software  Read the chart carefully and recognize, using context, where substitutions have occurred

## 2022-02-01 NOTE — PROGRESS NOTES
Hematology/Oncology Outpatient Follow- up Note  Sidney Page 67 y o  male MRN: @ Encounter: [id number]        Date:  2/1/2022    Presenting Complaint/Diagnosis :       Metastatic pancreatic cancer  HPI:      The patient is a pleasant 70-year-old male with a newly diagnosed 6 4 x 5 7 x 4 9 cm obstructing pancreatic head mass with internal necrosis highly worrisome for primary pancreatic neoplasm with multiple liver metastases and retroperitoneal adenopathy suggestive of metastatic disease  There was also diffusely mottled appearance of pelvic osseous structures from prior prostate radiation on imaging although metastatic disease could not be ruled out  The patient had an EUS with biopsy of both the liver lesion and the pancreatic mass both of which revealed adenocarcinoma consistent with metastatic pancreatic cancer  The patient was referred to see us for further evaluation  He was evaluated in the hospital when he was admitted with jaundice and elevated liver function tests which are improving over the last 1 and half weeks  The patient was seen at the [address] [place] [address] and they have recommended gemcitabine and oxaliplatin which is a reasonable regimen for this  Modified FOLFOX 6 is also a possibility but they have him set up for gemcitabine and Abraxane end of this week or next week  I would dose reduce the oxaliplatin to 85 milligrams/meter subcutaneously based on his age and performance status along with the slight abnormality in his creatinine  He is very constipated  He has not gone to the bathroom for 4 weeks according to him  He did get more laxatives i e  lactulose yesterday and is going to take it when he gets home today  Apart from the constipation he has no appetite  Has not been eating very well  Seems to be a little dehydrated which might explain his elevated creatinine  He states he will be making an effort to eat and drink better    He states his main concern right now is having a bowel movement so he will be starting lactulose today  The rest of his 14 point review of systems today was negative  Previous Hematologic/ Oncologic History:    Workup    Current Hematologic/ Oncologic Treatment:    Workup    Test Results:    Imaging: ERCP    Result Date: 1/20/2022  Narrative: [address] [place] [address] Endoscopy [address] [phone] DATE OF SERVICE: 1/20/22 PHYSICIAN(S): Na Kahn MD Proceduralist Chato Perez DO Fellow INDICATION: Pancreatic mass, Obstructive jaundice POST-OP DIAGNOSIS: See the impression below  PREPROCEDURE: Informed consent was obtained for the procedure, including sedation  Risks of perforation, hemorrhage, adverse drug reaction and aspiration were discussed  The patient was placed in the left lateral decubitus position  Patient was explained about the risks and benefits of the procedure  Risks including but not limited to bleeding, infection, and perforation were explained in detail  Also explained about less than 100% sensitivity with the exam and other alternatives  DETAILS OF PROCEDURE: Patient was taken to the procedure room where a time out was performed to confirm correct patient and correct procedure  The patient underwent general anesthesia, which was administered by an anesthesia professional  The patient's blood pressure, heart rate, level of consciousness, respirations and oxygen were monitored throughout the procedure  Clinical intention was achieved  The patient experienced no blood loss  The procedure was not difficult  The patient tolerated the procedure well   ANESTHESIA INFORMATION: ASA: III Anesthesia Type: General MEDICATIONS: indomethacin (INDOCIN) rectal suppository 0 *  *Some administrations are not included in total indomethacin (INDOCIN) rectal suppository 100 mg (Totals for administrations occurring from 1345 to 1542 on 01/20/22) FINDINGS: The esophagus appeared normal  The stomach appeared normal  The duodenal bulb appeared normal  Stricture in the 1st part of the duodenum and 2nd part of the duodenum  The stricture was just proximal to the ampulla  Performed balloon dilation in the 1st part of the duodenum  The dilator was expanded from 12 mm starting size to 15 mm ending size  After dilation the side-viewing endoscope could traverse the area of the narrowing  The ampulla was visualized  Deeply cannulated the common bile duct after 3 attempts using a traction sphincterotome  Used guidewire  Cannulation was difficult  Injected contrast  An irregular narrowing was seen in the distal bile duct  This measured approximately 3-4 cm  Upstream the duct was dilated  Performed small biliary sphincterotomy using a sphincterotome  Placed metal, covered stent with length of 6 cm and diameter of 10 mm in the common bile duct  After stent placement there was good bile drainage  SPECIMENS: ID Type Source Tests Collected by Time Destination 1 : pancreatic head mass FNA FNA FINE NEEDLE ASPIRATION Americo Florian MD 1/20/2022  2:12 PM  2 : Liver lesion FNA FNA FINE NEEDLE ASPIRATION Americo Florian MD 1/20/2022  2:29 PM       Impression: Narrowing seen at the duodenal sweep from the external compression from the tumor  Successful ERCP after duodenal sweep dilation  A metal stent was placed in the area of distal bile duct narrowing  RECOMMENDATION: Follow up with me in clinic Follow-up LFTs  If he is unable to tolerate diet then we may consider a duodenal stent placement verses a endoscopically done gastrojejunostomy  Surgical gastrojejunostomy is certainly an option  ATTENDING ATTESTATION:  I was present throughout the entire procedure from insertion to complete withdrawal of the scope  I performed all interventions myself or oversaw the fellow     Americo Florian MD    Endoscopic ultrasonography, GI (Upper)    Result Date: 1/20/2022  Narrative: [address] [place] [address] [address]oscopy [address] [facility] [address] DATE OF SERVICE: 1/20/22 PHYSICIAN(S): Renee Bhakta MD Proceduralist Campos Brewer DO Fellow INDICATION: Pancreatic mass, Obstructive jaundice POST-OP DIAGNOSIS: See the impression below  PREPROCEDURE: Informed consent was obtained for the procedure, including sedation  Risks of perforation, hemorrhage, adverse drug reaction and aspiration were discussed  The patient was placed in the left lateral decubitus position  Patient was explained about the risks and benefits of the procedure  Risks including but not limited to bleeding, infection, and perforation were explained in detail  Also explained about less than 100% sensitivity with the exam and other alternatives  DETAILS OF PROCEDURE: Patient was taken to the procedure room where a time out was performed to confirm correct patient and correct procedure  The patient underwent monitored anesthesia care, which was administered by an anesthesia professional  The patient's blood pressure, heart rate, oxygen, respirations, level of consciousness and ETCO2 were monitored throughout the procedure  The linear scope was introduced through the mouth and advanced to the second part of the duodenum  Retroflexion was performed in the cardia, fundus and incisura  The patient experienced no blood loss  The procedure was not difficult  The patient tolerated the procedure well  There were no apparent complications  ANESTHESIA INFORMATION: ASA: III Anesthesia Type: General MEDICATIONS: No administrations occurring from 1345 to 1441 on 01/20/22 FINDINGS: Edematous and erythematous mucosa in the duodenal bulb and 1st part of the duodenum  Narrowing the duodenal sweep  Dilated with CRE balloon dilator from 12 mm to 15 mm   The fundus of the stomach, body of the stomach, incisura, antrum and pylorus appeared normal  The upper third of the esophagus, middle third of the esophagus, lower third of the esophagus and GE junction appeared normal  Single irregular and hypoechoic mass measuring 42 mm x 47 mm with poorly defined and irregular margins in the head of the pancreas; 2 fine needle biopsy passes were taken with a 25 gauge Sharkcore needle using a transduodenal approach guided by Doppler and performed cytology analysis  Onsite cytologist was present and cytology results were preliminarily malignant Round, hypoechoic and heterogeneous peripancreatic nodes with well-defined margins, measuring 15 x 20 mm  With necrotic components Round and hypoechoic celiac nodes, measuring 12 x 12 mm Multiple round and hypoechoic masses measuring 22 mm x 15 mm with well-defined and smooth margins in the left lobe of the liver; 2 fine needle biopsy passes were taken with a 25 gauge Sharkcore needle using a transgastric approach guided by Doppler and performed cytology analysis  Onsite cytologist was present and cytology results were preliminarily malignant Appeared dilated and measured 12 mm in diameter Normal pancreatic duct which measured 3 3 mm  SPECIMENS: ID Type Source Tests Collected by Time Destination 1 : pancreatic head mass FNA FNA FINE NEEDLE ASPIRATION Inocencia Saravia MD 1/20/2022  2:12 PM  2 : Liver lesion FNA FNA FINE NEEDLE ASPIRATION Inocencia Saravia MD 1/20/2022  2:29 PM       Impression: EGD: Edematous mucosa with narrowing in the duodenal sweep  Traversable with the endoscope  Dilated with balloon dilator from 12 mm to 15 mm  Normal appearance of the stomach  Normal appearance of the duodenum  EUS: Large pancreatic head mass causing compression of the bile duct with surrounding enlarged celiac and peripancreatic lymph nodes  Performed FNB with onsite cytology demonstrated preliminarily malignant cells  Several liver lesions seen in left lobe of the liver suspicious for metastasis  Performed FNB with onsite cytology demonstrating preliminarily malignant cells  The bile duct was dilated to 12 mm  The PD appeared normal  Normal aorta and celiac take off  RECOMMENDATION: Await pathology results   Medical oncology consultation  Proceed with ERCP  Please contact the GI fellow on-call via [employer] with any questions or concerns  ATTENDING ATTESTATION:  I was present throughout the entire procedure from insertion to complete withdrawal of the scope  I performed all interventions myself or oversaw the fellow  DO YELITZA Jeffries ERCP biliary only    Result Date: 1/21/2022  Narrative: ERCP INDICATION:  Pancreatic mass  COMPARISON:  CT 1/19/2022  IMAGES:  7 FLUOROSCOPY TIME:   4:20 minutes CONTRAST: 9 mL of iohexol (OMNIPAQUE) FINDINGS: Fluoroscopic guidance was provided for performance of ERCP  BILIARY: Biliary dilatation is noted  Common duct stent was placed  Impression: Please see procedure report for further details  Workstation performed: [id number]     CT abdomen pelvis w contrast    Result Date: 1/19/2022  Narrative: CT ABDOMEN AND PELVIS WITH IV CONTRAST INDICATION:   R10 10: Upper abdominal pain, unspecified C61: Malignant neoplasm of prostate R82 998: Other abnormal findings in urine R63 4: Abnormal weight loss R53 83: Other fatigue R19 4: Change in bowel habit  COMPARISON:  None  TECHNIQUE:  CT examination of the abdomen and pelvis was performed  Axial, sagittal, and coronal 2D reformatted images were created from the source data and submitted for interpretation  Radiation dose length product (DLP) for this visit:  1215 76 mGy-cm   This examination, like all CT scans performed in the [facility], was performed utilizing techniques to minimize radiation dose exposure, including the use of iterative reconstruction and automated exposure control  IV Contrast:  100 mL of iohexol (OMNIPAQUE) Enteric Contrast:  Enteric contrast was administered   FINDINGS: ABDOMEN LOWER CHEST:  No clinically significant abnormality identified in the visualized lower chest  LIVER/BILIARY TREE: There are multiple hypoattenuating rounded masses scattered throughout the liver measuring up to 2 2 cm in the left hepatic lobe worrisome for metastases  Moderate intra and extrahepatic biliary ductal dilatation is seen  The common  bile duct is dilated measuring up to 1 5 cm  GALLBLADDER:  Absent  SPLEEN:  Unremarkable  PANCREAS:  There is a large 6 4 x 5 7 x 4 9 cm obstructing pancreatic head mass with low-attenuation center suggestive of necrosis  This mass is inseparable from the duodenum and abuts the main portal vein  Mild atrophy is seen of the pancreatic body and tail  ADRENAL GLANDS:  Unremarkable  KIDNEYS/URETERS:  Bilateral renal cysts measuring up to 12 4 cm on the left  No hydronephrosis  STOMACH AND BOWEL:  No evidence for bowel obstruction  APPENDIX:  No findings to suggest appendicitis  ABDOMINOPELVIC CAVITY:  No ascites  No pneumoperitoneum  Retroperitoneal lymphadenopathy is seen  For example, a left para-aortic lymph node measures 2 9 x 2 0 cm  There is right retrocrural lymphadenopathy with the largest node measuring 2 6 x 1 3 cm  Periportal lymphadenopathy measures up to 1 8 cm  VESSELS: The left renal vein is significantly compressed secondary to the pancreatic mass  Atherosclerotic disease  No abdominal aortic aneurysm  PELVIS REPRODUCTIVE ORGANS:  Prostate radiation treatment markers  URINARY BLADDER:  Unremarkable  ABDOMINAL WALL/INGUINAL REGIONS:  Tiny fat-containing umbilical hernia  OSSEOUS STRUCTURES:  No acute fracture  Mild diffusely mottled appearance of the pelvic osseous structures may be sequela of prior prostate radiation treatment therapy although metastatic disease cannot be excluded  Impression: 6 4 x 5 7 x 4 9 cm obstructing pancreatic head mass with internal necrosis highly worrisome for primary pancreatic neoplasm  Multiple liver masses and retroperitoneal lymphadenopathy suggestive of metastatic disease  Moderate intra- and extrahepatic biliary ductal dilatation secondary to obstruction   Mild diffusely mottled appearance of the pelvic osseous structures may be sequela of prior prostate radiation treatment therapy although metastatic disease cannot be excluded  The study was marked in [facility] for immediate notification  Workstation performed: [id number]       Labs:   Lab Results   Component Value Date    WBC 5 48 01/22/2022    HGB 10 2 (L) 01/22/2022    HCT 31 6 (L) 01/22/2022    MCV 96 01/22/2022     01/22/2022     Lab Results   Component Value Date    K 4 3 01/27/2022     01/27/2022    CO2 30 01/27/2022    BUN 20 01/27/2022    CREATININE 1 46 (H) 01/27/2022    GLUF 213 (H) 01/27/2022    CALCIUM 10 1 01/27/2022    CORRECTEDCA 10 7 (H) 01/27/2022    AST 46 (H) 01/27/2022     (H) 01/27/2022    ALKPHOS 212 (H) 01/27/2022    EGFR 45 01/27/2022       ROS: As stated in the history of present illness otherwise his 14 point review of systems today was negative        Active Problems:   Patient Active Problem List   Diagnosis    Pain of upper abdomen    Change in bowel habit    Prostate cancer ([facility] )    BRCA gene mutation positive in male    Coronary artery disease involving native coronary artery of native heart without angina pectoris    Pancreatic mass    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 ([facility]    Pure hypercholesterolemia    Primary hypertension    Anemia    Stage 3 chronic kidney disease ([facility] )    Ischemic cardiomyopathy    History of PTCA    GERD (gastroesophageal reflux disease)    Palliative care patient       Past Medical History:   Past Medical History:   Diagnosis Date    CAD (coronary artery disease)     GERD (gastroesophageal reflux disease)     Hyperlipidemia     Hypertension     [facility] cancer [facility] )     Prostate cancer ([facility] )        Surgical History:   Past Surgical History:   Procedure Laterality Date    CHOLECYSTECTOMY      CORONARY ANGIOPLASTY WITH STENT PLACEMENT         Family History:    Family History   Problem Relation Age of Onset    Pancreatic cancer Mother     Dementia Father     Esophageal cancer Sister Cancer-related family history includes Esophageal cancer in his sister; Pancreatic cancer in his mother      Social History:   Social History     Socioeconomic History    Marital status: /Civil Union     Spouse name: Not on file    Number of children: Not on file    Years of education: Not on file    Highest education level: Not on file   Occupational History    Not on file   Tobacco Use    Smoking status: Never Smoker    Smokeless tobacco: Never Used   Vaping Use    Vaping Use: Never used   Substance and Sexual Activity    Alcohol use: Not Currently    Drug use: Never    Sexual activity: Not Currently   Other Topics Concern    Not on file   Social History Narrative    Not on file     Social Determinants of Health     Financial Resource Strain: Not on file   Food Insecurity: Not on file   Transportation Needs: Not on file   Physical Activity: Not on file   Stress: Not on file   Social Connections: Not on file   Intimate Partner Violence: Not on file   Housing Stability: Not on file       Current Medications:   Current Outpatient Medications   Medication Sig Dispense Refill    Artificial Saliva ([address]) PACK Apply 1 each to the mouth or throat every 4 (four) hours as needed (dry mouth) 30 each 3    aspirin (ECOTRIN LOW STRENGTH) 81 mg EC tablet Take 81 mg by mouth daily      bisacodyl (DULCOLAX) 10 mg suppository Insert 1 suppository (10 mg total) into the rectum daily 12 suppository 0    bisacodyl (DULCOLAX) 5 mg EC tablet Take 1 tablet (5 mg total) by mouth daily as needed for constipation 14 tablet 0    carvedilol (COREG) 12 5 mg tablet Take 1 tablet (12 5 mg total) by mouth 2 (two) times a day with meals (Patient taking differently: Take 12 5 mg by mouth in the morning  ) 30 tablet 0    cetirizine (ZyrTEC) 10 mg tablet Take 10 mg by mouth      docusate sodium (COLACE) 100 mg capsule Take 1 capsule (100 mg total) by mouth 2 (two) times a day as needed for constipation 60 capsule 1    lactulose (CHRONULAC) 10 g/15 mL solution Take 15 mL (10 g total) by mouth 2 (two) times a day as needed (refractory constipation) Take after two days only if constipation is refractory to other agents  240 mL 0    lisinopril (ZESTRIL) 10 mg tablet       ondansetron (ZOFRAN) 4 mg tablet Take 1 tablet (4 mg total) by mouth every 8 (eight) hours as needed for nausea or vomiting 20 tablet 0    oxyCODONE (ROXICODONE) 5 immediate release tablet Take 1 5 tablets (7 5 mg total) by mouth every 4 (four) hours as needed for severe pain Max Daily Amount: 45 mg 120 tablet 0    pantoprazole (PROTONIX) 20 mg tablet       senna (SENOKOT) 8 6 mg Take 1 tablet (8 6 mg total) by mouth daily at bedtime as needed for constipation 30 tablet 0    lubiprostone (AMITIZA) 24 mcg capsule Take 1 capsule (24 mcg total) by mouth 2 (two) times a day with meals (Patient not taking: Reported on 2/1/2022 ) 60 capsule 3     No current facility-administered medications for this visit  Allergies: No Known Allergies    Physical Exam:    Body surface area is 1 99 meters squared  Wt Readings from Last 3 Encounters:   02/01/22 85 3 kg (188 lb)   01/27/22 87 2 kg (192 lb 3 2 oz)   01/19/22 88 9 kg (196 lb)        Temp Readings from Last 3 Encounters:   02/01/22 (!) 96 8 °F (36 °C) (Temporal)   01/27/22 (!) 95 8 °F (35 4 °C) (Temporal)   01/22/22 98 4 °F (36 9 °C) (Oral)        BP Readings from Last 3 Encounters:   02/01/22 (!) 86/56   01/27/22 (!) 88/66   01/22/22 143/75         Pulse Readings from Last 3 Encounters:   02/01/22 84   01/27/22 88   01/22/22 66         Physical Exam     Constitutional   General appearance: No acute distress, well appearing and well nourished  Eyes   Conjunctiva and lids: No swelling, erythema or discharge  Pupils and irises: Equal, round and reactive to light      Ears, Nose, Mouth, and Throat   External inspection of ears and nose: Normal     Nasal mucosa, septum, and turbinates: Normal without edema or erythema  Oropharynx: Normal with no erythema, edema, exudate or lesions  Pulmonary   Respiratory effort: No increased work of breathing or signs of respiratory distress  Auscultation of lungs: Clear to auscultation  Cardiovascular   Palpation of heart: Normal PMI, no thrills  Auscultation of heart: Normal rate and rhythm, normal S1 and S2, without murmurs  Examination of extremities for edema and/or varicosities: Normal     Carotid pulses: Normal     Abdomen   Abdomen: Non-tender, no masses  Liver and spleen: No hepatomegaly or splenomegaly  Lymphatic   Palpation of lymph nodes in neck: No lymphadenopathy  Musculoskeletal   Gait and station: Normal     Digits and nails: Normal without clubbing or cyanosis  Inspection/palpation of joints, bones, and muscles: Normal     Skin   Skin and subcutaneous tissue: Normal without rashes or lesions  Neurologic   Cranial nerves: Cranial nerves 2-12 intact  Sensation: No sensory loss  Psychiatric   Orientation to person, place, and time: Normal     Mood and affect: Normal         Assessment / Plan:    The patient is a pleasant 70-year-old male with metastatic pancreatic cancer newly diagnosed  He was seen at the [facility] also and they have recommended gemcitabine and oxaliplatin  Gemcitabine will be at a 1000 milligrams/meter2 on days 1 and 8  Oxaliplatin will be 85 milligrams/meter2 on day 1  With Neulasta growth factor support on day number 9  He will get an Onpro injector on day 8 when he is here for his 2nd dose of gemcitabine  I went over the risks benefits and alternatives of the regimen  I explained the possible side effects to include but not limited to nausea, vomiting, diarrhea, mouth sores, low blood counts, risk of infection, hair loss, cold sensitivity, jaw claudication, neuropathy and even death  The patient is agreeable to proceeding    He may start at the [address] [place] [address] later this week and then come here next week for the 2nd dose i e  day 8  After which he will continue care at the [facility] office  We will set this up  I will see him back in a month  He will have started treatment by then  I will set him up to have a port placed with Interventional Radiology  I will see him back within a month  He will have started treatment  He will sign a consent form today  Goals and Barriers:  Current Goal:  Prolong Survival from metastatic pancreatic cancer  Barriers: None  Patient's Capacity to Self Care:  Patient  able to self care  Portions of the record may have been created with voice recognition software  Occasional wrong word or "sound a like" substitutions may have occurred due to the inherent limitations of voice recognition software  Read the chart carefully and recognize, using context, where substitutions have occurred

## 2022-02-02 NOTE — TELEPHONE ENCOUNTER
Called pt at 440pm  Stated he moved his bowels better  Feels much better  Instructed to stop the every 2 hour lactulose  Pt then states he stopped the oxycodone "cold turkey" because of the problems (constipation)   Brief instructions on use of stool softeners/laxatives along with oxycodone offered  Pt requested call to his daughter tomorrow  Will call tomorrow

## 2022-02-02 NOTE — TELEPHONE ENCOUNTER
Call to daughter Ryan Gu re pt constipation  States [place] oncologist saw pt 01/31/22 and told pt to take the lactulose 15 ml every 2 hours until he begins to move bowels  Pt has taken 9 doses between 230 pm Tuesday to 300 pm today  At time of this call dgt does not think he moved bowels bu tis passing gas  ( dgt was not with pt at time of this call)    Call placed to pt at 300pm  Pt very awake and pleasant  Reports he had a slight pasty piece of stool  Having gas pains, flatulence  Denies N/V  Reviewed the note from [address] as pt did not recall who told him to take every 2 hours  Reinforced to pt that when he begins to move bowels he needs to Stop the Lactulose  Pt verbalized understanding  During phone call pt suddenly stated he felt the urge to move his bowels  Call ended  Will return call to pt to assess status  Pt of Brian Garcia

## 2022-02-02 NOTE — TELEPHONE ENCOUNTER
Mariluz Rangel (daughter) called office regarding [place] [facility]  She would like to speak to a nurse about constipation   She can be reached at [phone]

## 2022-02-02 NOTE — TELEPHONE ENCOUNTER
Please call patient or daughter tomorrow just to confirm he had a BM and there are no further issues

## 2022-02-04 NOTE — TELEPHONE ENCOUNTER
Regarding: Called this morning- has not received a call back from office  No BM x 4 weeks  ----- Message from Kailyn Arrington RN sent at 2/4/2022  4:34 PM EST -----  Called this morning- has not received a call back from office  No BM x 4 weeks

## 2022-02-04 NOTE — TELEPHONE ENCOUNTER
Patient's daughter Emelyn Núñez ([phone]) called that he says he has not had a bowel movement in about 4 weeks he was taking lactulose and had tiny amount come out when passed gas but not have an actual bowel movement  Palliative care advise him to stop taking the medication  She wants to know what else he could do to help have a bowel possibly a suppository  Please call her back to advised

## 2022-02-04 NOTE — PROGRESS NOTES
Received page from patient's daughter re: plans for bowel regimen over the weekend  Was told to stop lactulose (15mL q2H until BM produced) on Wednesday  On Wed, had some soft pasty stool (reviewed note from 2/2 palliative), none since then  Daughter assured me he has no abdominal distention, N/V with PO intake, tolerating diet well, passing flatus  Just no solid BM for 3-4 weeks now  Patient also received 1st dose of chemo today which they were told could either constipate him further or produce loose stools    Plan:    1  Observe until about 24H after chemo (so around midday) to see if with loose stools  If no diarrhea from chemo:  2  Resume lactulose at a higher dose 30mL 3 times a day until Sunday  3  On Monday, they may use either the suppository or the enema  Scripts sent  4  Our office will check in on them on Monday re: above  5  If with abdominal distention, N/V with PO intake, no flatus, no BM, proceed to the nearest ED to r/o obstruction vs ileus  Daughter voiced understanding       Anila Pacheco MD  Palliative Medicine & Supportive Care  Internal Medicine  Available via [facility] Text  Office: [phone]  Fax: [phone]

## 2022-02-04 NOTE — PROGRESS NOTES
Pt tolerated chemotherapy well without complication  PIV used as port placement is scheduled 02/10  AVS printed and education reviewed  Pt and spouse verbalized understanding  Appt scheduled  Pt left ambulatory with steady gait for d/c to home

## 2022-02-07 NOTE — TELEPHONE ENCOUNTER
Phoned  patient to follow up on bowel status   Spoke with patient who stated he had some results after using 2 dulcolax suppositories   Denies vomiting or abdominal pain States oral   intake is liquids   2 boosts a day     Refuses any further use of Lactulose or Senokot     States he will use Miralax and wants to use dulcolax suppositories every other day

## 2022-02-09 NOTE — TELEPHONE ENCOUNTER
called office c/o N/V  The following questions were asked to better understand severity of pt's current condition  WHEN does N/V typically occur?: on-going     Are you EATING and/or LOSING WEIGHT?: only on liquids    Have you vomited recently? none How often?: na    WHAT was the appearance of the vomit? (color):na    Are you currently undergoing Chemo tx?:  Had on Friday nausea is worse last 2 days    Are you taking anything to alleviate N/V?:zofran 7-8 hrs could he take it sooner and is there something else he can take  Please advise

## 2022-02-09 NOTE — PROGRESS NOTES
Notified that Matthew Peace is having refractory nausea with Zofran  Agree with increasing interval of Zofran to q 8H  Sent refill  Will send script for Reglan over to pharmacy to alternate with Zofran

## 2022-02-10 NOTE — BRIEF OP NOTE (RAD/CATH)
INTERVENTIONAL RADIOLOGY PROCEDURE NOTE    Date: 2/10/2022    Procedure: IR PORT PLACEMENT    Preoperative diagnosis:   1  Malignant neoplasm of head of pancreas ([address] )    2  Chemotherapy induced neutropenia (HCC)    3  BRCA gene mutation positive in male         Postoperative diagnosis: Same  Surgeon: Raza Watson MD     Assistant: None  No qualified resident was available  Blood loss: 5 mL    Specimens: None     Findings: Placement of a right IJ chest port  Please see radiology report for details  Complications: None immediate      Anesthesia: conscious sedation and local

## 2022-02-10 NOTE — INTERVAL H&P NOTE
Update: (This section must be completed if the H&P was completed greater than 24 hrs to procedure or admission)    H&P reviewed  After examining the patient, I find no changed to the H&P since it had been written  BP 99/54   Pulse 95   Temp 97 5 °F (36 4 °C) (Oral)   Resp 18   Ht 5' 8" (1 727 m)   Wt 81 6 kg (180 lb)   SpO2 100%   BMI 27 37 kg/m²     Patient re-evaluated  Accept as history and physical     Patient states having chronic nausea and abdominal pain  We will attempt to make patient comfortable during the procedure and proceed with chest port placement      Alessandro Woods MD/February 10, 2022/8:43 AM

## 2022-02-10 NOTE — DISCHARGE INSTRUCTIONS
Implanted Venous Access Port     WHAT YOU NEED TO KNOW:   An implanted venous access port is a device used to give treatments and take blood  It may also be called a central venous access device (CVAD)  The port is a small container that is placed under your skin, usually in your upper chest  The port is attached to a catheter that enters a large vein  DISCHARGE INSTRUCTIONS:   Resume your normal diet  Small sips of flat soda will help with mild nausea  Prevent an infection:   · Wash your hands often  Use soap and water  Clean your hands before and after you care for your port  Remind everyone who cares for your port to wash their hands  · Check your skin for infection every day  Look for redness, swelling, or fluid oozing from the port site  Care for your port:   1  You may shower beginning 48 hours after procedure  2  Change dressing if it becomes wet  3  Remove dressing after 24 hours  Leave glue in place  4  It is normal for some bruising to occur  5  Use Tylenol for pain  6  Limit use of arm on the side that your port was placed  Lift nothing heavier than 5 pounds for 1 week, and then gradually increase activity as tolerated  7  DO NOT apply ointment, lotion or cream to port site until incision is healed  Allow glue to fall off  DO NOT attempt to peel glue from skin even it it begins to flake  8, After the port incision is healed you may swim, bathe  Notify the Interventional Radiologist if you have any of the followin  Fever above 101 F    2  Increased redness or swelling after 1st day  3  Increased pain after 1st day  4  Any sign of infection (drainage from port site, skin separation, hot to touch)  5  Persistent nausea or vomiting  Contact Interventional Radiology at [phone] [facility] PATIENTS: Contact Interventional Radiology at [phone]) ([address]: Contact Interventional Radiology at [phone])

## 2022-02-11 NOTE — TELEPHONE ENCOUNTER
Spoke to patient's daughter Guillermo Mathur  Did review in detail a soft low-fiber diet for the patient  Also gave her information to a web site for gastro intestinal soft diets off of the [facility] website  Patient was seen by GI at [facility] for EUS with pancreatic stent placement  He is a patient of Dr Jessica De La Vega as well

## 2022-02-11 NOTE — TELEPHONE ENCOUNTER
Per discharge summary from [facility] -  · Currently on clear liquid diet-advance as per GI -discussed soft diet and nutritional shakes    Daughter is Ralph Rear  and is on patient's communication consent

## 2022-02-11 NOTE — PROGRESS NOTES
Pt arrived via w/c for chemo  BP very low today  Manohar Looney RN from Dr Antionette Rose office  Port accessed, skin glue intact  NSS to kvo, premeds infused  BP rechecked after premeds  Improved  Quinn Yanes RN notified  Ok to proceed with Gemzar  Dr Karson Escalera will also order hydration fluids  Pt and daughter informed  Pt contacted cardiologist and instructed to hold his BP meds for awhile and to check his BP at home  Gemzar infusing    WCTM

## 2022-02-11 NOTE — PROGRESS NOTES
Patient completed chemotherapy infusion with no adverse reactions  Dressing CD&I  AVS provided, Neulasta OnPro applied to back of left arm  Instruction provided to patient and his daughter  Left unit via WC accompanied daughter, Montse Dickerson

## 2022-02-11 NOTE — TELEPHONE ENCOUNTER
Patient daughter would like a call back regarding her fathers diet  She needs to know what he can eat or if it should only be liquids

## 2022-02-16 NOTE — TELEPHONE ENCOUNTER
Patient's daughter, Jimmy Solis, is calling in requesting for her father to get lidocaine cream for his port    Patient uses [facility]/pharmacy #[id number] - [address]

## 2022-02-17 NOTE — TELEPHONE ENCOUNTER
Daughter called and is asking next week's OV with Palliative to be a VV through my chart  She is also stating dad did not do well with the Oxycodone for his pain and can no longer take it  She is asking if the Reglan and Zofran can be taken together for the nausea?

## 2022-02-18 NOTE — TELEPHONE ENCOUNTER
Patient calling to speak with Dr Neil Dawn  He wants to know if he can continue with having his dental implants  He states the rest of his procedure is non-invasive  He can be reached at [phone]

## 2022-02-18 NOTE — TELEPHONE ENCOUNTER
Patient had implants done 4 months ago  I scheduled to have them filled  I advised as long as he is not having anything invasive he may continue  Patient verbalized understanding

## 2022-02-22 NOTE — PROGRESS NOTES
Outpatient Virtual Regular Visit - Follow-up with Palliative and [address] 67 y o  male [id number]    Intake:    Reason for virtual visit is symptom follow-up  The patient is unable to visit the clinic safely due to the coronavirus pandemic  After connecting through [employer], the patient was identified by name and date of birth  Mariann Noland or their agent was informed that this was a telemedicine visit and that the visit is being conducted through [facility] and patient was informed this is a secure, HIPAA-complaint platform  He agrees to proceed     My office door was closed  No one else was in the room  They acknowledged consent and understanding of privacy and security of the video platform  The patient or agent has agreed to participate and understands they can discontinue the visit at any time         Assessment & Plan  Problem List Items Addressed This Visit        Digestive    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 [facility])    Malignant neoplasm of head of pancreas ([facility] [address] ) - Primary    Relevant Medications    polyethylene glycol (MIRALAX) 17 g packet       Genitourinary    Prostate cancer (HCC)    Relevant Medications    senna (SENOKOT) 8 6 mg    oxyCODONE (ROXICODONE) 5 immediate release tablet    ondansetron (ZOFRAN) 4 mg tablet    metoclopramide (REGLAN) 5 mg tablet    Stage 3 chronic kidney disease (HCC)       Other    Change in bowel habit    Pancreatic mass    Relevant Medications    senna (SENOKOT) 8 6 mg    oxyCODONE (ROXICODONE) 5 immediate release tablet    ondansetron (ZOFRAN) 4 mg tablet    Palliative care patient    Relevant Medications    senna (SENOKOT) 8 6 mg    polyethylene glycol (MIRALAX) 17 g packet    oxyCODONE (ROXICODONE) 5 immediate release tablet    ondansetron (ZOFRAN) 4 mg tablet    metoclopramide (REGLAN) 5 mg tablet    Chemotherapy induced neutropenia (HCC)      Other Visit Diagnoses     Therapeutic opioid-induced constipation (OIC)        Relevant Medications senna (SENOKOT) 8 6 mg    Cancer related pain        Relevant Medications    oxyCODONE (ROXICODONE) 5 immediate release tablet    Nausea        Relevant Medications    ondansetron (ZOFRAN) 4 mg tablet    metoclopramide (REGLAN) 5 mg tablet    Drug-induced constipation        Relevant Medications    polyethylene glycol (MIRALAX) 17 g packet    bisacodyl (DULCOLAX) 10 mg suppository      PLAN:  - Adjusted bowel regimen: Senna 8 6mg QHS, Miralax 17g QD, dulcolax suppository PRN  - increased OxyIR 10 mg Q6H PRN  - refilled Zofran and Reglan  - will discuss home IV fluid infusions with oncology  - consider mirtazapine at next appointment for appetite, sleep   - RTC 3 weeks  - ACP - not addressed this visit   - Counseling on health screening and disease prevention, COVID-19 specific (CPT V65 49)    Medications adjusted this encounter:  Requested Prescriptions     Signed Prescriptions Disp Refills    senna (SENOKOT) 8 6 mg 30 tablet 0     Sig: Take 1 tablet (8 6 mg total) by mouth daily at bedtime as needed for constipation    polyethylene glycol (MIRALAX) 17 g packet 10 each 0     Sig: Take 17 g by mouth daily    oxyCODONE (ROXICODONE) 5 immediate release tablet 120 tablet 0     Sig: Take 1 - 2 tablets PO Q6H PRN pain   ondansetron (ZOFRAN) 4 mg tablet 90 tablet 0     Sig: Take 1 tablet (4 mg total) by mouth every 8 (eight) hours as needed for nausea or vomiting    bisacodyl (DULCOLAX) 10 mg suppository 12 suppository 0     Sig: Insert 1 suppository (10 mg total) into the rectum daily as needed for constipation    metoclopramide (REGLAN) 5 mg tablet 120 tablet 2     Sig: Take 1 tablet (5 mg total) by mouth 4 (four) times a day     No orders of the defined types were placed in this encounter      Medications Discontinued During This Encounter   Medication Reason    bisacodyl (DULCOLAX) 5 mg EC tablet Alternate therapy    lactulose (CHRONULAC) 10 g/15 mL solution Alternate therapy    senna (SENOKOT) 8 6 mg Reorder    oxyCODONE (ROXICODONE) 5 immediate release tablet Reorder    bisacodyl (DULCOLAX) 10 mg suppository Reorder    metoclopramide (REGLAN) 5 mg tablet Reorder    ondansetron (ZOFRAN) 4 mg tablet Reorder         Surinder Yarbrough was seen today for symptoms and planning cares related to above illnesses  I have reviewed the patient's controlled substance dispensing history in the Prescription Drug Monitoring Program in compliance with the [facility] regulations before prescribing any controlled substances  From [place]:  1  [id number] 01/27/2022 01/27/2022 oxyCODONE  0 14 5 MG 64 29 MARIA DOLORES BOSWELL  [facility]  [place] / 0 [place]    1  [id number] 01/22/2022 01/21/2022 oxyCODONE HCL 30 0 5 5 MG 45 0 CHRISTOPHER SMITH  [facility]  Private Pay 00 / 0 [place]                They are invited to continue to follow with us  If there are questions or concerns, please contact us through our clinic/answering service 24 hours a day, seven days a week  Shyam Staff, RICHY  [facility] Palliative and Supportive Care          Visit Information    Accompanied By: No one    Source of History: Self    History Limitations: None    History of Present Illness      Surinder Yarbrough is a 67 y o  male who presents in follow up of symptoms related to create a cancer with liver metastasis  Pertinent issues include: symptom management, pain, neoplasm related, nausea, fatigue, assessment of goals of care, disease process education and discussion of prognosis, advance care planning    Renetta Frost was initially seen by [facility] in January 2022 where he underwent workup for his newly diagnosed pancreatic cancer  Renetta Frost follows with Dr Char Hannon and Georgette To  He is being treated with gemcitabine, with pegfilgrastim (Neulasta)  Renetta Frost had his 1st outpatient [facility] follow-up appointment on 01/27/2022    At that time, Wally's main complaint was constipation for which she was taking senna with no relief combined with lubiprostone BID  His bowel regimen was adjusted at that time to include Colace, and Ducolax  Would also had complaints of fatigue, limited appetite, and shortness of breath  He was noted to have low blood pressure on the prior visit and he was encouraged to check it once or twice daily and to be in contact with his cardiologist in order to adjust his antihypertensives  Today, David Miller presents for virtual visit  He reports his constipation initially responded well after taking two Ducolax suppositories  He reports over the last 4 day he is beginning to develop constipation  He reports confusion over his bowel regimen  We reviewed his medications  I recommended that David Miller take senna daily as well as MiraLax with Ducolax suppositories if there is refractory constipation  I instructed him to defer further lactulose at this time unless he has refractory constipation after the above agents  We discussed Wally's pain  He states he stopped taking the oxycodone due to concerns of constipation  We reviewed the possibility of restarting it once David Miller takes a reliable bowel regimen and he is agreeable  Sent refill for 5-10 mg oxycodone Q6H p r n  David Miller continues to have nausea  Refilled Zofran and Reglan  David Miller also inquires about having infusions done at the home  He has visiting nursing established already  He states home infusions would be approved by his insurance  I sent a message to oncology associates regarding this  David Miller endorses limited appetite  He was taking boost nutritional supplements  He reported his gastroenterologist place him on a fiber restricted soft diet however he was not comfortable taking more than liquid diet at the time due to severe constipation  He endorsed mild nausea which was relieved with Zofran and later he called the office to report refractory nausea and script for at [address] [place] [address] Drive was sent      Will follow with Sheela Ordonez in approximately 3-4 weeks for symptom follow-up      Social:   Sharee Rosen,  45 years] [phone]   - two adult children              - Irwin Riser [phone]              - Jeanette Hewitt, resides in [facility] [address]   - four grandchildren [aged 10 + 6 (twins], 9, and 8]   - resides with spouse, currently with 24/7 paid care giver support   - PhD in organic chemistry, retired, previous work with [employer] x 22 years     Past Medical History:   Diagnosis Date    CAD (coronary artery disease)     GERD (gastroesophageal reflux disease)     Hyperlipidemia     Hypertension     Panc[address] [address] [address]     Prostate cancer ([facility] [address] )      Past Surgical History:   Procedure Laterality Date    CHOLECYSTECTOMY      CORONARY ANGIOPLASTY WITH STENT PLACEMENT      IR PORT PLACEMENT  2/10/2022     Current Outpatient Medications   Medication Sig Dispense Refill    aspirin (ECOTRIN LOW STRENGTH) 81 mg EC tablet Take 81 mg by mouth daily      bisacodyl (DULCOLAX) 10 mg suppository Insert 1 suppository (10 mg total) into the rectum daily as needed for constipation 12 suppository 0    cetirizine (ZyrTEC) 10 mg tablet Take 10 mg by mouth      metoclopramide (REGLAN) 5 mg tablet Take 1 tablet (5 mg total) by mouth 4 (four) times a day 120 tablet 2    ondansetron (ZOFRAN) 4 mg tablet Take 1 tablet (4 mg total) by mouth every 8 (eight) hours as needed for nausea or vomiting 90 tablet 0    pantoprazole (PROTONIX) 20 mg tablet       Artificial Saliva (SalivaMAX) PACK Apply 1 each to the mouth or throat every 4 (four) hours as needed (dry mouth) (Patient not taking: Reported on 2/11/2022 ) 30 each 3    bisacodyl (FLEET) 10 MG/30ML ENEM Insert 30 mL (10 mg total) into the rectum once for 1 dose If lactulose or suppository not helpful 30 mL 0    carvedilol (COREG) 12 5 mg tablet Take 1 tablet (12 5 mg total) by mouth 2 (two) times a day with meals (Patient not taking: Reported on 2/11/2022 ) 30 tablet 0    docusate sodium (COLACE) 100 mg capsule Take 1 capsule (100 mg total) by mouth 2 (two) times a day as needed for constipation (Patient not taking: Reported on 2/11/2022 ) 60 capsule 1    lactulose (CHRONULAC) 10 g/15 mL solution Take 30 mL (20 g total) by mouth 3 (three) times a day (Patient not taking: Reported on 2/22/2022 ) 473 mL 0    lidocaine-prilocaine (EMLA) cream APPLY TOPICALLY AS NEEDED FOR MILD PAIN TO PORT SITE 30-60 MIN PRIOR TO ACCESS  NOT AVAILABLE 30 g 2    lisinopril (ZESTRIL) 10 mg tablet       lubiprostone (AMITIZA) 24 mcg capsule Take 1 capsule (24 mcg total) by mouth 2 (two) times a day with meals (Patient not taking: Reported on 2/1/2022 ) 60 capsule 3    oxyCODONE (ROXICODONE) 5 immediate release tablet Take 1 - 2 tablets PO Q6H PRN pain  120 tablet 0    polyethylene glycol (MIRALAX) 17 g packet Take 17 g by mouth daily 10 each 0    senna (SENOKOT) 8 6 mg Take 1 tablet (8 6 mg total) by mouth daily at bedtime as needed for constipation 30 tablet 0     No current facility-administered medications for this visit  No Known Allergies    Review of Systems   Constitutional: Positive for activity change, appetite change and fatigue  Negative for fever and unexpected weight change  HENT: Negative for congestion, facial swelling and hearing loss  Respiratory: Negative for apnea and chest tightness  Cardiovascular: Negative for chest pain  Gastrointestinal: Positive for constipation and nausea  Negative for abdominal distention, abdominal pain and vomiting  Genitourinary: Negative for difficulty urinating and dysuria  Musculoskeletal: Negative for arthralgias  Skin: Negative for color change  Neurological: Negative for dizziness and light-headedness  Psychiatric/Behavioral: Negative for agitation, behavioral problems and dysphoric mood  Video Exam  There were no vitals filed for this visit  Physical Exam  Vitals and nursing note reviewed   Exam conducted with gerald chaperone present  Constitutional:       General: He is not in acute distress  Appearance: Normal appearance  He is well-developed  He is not ill-appearing  Comments: Pleasant, fully conversational   HENT:      Head: Normocephalic and atraumatic  Eyes:      Conjunctiva/sclera: Conjunctivae normal    Pulmonary:      Effort: Pulmonary effort is normal  No respiratory distress  Musculoskeletal:      Cervical back: Neck supple  Skin:     Coloration: Skin is pale  Neurological:      Mental Status: He is alert and oriented to person, place, and time  Mental status is at baseline  I spent 40+ minutes was spent during this virtual visit by Doug face to face with Zita Valera with greater than 50% of the time spent in counseling or coordination of care including discussions of etiology of diagnosis, pathogenesis of diagnosis, treatment instructions, follow up requirements, risk factors and risk reduction of disease, patient and family counseling/involvement in care and compliance with treatment regimen   All of the patient's or agent's questions were answered during this discussion  Encounter provider Lloyd Ernst PA-C, located at:  [facility],[address]  [address]  [phone]    Recent Visits  No visits were found meeting these conditions  Showing recent visits within past 7 days and meeting all other requirements  Today's Visits  Date Type Provider Dept   02/22/22 [address] [place] [address] today's visits and meeting all other requirements  Future Appointments  No visits were found meeting these conditions  Showing future appointments within next 150 days and meeting all other requirements       VIRTUAL VISIT DISCLAIMER    Zita Valera or their agent has consented to an online visit or consultation    They understands that the online visit is based solely on information provided by the patient or agent, and that, in the absence of a face-to-face physical evaluation by the physician, the diagnosis they receive is both limited and provisional in terms of accuracy and completeness  This is not intended to replace a full medical face-to-face evaluation by the physician  Иван Covarrubias or their agent understands and accepts these terms  The patient or their agent was informed this is a billable service

## 2022-02-23 NOTE — TELEPHONE ENCOUNTER
Prior Authorization needed for Zofran       Phone# [facility] [phone]    [facility]
lab results/radiology results

## 2022-02-23 NOTE — TELEPHONE ENCOUNTER
Patient calling to request lab work order  He is schedule for treatment Friday 2/25/22  He can be reached at [phone]

## 2022-02-24 NOTE — TELEPHONE ENCOUNTER
Received a message on refill line from Jeny Ernandez from patients insurance stating a PA was required for ondansetron     It can be done she stated through covermymeds or quicker if called to [phone]    Phone call made to this number Spoke with Yanni Gutierrez answering all questions  Got disconnected  Called again  Spoke with Chris Cagle who stated Zofran was DENIED     Next step get the patient to write a letter of appeal     Another call made to insurance    Spoke with Navjot Roque  Who checked on status   Zofran now marked as approved 1/25/22 through 2/24/23       With max amount  240/90 days     Pharmacy notified  Who will contact patient

## 2022-02-25 NOTE — PLAN OF CARE
Problem: Knowledge Deficit  Goal: Patient/family/caregiver demonstrates understanding of disease process, treatment plan, medications, and discharge instructions  Description: Complete learning assessment and assess knowledge base    Interventions:  - Provide teaching at level of understanding  - Provide teaching via preferred learning methods  Outcome: Progressing     Problem: Potential for Falls  Goal: Patient will remain free of falls  Description: INTERVENTIONS:  - Educate patient/family on patient safety including physical limitations  - Instruct patient to call for assistance with activity   - Keep Call bell within reach  - Keep bed low and locked with side rails adjusted as appropriate  - Keep care items and personal belongings within reach  - Initiate and maintain comfort round  - Consider moving patient to room near nurses station  Outcome: Progressing

## 2022-02-25 NOTE — PROGRESS NOTES
Pt here today for chemo  tolerated well no adverse reactions  AVS provided and is aware of next appointment  Pt  Provided with occult blood stool cards x 3 and reminded to collect sample and to return to the lab  Pt left ambulatory with steady gait accompanied by his son

## 2022-03-04 NOTE — PROGRESS NOTES
Hematology/Oncology Outpatient Follow- up Note  Bennett Diez 67 y o  male MRN: @ Encounter: [id number]        Date:  3/4/2022    Presenting Complaint/Diagnosis : Metastatic pancreatic cancer  HPI:    The patient is a pleasant 23-year-old male with a newly diagnosed 6 4 x 5 7 x 4 9 cm obstructing pancreatic head mass with internal necrosis highly worrisome for primary pancreatic neoplasm with multiple liver metastases and retroperitoneal adenopathy suggestive of metastatic disease  There was also diffusely mottled appearance of pelvic osseous structures from prior prostate radiation on imaging although metastatic disease could not be ruled out  The patient had an EUS with biopsy of both the liver lesion and the pancreatic mass both of which revealed adenocarcinoma consistent with metastatic pancreatic cancer  The patient was referred to see us for further evaluation  He was evaluated in the hospital when he was admitted with jaundice and elevated liver function tests which are improving over the last 1 and half weeks  The patient was seen at the [address] [place] [address] and they have recommended gemcitabine and oxaliplatin which is a reasonable regimen for this  Modified FOLFOX 6 is also a possibility but they have him set up for gemcitabine and Abraxane end of this week or next week  I would dose reduce the oxaliplatin to 85 milligrams/meter subcutaneously based on his age and performance status along with the slight abnormality in his creatinine       Previous Hematologic/ Oncologic History:    Oncology History   Malignant neoplasm of head of pancreas ([address] )   2/1/2022 Initial Diagnosis    Malignant neoplasm of head of pancreas ([address] )     2/4/2022 -  Chemotherapy    pegfilgrastim (Hershell Friar), 6 mg, Subcutaneous, Once, 2 of 6 cycles  Administration: 6 mg (2/11/2022)  gemcitabine (GEMZAR) infusion, 1,990 1 mg, Intravenous, Once, 2 of 6 cycles  Administration: 2,000 mg (2/4/2022), 2,000 mg (2/11/2022), 2,000 mg (2/25/2022)  oxaliplatin (ELOXATIN) chemo infusion, 85 mg/m2 = 169 15 mg (85 % of original dose 100 mg/m2), Intravenous, Once, 2 of 6 cycles  Dose modification: 85 mg/m2 (original dose 100 mg/m2, Cycle 1, Reason: Performance Status)  Administration: 169 15 mg (2/4/2022), 164 9 mg (2/25/2022)       Workup    Current Hematologic/ Oncologic Treatment:      Gemcitabine will be at a 1000 milligrams/meter2 on days 1 and 8  Oxaliplatin will be 85 milligrams/meter2 on day 1  With Neulasta growth factor support on day number 9  Standing blood transfusion order  Interval History:    The patient returns for follow-up visit  His hemoglobin has dropped  He does have a hemorrhoid but states it is not bleeding  This may be from chemotherapy also  Regardless for now we will continue him on his current regimen  He will get 2 units of blood today  If this becomes a constant issue we will discuss Aranesp  Denies any nausea denies any vomiting  States he is tolerating the chemotherapy reasonably well  He is trying to increase his food intake  Denies any bleeding or vomiting per se  He is taking MiraLax for constipation  He is fatigued from his anemia  Heart rate is slightly tachycardic probably related to his anemia  Hopefully this will improve after the 2 units of blood today  The rest of his 14 point review of systems today was negative  Test Results:    Imaging: CT chest wo contrast    Result Date: 2/10/2022  Narrative: CT CHEST WITHOUT IV CONTRAST INDICATION:   K86 89: Other specified diseases of pancreas  COMPARISON:  CT abdomen pelvis 1/19/2022  TECHNIQUE: CT examination of the chest was performed without intravenous contrast   Axial, sagittal, and coronal 2D reformatted images were created from the source data and submitted for interpretation  Radiation dose length product (DLP) for this visit:  359 mGy-cm     This examination, like all CT scans performed in the [facility], was performed utilizing techniques to minimize radiation dose exposure, including the use of iterative reconstruction and automated exposure control  FINDINGS: LUNGS:  Benign-appearing 3 mm left fissural nodule is noted series 3 image 48  There is no tracheal or endobronchial lesion  PLEURA:  Unremarkable  HEART/GREAT VESSELS: Heart is unremarkable for patient's age  No thoracic aortic aneurysm  Extensive coronary artery calcification is present  MEDIASTINUM AND HAN:  3 5 x 1 6 cm retrocrural lymph node is noted series 2 image 46  2 5 x 1 8 cm node is identified in the superior mediastinum in the left paratracheal region  There are CHEST WALL AND LOWER NECK:   2 1 cm left supraclavicular lymph node is noted  VISUALIZED STRUCTURES IN THE UPPER ABDOMEN:  Pancreatic head mass lesion again seen  Multiple hepatic lesions are again identified  Common duct stent is in place  Right renal cysts are again seen  OSSEOUS STRUCTURES:  No acute fracture or destructive osseous lesion  Impression: Mediastinal, retrocrural and left supraclavicular lymphadenopathy  No evidence of pulmonary metastatic disease  Pancreatic head mass and hepatic lesions in keeping with metastatic disease  Workstation performed: [id number]     IR port placement    Result Date: 2/10/2022  Narrative: PROCEDURE: 1  Ultrasound guided access of the right internal jugular vein 2  Chest port placement STAFF: KAYCEE Gillespie  FLUOROSCOPY TIME: 1 0 minutes  NUMBER OF IMAGES: 3  COMPLICATIONS: None  MEDICATIONS: Versed 3 5 milligrams iv  Fentanyl 200 micrograms iv  Zofran 4 mg Antibiotics: 2 g Ancef IV  Moderate sedation was monitored by radiology nursing staff  Monitoring of conscious sedation time totaled 45 minutes  INDICATION: Venous access for chemotherapy  PROCEDURE: The right neck and upper chest were prepped and draped in the usual sterile fashion   All elements of maximal sterile barrier technique were followed (cap, mask, sterile gown, sterile gloves, large sterile sheet, hand hygiene, and 2% chlorhexidine for cutaneous antisepsis)  Using real-time ultrasound guidance utilizing a sterile ultrasound cover and gel, the right internal jugular vein was punctured  An image was stored in PACS  A wire was advanced into the inferior vena cava  A subcutaneous pocket was then created using  blunt dissection  An 8 [employer] PowerPort was flushed with normal saline  The catheter was then advanced under fluoroscopic guidance through a peel-away sheath into the right atrium  The port was then connected to the catheter  The catheter was  attached firmly to the port  The port was accessed and aspirated freely  Heparinized saline was injected and there was no leakage  The sponge count was reconciled  The port was sutured in the pocket  A subcutaneous closure was then performed using 3-0 absorbable suture and surgical glue  A sterile dressing was applied  The port may be used immediately  FINDINGS: 1   Real-time ultrasound showed an anechoic and freely compressible right internal jugular vein  2   Final fluoroscopic image shows the chest port to be in good position  Impression: Chest port placement   Workstation performed: [id number]       Labs:   Lab Results   Component Value Date    WBC 4 43 03/03/2022    HGB 7 0 (L) 03/03/2022    HCT 21 4 (L) 03/03/2022    MCV 92 03/03/2022     03/03/2022     Lab Results   Component Value Date    K 3 7 03/03/2022    CL 95 (L) 03/03/2022    CO2 27 03/03/2022    BUN 17 03/03/2022    CREATININE 1 13 03/03/2022    GLUF 164 (H) 03/03/2022    CALCIUM 8 6 03/03/2022    CORRECTEDCA 9 8 03/03/2022    AST 44 03/03/2022    ALT 30 03/03/2022    ALKPHOS 147 (H) 03/03/2022    EGFR 62 03/03/2022         Lab Results   Component Value Date    IRON 42 (L) 02/25/2022    TIBC 199 (L) 02/25/2022    FERRITIN 911 (H) 02/25/2022         ROS: As stated in the history of present illness otherwise his 14 point review of systems today was negative  Active Problems:   Patient Active Problem List   Diagnosis    Pain of upper abdomen    Change in bowel habit    Prostate cancer ([place] [address] )    BRCA gene mutation positive in male    Coronary artery disease involving native coronary artery of native heart without angina pectoris    Pancreatic mass    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 ([place] [address] )    Pure hypercholesterolemia    Primary hypertension    Anemia    Stage 3 chronic kidney disease ([place] [address] )    Ischemic cardiomyopathy    History of PTCA    GERD (gastroesophageal reflux disease)    Palliative care patient    Malignant neoplasm of head of pancreas ([place] [address] )    Chemotherapy induced neutropenia ([place] [address] )       Past Medical History:   Past Medical History:   Diagnosis Date    CAD (coronary artery disease)     GERD (gastroesophageal reflux disease)     Hyperlipidemia     Hypertension     Pancreatic cancer ([place] [address] )     Prostate cancer ([place] [address] )        Surgical History:   Past Surgical History:   Procedure Laterality Date    CHOLECYSTECTOMY      CORONARY ANGIOPLASTY WITH STENT PLACEMENT      IR PORT PLACEMENT  2/10/2022       Family History:    Family History   Problem Relation Age of Onset    Pancreatic cancer Mother     Dementia Father     Esophageal cancer Sister        Cancer-related family history includes Esophageal cancer in his sister; Pancreatic cancer in his mother      Social History:   Social History     Socioeconomic History    Marital status: /Civil Union     Spouse name: Not on file    Number of children: Not on file    Years of education: Not on file    Highest education level: Not on file   Occupational History    Not on file   Tobacco Use    Smoking status: Never Smoker    Smokeless tobacco: Never Used   Vaping Use    Vaping Use: Never used   Substance and Sexual Activity    Alcohol use: Not Currently    Drug use: Never    Sexual activity: Not Currently   Other Topics Concern    Not on file   Social History Narrative    Not on file     Social Determinants of Health     Financial Resource Strain: Not on file   Food Insecurity: Not on file   Transportation Needs: Not on file   Physical Activity: Not on file   Stress: Not on file   Social Connections: Not on file   Intimate Partner Violence: Not on file   Housing Stability: Not on file       Current Medications:   Current Outpatient Medications   Medication Sig Dispense Refill    aspirin (ECOTRIN LOW STRENGTH) 81 mg EC tablet Take 81 mg by mouth daily      bisacodyl (DULCOLAX) 10 mg suppository Insert 1 suppository (10 mg total) into the rectum daily as needed for constipation 12 suppository 0    cetirizine (ZyrTEC) 10 mg tablet Take 10 mg by mouth      lidocaine-prilocaine (EMLA) cream APPLY TOPICALLY AS NEEDED FOR MILD PAIN TO PORT SITE 30-60 MIN PRIOR TO ACCESS  NOT AVAILABLE 30 g 2    metoclopramide (REGLAN) 5 mg tablet Take 1 tablet (5 mg total) by mouth 4 (four) times a day 120 tablet 2    ondansetron (ZOFRAN) 4 mg tablet Take 1 tablet (4 mg total) by mouth every 8 (eight) hours as needed for nausea or vomiting 90 tablet 0    oxyCODONE (ROXICODONE) 5 immediate release tablet Take 1 - 2 tablets PO Q6H PRN pain   120 tablet 0    pantoprazole (PROTONIX) 20 mg tablet       polyethylene glycol (MIRALAX) 17 g packet Take 17 g by mouth daily 10 each 0    senna (SENOKOT) 8 6 mg Take 1 tablet (8 6 mg total) by mouth daily at bedtime as needed for constipation 30 tablet 0    Artificial Saliva (SalivaMAX) PACK Apply 1 each to the mouth or throat every 4 (four) hours as needed (dry mouth) (Patient not taking: Reported on 2/11/2022 ) 30 each 3    bisacodyl (FLEET) 10 MG/30ML ENEM Insert 30 mL (10 mg total) into the rectum once for 1 dose If lactulose or suppository not helpful (Patient not taking: Reported on 3/4/2022 ) 30 mL 0    carvedilol (COREG) 12 5 mg tablet Take 1 tablet (12 5 mg total) by mouth 2 (two) times a day with meals (Patient not taking: Reported on 2/11/2022 ) 30 tablet 0    docusate sodium (COLACE) 100 mg capsule Take 1 capsule (100 mg total) by mouth 2 (two) times a day as needed for constipation (Patient not taking: Reported on 2/11/2022 ) 60 capsule 1    lactulose (CHRONULAC) 10 g/15 mL solution Take 30 mL (20 g total) by mouth 3 (three) times a day (Patient not taking: Reported on 2/22/2022 ) 473 mL 0    lisinopril (ZESTRIL) 10 mg tablet  (Patient not taking: Reported on 2/25/2022 )      lubiprostone (AMITIZA) 24 mcg capsule Take 1 capsule (24 mcg total) by mouth 2 (two) times a day with meals (Patient not taking: Reported on 2/1/2022 ) 60 capsule 3     No current facility-administered medications for this visit  Allergies: No Known Allergies    Physical Exam:    There is no height or weight on file to calculate BSA  Wt Readings from Last 3 Encounters:   02/25/22 81 4 kg (179 lb 7 3 oz)   02/11/22 81 7 kg (180 lb 1 9 oz)   02/10/22 81 6 kg (180 lb)        Temp Readings from Last 3 Encounters:   03/04/22 (!) 96 8 °F (36 °C) (Temporal)   02/25/22 (!) 96 7 °F (35 9 °C) (Temporal)   02/11/22 (!) 96 4 °F (35 8 °C) (Temporal)        BP Readings from Last 3 Encounters:   03/04/22 100/62   02/25/22 115/75   02/11/22 91/60         Pulse Readings from Last 3 Encounters:   03/04/22 (!) 123   02/25/22 (!) 114   02/11/22 78         Physical Exam     Constitutional   General appearance: No acute distress, well appearing and well nourished  Eyes   Conjunctiva and lids: No swelling, erythema or discharge  Pupils and irises: Equal, round and reactive to light  Ears, Nose, Mouth, and Throat   External inspection of ears and nose: Normal     Nasal mucosa, septum, and turbinates: Normal without edema or erythema  Oropharynx: Normal with no erythema, edema, exudate or lesions  Pulmonary   Respiratory effort: No increased work of breathing or signs of respiratory distress  Auscultation of lungs: Clear to auscultation  Cardiovascular   Palpation of heart: Normal PMI, no thrills  Auscultation of heart: Normal rate and rhythm, normal S1 and S2, without murmurs  Examination of extremities for edema and/or varicosities: Normal     Carotid pulses: Normal     Abdomen   Abdomen: Non-tender, no masses  Liver and spleen: No hepatomegaly or splenomegaly  Lymphatic   Palpation of lymph nodes in neck: No lymphadenopathy  Musculoskeletal   Gait and station: Normal     Digits and nails: Normal without clubbing or cyanosis  Inspection/palpation of joints, bones, and muscles: Normal     Skin   Skin and subcutaneous tissue: Normal without rashes or lesions  Neurologic   Cranial nerves: Cranial nerves 2-12 intact  Sensation: No sensory loss  Psychiatric   Orientation to person, place, and time: Normal     Mood and affect: Normal         Assessment / Plan:      The patient is a pleasant 56-year-old male with metastatic pancreatic cancer newly diagnosed  He was seen at the [facility] also and they have recommended gemcitabine and oxaliplatin  Gemcitabine will be at a 1000 milligrams/meter2 on days 1 and 8  Oxaliplatin will be 85 milligrams/meter2 on day 1  With Neulasta growth factor support on day number 9  Day 8 cycle 2  Is today i e  The 4th of March  We will continue this for now  The plan is for 3 months of treatment and then we will re-evaluate  The patient is in agreement with the plan  The patient will get 2 units of blood today  He has a standing blood transfusion order in the chart  I went over the risks benefits alternatives a blood transfusion with the patient  I explained the possible complications including mismatch reaction, risk of infection and even death  The patient is agreeable to proceeding  I will see him back in a month  He will stay on his current regimen with no changes  He will increase his nutrition  If he has any questions he will call our office        Goals and Barriers:  Current Goal:  Prolong Survival from metastatic pancreatic cancer  Barriers: None  Patient's Capacity to Self Care:  Patient able to self care  Portions of the record may have been created with voice recognition software  Occasional wrong word or "sound a like" substitutions may have occurred due to the inherent limitations of voice recognition software  Read the chart carefully and recognize, using context, where substitutions have occurred

## 2022-03-04 NOTE — PROGRESS NOTES
Patient completed 2u PRBCs and chemotherapy infusion with no adverse reactions  Neulasta Onpro applied to back of left arm, green light blinking  Patient aware of when to remove, included in AVS  Left unit via [facility] for transport home

## 2022-03-18 PROBLEM — E86.0 DEHYDRATION: Status: ACTIVE | Noted: 2022-01-01

## 2022-03-18 NOTE — PROGRESS NOTES
Per Dr Enid Mejia okay to treat with hemoglobin of 7 9 as long as patient isn't symptomatic  He would like the patient to have labs repeated next week and set up for transfusion if he meets parameters of  Hemoglobin of less than 7 0  Pt also needs to complete occult blood, fecal Immunochemical  Cathy López RN notified

## 2022-03-18 NOTE — TELEPHONE ENCOUNTER
Pt called and stated he received the stool kit in the mail ordered by Dr Chapito Fisher  He stated the instructions inside said to return to the lab within 48 hours of obtaining the sample  Pt will attempt to obtain the sample tomorrow or Sunday  Pt also stated he has hemorrhoids which aren't currently bleeding  Pt will notify us if he was to have hemorrhoidal bleeding  Pt also stated he is experiencing some pins and needles sensation in his hands  He stated he has had this before but seem to be a little worse since his most recent treatment  Pt stated he will call us on Monday with an update on this side effect

## 2022-03-18 NOTE — PROGRESS NOTES
Rec'd pt in fair spirits  See 10:34 note by Noel Calvo RN  Pt c/o 3/10 abdominal "achy pressure" but reports not taking pain meds due to constipation  Symptom management discussed and pt will try Senna instead of Colace only  Also, pt reports having an upcoming Palliative Care appointment  Pt encouraged to record and share his symptoms at that appointment and to call the Palliative care office sooner if needed  Pt and daughter in agreement  Port easily accessed  New hydration fluids added to treatment plan today and tolerated well

## 2022-03-18 NOTE — PROGRESS NOTES
Infusions tolerated well  Pt ate soup and was ambulatory intermittently accompanied by daughter, no complaints offered throughout treatment today  Port deaccessed and pt discharged via wc with AVS in hand  Pt aware of Monday lab draw and Tuesday transfusion

## 2022-03-18 NOTE — PROGRESS NOTES
Received message from [facility] Urine RN stating the patient presents to infusion with  He c/o not feeling himself , dizziness on occasion at night, cant sleep , very fatigued  No chest pain or SOB  Lips pink  Poor appetite, taking only liquids for past week  Reviewed above information with Dr Moises Garcia  In addition to hemoglobin of 7 9  Per Dr Moises Garcia, okay to treat today  Pt is to receive 500 cc normal saline today and set up for 2 units of RBC on Monday or Tuesday of next week  Pt has apt on Thursday 3/24 with palliative care for additional symptom management  [facility] RN notified

## 2022-03-21 NOTE — PROGRESS NOTES
Port labs drawn per protocol  Pt c/o " bad pins and needles in my hands and feet  I had a hard time sleeping and walking since my last treatment  My ankles are swollen and I have mild pain in my calves 1/10 " Pt explains further that symptoms are slowly improving since Sunday  BBS are clear but slightly decreased in lower right lobe, no SOB, cough or CP reported  +2 pitting edema in both ankles , no redness or heat  Edema limited to ankles  Mendez Coy RN with Dr Lawanda House office made aware of all symptoms  Pt has been encouraged to call palliative care about neuropathy and insomnia  Pt and daughter also understand that they should call the office with worsening symptoms and go to the ER in case of SOB or chest pain  Pt encouraged to elevate legs  Instructions reviewed and all questions answered to pt's satisfaction  Pt will return tomorrow for transfusion

## 2022-03-22 NOTE — TELEPHONE ENCOUNTER
Called and spoke with Naima Hodge and reviewed his positive Occult Blood, Fecal Immunochemical test  Reviewed that Dr Neil Dawn and Salena Ibarra want him to follow up with GI to determine where the bleed is coming from and further steps  Stated Dr Neil Dawn did discuss it could be the tumor itself  In the meantime we will continue checking his hemoglobin and transfuse as needed  Advised her to proceed to the ED if he experiencing and bright red blood in his stool or toilet bowl  She verbalized understanding and was agreeable to the plan  She will call his GI doctor to see up an apt

## 2022-03-22 NOTE — PROGRESS NOTES
Patient received 2u PRBCs followed by IV lasix  No issues, patient stable for discharge  Contacted Dr Henry Zimmer office to have more labs added to his OP orders so he can have them drawn on Thursday  Dressing CD&I, AVS provided, left unit ambulatory with steady gait

## 2022-03-22 NOTE — PROGRESS NOTES
Per Isidoro GUILLEN pt is to receive 20 mg IV lasix post blood transfusion  Pt has recent complications of B/L lower extremity edema

## 2022-03-24 PROBLEM — D50.0 BLOOD LOSS ANEMIA: Status: ACTIVE | Noted: 2022-01-01

## 2022-03-24 NOTE — PATIENT INSTRUCTIONS
Cal Pate,    It was a pleasure seeing you today! We recommend senna tablet daily to counteract opiod-induced constipation  You can also take colace daily if you find relief with that  We recommend gradated compression socks (higher pressure near foot/ankle than calf)  Please call with any other questions or concerns

## 2022-03-24 NOTE — TELEPHONE ENCOUNTER
Scheduled date of EGD/Flex sig(as of today):3/31/22  Physician performing EGD:Dr Sultana Moreno  Location of EGD:[facility]  Instructions reviewed with patient by: Vincent Covington  Clearances: No

## 2022-03-24 NOTE — PROGRESS NOTES
[address] 67 y o  male [id number]    Assessment & Plan  Problem List Items Addressed This Visit        Digestive    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 [facility])    Relevant Medications    Elastic Bandages & Supports (Medical Compression Socks) MISC    Other Relevant Orders    Ambulatory Referral to Nutrition Services for Oncology       Genitourinary    Prostate cancer [facility])    Relevant Medications    Elastic Bandages & Supports (Medical Compression Socks) MISC    Other Relevant Orders    Ambulatory Referral to Nutrition Services for Oncology    Stage 3 chronic kidney disease (HCC)       Other    Pain of upper abdomen - Primary    Pancreatic mass    Relevant Medications    Elastic Bandages & Supports (Medical Compression Socks) MISC    Other Relevant Orders    Ambulatory Referral to Nutrition Services for Oncology    Palliative care patient    Relevant Medications    mirtazapine (REMERON) 7 5 MG tablet    Elastic Bandages & Supports (Medical Compression Socks) MISC    Other Relevant Orders    Ambulatory Referral to Nutrition Services for Oncology      Other Visit Diagnoses     Insomnia        Relevant Medications    mirtazapine (REMERON) 7 5 MG tablet          Plan:   - initiate mirtazapine 7 5mg QHS for sleep, insomnia   - if no improvement with mirtazapine, consider temazepam, ambien  - continue oxyIR 5-7 5mg PRN pain, no refill needed at this time  - continue Zofran, reglan  - encouraged compression socks  - sent nutrition consult as Oswaldo Gilbert concerned about nutritional intake  - continue artifical saliva  - re-discussed bowel regimen  Oswaldo Gilbert has not been taking anything  Encouraged Senna, colace  Peola Hail looking forward to endoscopy next week, hopeful for answers     - RTC 3 - 4 weeks  - ACP - not addressed this visit     Medications adjusted this encounter:  Requested Prescriptions     Signed Prescriptions Disp Refills    mirtazapine (REMERON) 7 5 MG tablet 30 tablet 0     Sig: Take 1 tablet (7 5 mg total) by mouth daily at bedtime    Elastic Bandages & Supports (Medical Compression Socks) MISC 2 each 0     Sig: Use in the morning 20 - 30mmHG  If possible, please use compression socks with high pressure near foot/ankle (gradated)  Orders Placed This Encounter   Procedures    Ambulatory Referral to Nutrition Services for Oncology     There are no discontinued medications  Niko Ahumada was seen today for symptoms and planning cares related to above illnesses  I have reviewed the patient's controlled substance dispensing history in the Prescription Drug Monitoring Program in compliance with the [facility] regulations before prescribing any controlled substances  02/22/2022  1   02/22/2022  Oxycodone Hcl (Ir) 5 MG Tablet    120 00  [address]   [id number]   Pen (7452)    60 00 MME  [place]   01/27/2022  1   01/27/2022  Oxycodone Hcl (Ir) 5 MG Tablet    120 00  14 [address]   [id number]   Pen (7241)    64 29 MME            They are invited to continue to follow with us  If there are questions or concerns, please contact us through our clinic/answering service 24 hours a day, seven days a week  Olga Guy PA-C  [facility]        Visit Information    Accompanied By: Family member, daughter Leslie Kilpatrick of History: Self    History Limitations: None       History of Present Illness      Niko Ahumada is a 67 y o  male who presents in follow up of symptoms related to pancreatic cancer with liver Mets  Pertinent issues include: symptom management, pain, neoplasm related, nausea, fatigue, assessment of goals of care, disease process education and discussion of prognosis, advance care planning  Sienna Gray was recently diagnosed with pancreatic cancer when his gastroenterologist ordered CT scan revealed pancreatic mass with liver and retroperitoneal Mets    At that time, however was referred to the hospital for intervention related to obstructive jaundice  He was hospitalized Ed as LB from 01/19/2022 - 01/22/2022  [facility] consult met with however during that hospitalization to assist with pain management and to introduce goals of care  At that time, however and was started on Oxy IR 2 5 - 5 mg Q 4 H PRN moderate to severe pain  For insomnia, he was started on temazepam 7 5 mg q h s  p r n     For constipation, he received docusate 100 mg p o  b i d  p r n , lubiprostone 24 mcg b i d  with meals, and senna 1 tablet p o  q h s  p r n     For nausea, he received p o  Zofran  In hospital, goals of care were introduced  At that time, Tamara Ibanez confirmed he would like to be full code level 1  He was provided with 5 Wishes document to review  After discharge, he followed with Dr Jamila Pineda of oncology and was scheduled for 2nd opinion with XAVI Haji  Tamara Ibanez had follow-up with palliative and supportive care, his last appointment 2/22/22  We have continued to adjust his bowel regimen as she reports constipation  He also reports not taking the medications  Today, Tamara Ibanez presents with his daughter Morris Bean for follow-up  He reports his main concern is insomnia  He reports he has been taking oxycodone q h s  to make him drowsy for sleep  He reports his pain is minimal except he does have crampy abdominal pain if he eats solid food  We reviewed discontinuing oxycodone for sleep and initiating in agent such as mirtazapine which may promote sleep and appetite  He is agreeable  We reviewed Wally's bowel regimen as he complains of ongoing constipation  He states he is not taking MiraLax, lactulose or other solutions because he does not tolerate the volume  He states he prefers Colace because it is a pill but he has not been taking this consistently  When I inquired about senna to counter act opioid induced constipation he states he has not been taking it I recommended restarting this    I encouraged however to take both senna and Colace daily if he can continues with constipation  Sheela Ordonez also nodes moderate lower extremity edema of bilateral ankles which she associates with chemotherapy  He denies shortness of breath, cough, fever  He denies calf pain  He denies that this is interfering with his ADLs currently  He states he was given IV Lasix after his infusion which did not provide any benefit  We reviewed compression socks  I recommend radiated compression socks which provide more pressure near the ankle and less pressure as they move up the calves  Sheela Ordonez is willing to try this  Sheela Ordonez and his daughter expressed concerns regarding his nutritional intake as he has not been able to tolerate forms foods and has kept himself to mostly liquid diet  Sheela Ordonez does not tolerate boost supplements well and care and is concerned that he is not getting enough nutrition  We discussed follow-up with nutritionist and they are agreeable  Sheela Ordonez is also hopeful that his endoscopies will reveal a narrowing which could be expanded to allow him to tolerate more formed foods  At this time, Sheela Ordonez continues to take Zofran and scheduled Reglan throughout the day which he believes helps with his symptoms of nausea and food intolerance  At this time, I would like to follow closely with Sheela Ordonez monitor symptoms  Will arrange for follow-up in approximately 3-4 weeks  From prior: In the office today, Sheela Ordonez presents with his daughter Krystyna Rose  He has multiple complaints  His main complaint is refractory constipation  Per Sheela Ordonez, he has not had a bowel movement in approximately 2 weeks  He denies vomiting or inability to tolerate p o  intake  He endorses passing gas  He states he is taking multiple senna tablets throughout the day combined with lubiprostone BID  Sheela Ordonez states he is not interested in [place]  Sheela Ordonez expresses interest in taking Dulcolax  We discussed adding Dulcolax and diminishing senna    We discussed taking a suppository  Will add Colace, which he has not been taking  We also discussed adding lactulose solution if he continues to have refractory constipation  Scripts adjusted accordingly  Svetlana Koch also endorses fatigue and increased shortness of breath  He denies fevers or chills  He denies leg edema  He denies cough or sputum production  I suspect severe constipation with pancreatic mass may be contributing to his shortness of breath  Will prioritize having a bowel movement  Svetlana Koch states his appetite is limited  He has been taking boost nutritional supplements  He states his gastroenterologist has placed him on a fiber restricted soft diet, and he is not comfortable taking more than a liquid diet at the time due to severe constipation  He does have mild nausea which is relieved with Zofran  Refill sent at this time  Regarding Wally's pain, he states that the oxycodone 5mg only minimally alleviates his pain  He states he takes it 3 times daily  We discussed increasing dosing to 7 5mg Q 4-6H PRN and Svetlana Koch is agreeable  Discussed his concerns regarding tolerance and explained possibility for future opioid rotation should this develop  Svetlana Koch was noted to have low blood pressure during the intake  I rechecked his blood pressure at the end of the visit to confirm  Svetlana Koch states he has a BP cuff at home  I encouraged him to take his blood pressure daily  We discussed values of concern and when to hold his lisinopril and call his cardiologist     At this time, Wally's goal is to initiate cancer treatments  Further advance care planning not addressed at this visit  Recommend close follow-up to monitor Wally's symptoms, approximately 3 weeks  Encouraged however to call with any questions or concerns      Social:   Slim Patel,  46 years] [phone]   - two adult children              - Jimmy Borjas [phone]              - Rj Vazquez, resides in [address] grandchildren [aged 10 + 10 (twins], 9, and 8]   - resides with spouse, currently with 24/7 paid care giver support   - PhD in organic chemistry, retired, previous work with [employer] x 22 years        Past medical, surgical, social, and family histories are reviewed and pertinent updates are made  Review of Systems   Constitutional: Positive for decreased appetite  Negative for malaise/fatigue  HENT: Negative for congestion and sore throat  Cardiovascular: Positive for leg swelling  Negative for chest pain and cyanosis  Respiratory: Negative for cough and shortness of breath  Skin: Negative for color change and dry skin  Musculoskeletal: Negative for back pain  Gastrointestinal: Positive for bloating, abdominal pain, anorexia, change in bowel habit, constipation and nausea  Negative for excessive appetite and vomiting  Psychiatric/Behavioral: Negative for altered mental status and hallucinations  The patient has insomnia  Vital Signs    /70 (BP Location: Left arm, Patient Position: Sitting, Cuff Size: Standard)   Pulse 102   Temp (!) 96 5 °F (35 8 °C) (Temporal)   Resp 18   Wt 77 1 kg (170 lb)   SpO2 97%   BMI 25 85 kg/m²     Physical Exam and Objective Data  Physical Exam  Vitals and nursing note reviewed  Exam conducted with a chaperone present  Constitutional:       Appearance: He is well-developed  He is obese  He is not ill-appearing  HENT:      Head: Normocephalic and atraumatic  Eyes:      Conjunctiva/sclera: Conjunctivae normal    Pulmonary:      Effort: Pulmonary effort is normal  No respiratory distress  Musculoskeletal:      Cervical back: Neck supple  Right lower leg: Edema (3+) present  Left lower leg: Edema (3+) present  Skin:     General: Skin is warm and dry  Coloration: Skin is pale  Neurological:      Mental Status: He is alert and oriented to person, place, and time  Mental status is at baseline  Radiology and Laboratory:   I personally reviewed and interpreted the following results:  CT imaging of abdomen and pelvis    40 minutes was spent face to face with Иван Covarrubias and his daughter Morris Bean with greater than 50% of the time spent in counseling or coordination of care including discussions of risks and benefits of treatment, treatment instructions, follow up requirements, compliance with treatment regimen and symptom management, signs of concern for possible developing bowel obstruction, time spent answering questions extensively  Additional time was also spent in discussing coronavirus vaccine indications, availability, and logistical challenges  All of the patient's or agent's questions were answered during this discussion

## 2022-03-24 NOTE — PROGRESS NOTES
[id number] - Outpatient Consultation  Thomasena Prader 67 y o  male MRN: [id number]  Encounter: [id number]    ASSESSMENT AND PLAN:      1  Blood loss anemia  New complaint  In the setting of chemotherapy for pancreatic cancer  Baseline hemoglobin 12 4 October 2021, down to 10 2 in January, and 6 6 March 21st   Stools are Hemoccult positive  Iron panel from February most consistent with anemia of chronic disease Suspect anemia is multifactorial from chemotherapy and GI blood loss  No obvious upper GI bleeding seen on recent EUS/ERCP  Last colonoscopy more than 10 years ago    Discussed treatment options  Plan transfusion as scheduled by his oncologist  Will arrange EGD to assess for bleeding source and assess for ongoing duodenal stricture seen on ERCP  We discussed colonoscopy but he does not feel he could tolerate the prep  Will plan flexible sigmoidoscopy at time of EGD      2  Malignant neoplasm of head of pancreas [facility])  Due for 3rd round of chemotherapy starting tomorrow  Imaging is planned afterwards  Will assess duodenal stricture at time of EGD  He may benefit from duodenal stent  3  Constipation  Continue current regimen  He is currently using Percocet to assist with sleep  He will discuss an alternate medication for insomnia with his palliative care PA later today      Followup Appointment:  Pending EGD/flex sig  ______________________________________________________________________    Chief Complaint   Patient presents with    Anemia, blood in the stool       HPI:   Thomasena Prader is a 67y o  year old male who presents for new complaint of anemia and Hemoccult-positive stools  He was seen in the office by Dr Alistair Fuchs in January and was diagnosed with pancreatic cancer  He underwent dilation of a duodenal stricture and metal stent placement in the bile duct at [facility] January 20th    He has been following up with Oncology and is due to receive his 3rd round of chemotherapy tomorrow  He has ongoing issues with nausea  He is taking Zofran and Reglan every 6 hours  He can only tolerate liquids  He has not vomited  He has been dealing with constipation and adjusting his bowel regimen  He plans to address this with his palliative care specialist later today, he has been using Percocet to help fall asleep but would like an alternate medication  He was found to have progressive anemia with recent hemoglobin of 6 6 earlier this week  He has not noted blood in the stools but stools are Hemoccult positive  He denies any localizing symptoms like reflux, dysphagia, melena or rectal bleeding      Historical Information   Past Medical History:   Diagnosis Date    CAD (coronary artery disease)     GERD (gastroesophageal reflux disease)     Hyperlipidemia     Hypertension     [facility] [address] [facility]     [facility]      Past Surgical History:   Procedure Laterality Date    CHOLECYSTECTOMY      CORONARY ANGIOPLASTY WITH STENT PLACEMENT      IR PORT PLACEMENT  2/10/2022     Social History     Substance and Sexual Activity   Alcohol Use Not Currently     Social History     Substance and Sexual Activity   Drug Use Never     Social History     Tobacco Use   Smoking Status Never Smoker   Smokeless Tobacco Never Used     Family History   Problem Relation Age of Onset    Pancreatic cancer Mother     Dementia Father     Esophageal cancer Sister        Meds/Allergies     Current Outpatient Medications:     aspirin (ECOTRIN LOW STRENGTH) 81 mg EC tablet    bisacodyl (DULCOLAX) 10 mg suppository    cetirizine (ZyrTEC) 10 mg tablet    lidocaine-prilocaine (EMLA) cream    metoclopramide (REGLAN) 5 mg tablet    ondansetron (ZOFRAN) 4 mg tablet    oxyCODONE (ROXICODONE) 5 immediate release tablet    pantoprazole (PROTONIX) 20 mg tablet    polyethylene glycol (MIRALAX) 17 g packet    senna (SENOKOT) 8 6 mg    bisacodyl (FLEET) 10 MG/30ML ENEM   lisinopril (ZESTRIL) 10 mg tablet    No Known Allergies    PHYSICAL EXAM:    Blood pressure 118/82, pulse 74, height 5' 8" (1 727 m), weight 76 7 kg (169 lb)  Body mass index is 25 7 kg/m²  General Appearance: NAD, cooperative, alert  Eyes: Anicteric, PERRLA, EOMI  ENT:  Normocephalic, atraumatic, normal mucosa  Neck:  Supple, symmetrical, trachea midline,   Resp:  Clear to auscultation bilaterally; no rales, rhonchi or wheezing; respirations unlabored   CV:  S1 S2, Regular rate and rhythm; no murmur, rub, or gallop  GI:  Soft, non-tender, non-distended; normal bowel sounds; no masses, no organomegaly   Rectal: Deferred  Musculoskeletal: No cyanosis, clubbing or edema  Normal ROM  Skin:  No jaundice, rashes, or lesions   Heme/Lymph: No palpable cervical lymphadenopathy  Psych: Normal affect, good eye contact  Neuro: No gross deficits, AAOx3    Lab Results:   Lab Results   Component Value Date    WBC 5 32 03/21/2022    HGB 6 6 (LL) 03/21/2022    HCT 21 0 (L) 03/21/2022    MCV 96 03/21/2022     03/21/2022     Lab Results   Component Value Date    K 3 4 (L) 03/17/2022    CL 95 (L) 03/17/2022    CO2 30 03/17/2022    BUN 13 03/17/2022    CREATININE 1 02 03/17/2022    GLUF 164 (H) 03/03/2022    CALCIUM 8 3 03/17/2022    CORRECTEDCA 9 6 03/17/2022    AST 29 03/17/2022    ALT 21 03/17/2022    ALKPHOS 162 (H) 03/17/2022    EGFR 70 03/17/2022     Lab Results   Component Value Date    IRON 42 (L) 02/25/2022    TIBC 199 (L) 02/25/2022    FERRITIN 911 (H) 02/25/2022     No results found for: LIPASE    Radiology Results:   No results found  REVIEW OF SYSTEMS:    CONSTITUTIONAL: Denies any fever, chills, rigors, and weight loss  HEENT: No earache or tinnitus  Denies hearing loss or visual disturbances  CARDIOVASCULAR: No chest pain or palpitations  RESPIRATORY: Denies any cough, hemoptysis, shortness of breath or dyspnea on exertion  GASTROINTESTINAL: As noted in the History of Present Illness  GENITOURINARY: No problems with urination  Denies any hematuria or dysuria  NEUROLOGIC: No dizziness or vertigo, denies headaches  MUSCULOSKELETAL: Denies any muscle or joint pain  SKIN: Denies skin rashes or itching  ENDOCRINE: Denies excessive thirst  Denies intolerance to heat or cold  PSYCHOSOCIAL: Denies depression or anxiety  Denies any recent memory loss

## 2022-03-24 NOTE — H&P (VIEW-ONLY)
[id number] - Outpatient Consultation  Narda Lora 67 y o  male MRN: [id number]  Encounter: [id number]    ASSESSMENT AND PLAN:      1  Blood loss anemia  New complaint  In the setting of chemotherapy for pancreatic cancer  Baseline hemoglobin 12 4 October 2021, down to 10 2 in January, and 6 6 March 21st   Stools are Hemoccult positive  Iron panel from February most consistent with anemia of chronic disease Suspect anemia is multifactorial from chemotherapy and GI blood loss  No obvious upper GI bleeding seen on recent EUS/ERCP  Last colonoscopy more than 10 years ago    Discussed treatment options  Plan transfusion as scheduled by his oncologist  Will arrange EGD to assess for bleeding source and assess for ongoing duodenal stricture seen on ERCP  We discussed colonoscopy but he does not feel he could tolerate the prep  Will plan flexible sigmoidoscopy at time of EGD      2  Malignant neoplasm of head of pancreas [facility])  Due for 3rd round of chemotherapy starting tomorrow  Imaging is planned afterwards  Will assess duodenal stricture at time of EGD  He may benefit from duodenal stent  3  Constipation  Continue current regimen  He is currently using Percocet to assist with sleep  He will discuss an alternate medication for insomnia with his palliative care PA later today      Followup Appointment:  Pending EGD/flex sig  ______________________________________________________________________    Chief Complaint   Patient presents with    Anemia, blood in the stool       HPI:   Narda Lora is a 67y o  year old male who presents for new complaint of anemia and Hemoccult-positive stools  He was seen in the office by Dr David Bunch in January and was diagnosed with pancreatic cancer  He underwent dilation of a duodenal stricture and metal stent placement in the bile duct at [facility] January 20th    He has been following up with Oncology and is due to receive his 3rd round of chemotherapy tomorrow  He has ongoing issues with nausea  He is taking Zofran and Reglan every 6 hours  He can only tolerate liquids  He has not vomited  He has been dealing with constipation and adjusting his bowel regimen  He plans to address this with his palliative care specialist later today, he has been using Percocet to help fall asleep but would like an alternate medication  He was found to have progressive anemia with recent hemoglobin of 6 6 earlier this week  He has not noted blood in the stools but stools are Hemoccult positive  He denies any localizing symptoms like reflux, dysphagia, melena or rectal bleeding      Historical Information   Past Medical History:   Diagnosis Date    CAD (coronary artery disease)     GERD (gastroesophageal reflux disease)     Hyperlipidemia     Hypertension     [facility] [address]     [facility]      Past Surgical History:   Procedure Laterality Date    CHOLECYSTECTOMY      CORONARY ANGIOPLASTY WITH STENT PLACEMENT      IR PORT PLACEMENT  2/10/2022     Social History     Substance and Sexual Activity   Alcohol Use Not Currently     Social History     Substance and Sexual Activity   Drug Use Never     Social History     Tobacco Use   Smoking Status Never Smoker   Smokeless Tobacco Never Used     Family History   Problem Relation Age of Onset    Pancreatic cancer Mother     Dementia Father     Esophageal cancer Sister        Meds/Allergies     Current Outpatient Medications:     aspirin (ECOTRIN LOW STRENGTH) 81 mg EC tablet    bisacodyl (DULCOLAX) 10 mg suppository    cetirizine (ZyrTEC) 10 mg tablet    lidocaine-prilocaine (EMLA) cream    metoclopramide (REGLAN) 5 mg tablet    ondansetron (ZOFRAN) 4 mg tablet    oxyCODONE (ROXICODONE) 5 immediate release tablet    pantoprazole (PROTONIX) 20 mg tablet    polyethylene glycol (MIRALAX) 17 g packet    senna (SENOKOT) 8 6 mg    bisacodyl (FLEET) 10 MG/30ML ENEM   lisinopril (ZESTRIL) 10 mg tablet    No Known Allergies    PHYSICAL EXAM:    Blood pressure 118/82, pulse 74, height 5' 8" (1 727 m), weight 76 7 kg (169 lb)  Body mass index is 25 7 kg/m²  General Appearance: NAD, cooperative, alert  Eyes: Anicteric, PERRLA, EOMI  ENT:  Normocephalic, atraumatic, normal mucosa  Neck:  Supple, symmetrical, trachea midline,   Resp:  Clear to auscultation bilaterally; no rales, rhonchi or wheezing; respirations unlabored   CV:  S1 S2, Regular rate and rhythm; no murmur, rub, or gallop  GI:  Soft, non-tender, non-distended; normal bowel sounds; no masses, no organomegaly   Rectal: Deferred  Musculoskeletal: No cyanosis, clubbing or edema  Normal ROM  Skin:  No jaundice, rashes, or lesions   Heme/Lymph: No palpable cervical lymphadenopathy  Psych: Normal affect, good eye contact  Neuro: No gross deficits, AAOx3    Lab Results:   Lab Results   Component Value Date    WBC 5 32 03/21/2022    HGB 6 6 (LL) 03/21/2022    HCT 21 0 (L) 03/21/2022    MCV 96 03/21/2022     03/21/2022     Lab Results   Component Value Date    K 3 4 (L) 03/17/2022    CL 95 (L) 03/17/2022    CO2 30 03/17/2022    BUN 13 03/17/2022    CREATININE 1 02 03/17/2022    GLUF 164 (H) 03/03/2022    CALCIUM 8 3 03/17/2022    CORRECTEDCA 9 6 03/17/2022    AST 29 03/17/2022    ALT 21 03/17/2022    ALKPHOS 162 (H) 03/17/2022    EGFR 70 03/17/2022     Lab Results   Component Value Date    IRON 42 (L) 02/25/2022    TIBC 199 (L) 02/25/2022    FERRITIN 911 (H) 02/25/2022     No results found for: LIPASE    Radiology Results:   No results found  REVIEW OF SYSTEMS:    CONSTITUTIONAL: Denies any fever, chills, rigors, and weight loss  HEENT: No earache or tinnitus  Denies hearing loss or visual disturbances  CARDIOVASCULAR: No chest pain or palpitations  RESPIRATORY: Denies any cough, hemoptysis, shortness of breath or dyspnea on exertion  GASTROINTESTINAL: As noted in the History of Present Illness  GENITOURINARY: No problems with urination  Denies any hematuria or dysuria  NEUROLOGIC: No dizziness or vertigo, denies headaches  MUSCULOSKELETAL: Denies any muscle or joint pain  SKIN: Denies skin rashes or itching  ENDOCRINE: Denies excessive thirst  Denies intolerance to heat or cold  PSYCHOSOCIAL: Denies depression or anxiety  Denies any recent memory loss

## 2022-03-25 NOTE — TELEPHONE ENCOUNTER
Received notification from Reinaldo Sanders  On 3/25/22 that Yessy Hinkle is appropriate for oncology nutrition care  Called pt, introduced self and explained reason for call and nutritional services available for him  Yessycarine Hinkle had questions on what to eat since he is currently able to only tolerate liquid diet  Provided information on the importance of diet during cancer treatments, suggested different food options, and the ways to modify his usual meals to suit high calorie/high protein liquid diet  Will also send educational materials to Yessy Hinkle per his request  Yessy Hinkle expressed interest in scheduling an appt with RD  Appt scheduled on 4/6/22 at 10 am  Yessycarine Hinkle has RD contact information and was encouraged to call if he has any questions/concerns 
No

## 2022-03-25 NOTE — PROGRESS NOTES
Patient completed chemotherapy infusion with no adverse reactions  Neulsta Onpro applied to back of left arm  Reviewed with patient and daughter when to remove device  AVS provided, left unit ambulatory with steady gait

## 2022-03-25 NOTE — TELEPHONE ENCOUNTER
Scheduled on Dr Katherine Tavarez rounding schedule for 3/31- tentatively for 2pm   Confirmed with Linda at [facility] ([place], [place])

## 2022-03-26 NOTE — TELEPHONE ENCOUNTER
Patient's Daughter called regarding her Father Drank a Nutrition Drink and it feels like it's Stuck, not going down  Not doing so well today

## 2022-03-28 NOTE — TELEPHONE ENCOUNTER
Called and spoke to pt regarding dysphagia symptoms which he states has improved since his most recent chemo and is better today  He is tolerating drinking his ensure shakes  Pt is having an upper endoscopy done this Thursday and says his problems are all lower GI and not upper GI or with swallowing  Advised based on the endoscopy report we may recommend ordering a barium swallow based on symptoms with dysphagia moving forward  Pt verbalized good understanding of this

## 2022-03-28 NOTE — TELEPHONE ENCOUNTER
Let's follow up and see how he is feeling  I he scheduled to meet with RD on 4/6   If he is still having dysphagia we can set him up for barium swallow study

## 2022-03-30 NOTE — TELEPHONE ENCOUNTER
Pt was who answered- He was asking his procedure time  Advised him typically he should receive a call around this time in the evening to verify what time to arrive at the facility  Advised him if he does not hear by 830 to call back and we can investigate

## 2022-03-30 NOTE — TELEPHONE ENCOUNTER
Regarding: procedure prep questions   ----- Message from Debora Reed RN sent at 3/30/2022  5:50 PM EDT -----  "My dad has a procedure tomorrow and he has questions about the time and prep instructions " Daughter asks that someone call patient back directly

## 2022-03-31 NOTE — ANESTHESIA PREPROCEDURE EVALUATION
Procedure:  PRE-OP ONLY    Relevant Problems   CARDIO   (+) Coronary artery disease involving native coronary artery of native heart without angina pectoris   (+) History of PTCA   (+) Primary hypertension   (+) Pure hypercholesterolemia      GI/HEPATIC   (+) GERD (gastroesophageal reflux disease)   (+) Malignant neoplasm of head of pancreas (HCC)      /RENAL   (+) Prostate cancer (HCC)   (+) Stage 3 chronic kidney disease (HCC)      HEMATOLOGY   (+) Blood loss anemia      Other   (+) Chemotherapy induced neutropenia (HCC)   (+) Ischemic cardiomyopathy   (+) 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 (HCC)   (+) Palliative care patient   (+) Pancreatic mass             Anesthesia Plan  ASA Score- 3     Anesthesia Type- IV sedation with anesthesia with ASA Monitors  Additional Monitors:   Airway Plan:           Plan Factors-    Chart reviewed  Patient summary reviewed  Patient is not a current smoker  Patient did not smoke on day of surgery  Induction- intravenous      Postoperative Plan-     Informed Consent-

## 2022-03-31 NOTE — ANESTHESIA POSTPROCEDURE EVALUATION
Post-Op Assessment Note    CV Status:  Stable  Pain Score: 0    Pain management: adequate     Mental Status:  Alert and awake   Hydration Status:  Euvolemic   PONV Controlled:  Controlled   Airway Patency:  Patent      Post Op Vitals Reviewed: Yes      Staff: CRNA         No complications documented      /59 (03/31/22 1421)    Temp      Pulse 97 (03/31/22 1421)   Resp 16 (03/31/22 1421)    SpO2 95 % (03/31/22 1421)

## 2022-03-31 NOTE — TELEPHONE ENCOUNTER
Reason for Disposition   Information only question and nurse able to answer    Answer Assessment - Initial Assessment Questions  1  REASON FOR CALL or QUESTION: "What is your reason for calling today?" or "How can I best help you?" or "What question do you have that I can help answer?"      " I want to know if I could have apple juice, it is on the list but I wanted to make sure "    Pt is on a clear liquid diet and is able to have fluids up until 3 hours before his EGD  Pt made aware he is able to have apple juice      Protocols used: INFORMATION ONLY CALL - NO TRIAGE-ADULT-OH

## 2022-03-31 NOTE — TELEPHONE ENCOUNTER
Regarding: apple juice- GI EGD & FLEXIBLE SIGMOIDOSCOPY today up to 3hrs prior?  ----- Message from Carin Orellana sent at 3/31/2022  8:37 AM EDT -----  " Would I be able to drink apple juice as a clear liquid before GI EGD & FLEXIBLE SIGMOIDOSCOPY today "

## 2022-03-31 NOTE — ANESTHESIA PREPROCEDURE EVALUATION
Procedure:  FLEXIBLE SIGMOIDOSCOPY  EGD    Relevant Problems   CARDIO   (+) Coronary artery disease involving native coronary artery of native heart without angina pectoris   (+) History of PTCA   (+) Primary hypertension   (+) Pure hypercholesterolemia      GI/HEPATIC   (+) GERD (gastroesophageal reflux disease)   (+) Malignant neoplasm of head of pancreas (HCC)      /RENAL   (+) Prostate cancer (HCC)   (+) Stage 3 chronic kidney disease (HCC)      HEMATOLOGY   (+) Blood loss anemia        Physical Exam    Airway    Mallampati score: II  TM Distance: >3 FB  Neck ROM: full     Dental   No notable dental hx     Cardiovascular  Cardiovascular exam normal    Pulmonary  Pulmonary exam normal     Other Findings        Anesthesia Plan  ASA Score- 3     Anesthesia Type- IV sedation with anesthesia with ASA Monitors  Additional Monitors:   Airway Plan:     Comment:  am , apple juice  Plan Factors-    Chart reviewed  Patient summary reviewed  Patient is not a current smoker  Induction- intravenous  Postoperative Plan-     Informed Consent- Anesthetic plan and risks discussed with patient and daughter  I personally reviewed this patient with the CRNA  Discussed and agreed on the Anesthesia Plan with the CRNA  Kim Solomon

## 2022-03-31 NOTE — INTERVAL H&P NOTE
H&P reviewed  After examining the patient I find no changes in the patients condition since the H&P had been written      Vitals:    03/31/22 1309   BP: 132/76   Pulse: (!) 110   Temp: 98 1 °F (36 7 °C)   SpO2: 98%

## 2022-04-01 NOTE — PROGRESS NOTES
Hematology/Oncology Outpatient Follow- up Note  Robbie Hall, 1945, [id number]  4/4/2022        Chief Complaint   Patient presents with    Follow-up       HPI:  Robbie Hall is a 48-year-old male with metastatic pancreatic cancer  Patient was having symptoms of progressive epigastric pain associated with constipation and weight loss  Workup demonstrated a large pancreatic head mass, periaortic and periportal adenopathy along with multiple liver metastases  He underwent ERCP with stent placement of biliary stricture on 01/20/2022  EUS with biopsy of both the liver lesion and the pancreatic mass revealed adenocarcinoma consistent with metastatic pancreatic cancer  He has an underlying in [address] mutation    Patient was evaluated by [facility] and recommended treatment with palliative chemotherapy with gemcitabine and oxaliplatin  He was seen in consultation by Dr Jody Hood and consented to treatment with gem ox  He began treatment on 2/4/22      Previous Hematologic/ Oncologic History:    Oncology History   Malignant neoplasm of head of pancreas ([facility] )   2/1/2022 Initial Diagnosis    Malignant neoplasm of head of pancreas ([facility] [address] )     2/4/2022 -  Chemotherapy    pegfilgrastim (Divya Maser), 6 mg, Subcutaneous, Once, 3 of 6 cycles  Administration: 6 mg (2/11/2022), 6 mg (3/4/2022), 6 mg (3/25/2022)  gemcitabine (GEMZAR) infusion, 1,990 1 mg, Intravenous, Once, 3 of 6 cycles  Administration: 2,000 mg (2/4/2022), 2,000 mg (2/11/2022), 2,000 mg (2/25/2022), 2,000 mg (3/4/2022), 2,000 mg (3/18/2022), 2,000 mg (3/25/2022)  oxaliplatin (ELOXATIN) chemo infusion, 85 mg/m2 = 169 15 mg (85 % of original dose 100 mg/m2), Intravenous, Once, 3 of 6 cycles  Dose modification: 85 mg/m2 (original dose 100 mg/m2, Cycle 1, Reason: Performance Status), 65 mg/m2 (original dose 100 mg/m2, Cycle 4, Reason: Dose Not Tolerated, Comment: Neuropathy )  Administration: 169 15 mg (2/4/2022), 164 9 mg (2/25/2022), 162 35 mg (3/18/2022)         Current Hematologic/ Oncologic Treatment:    Gemcitabine 1000 mg/m2 on days 1 and 8; Oxaliplatin 85 mg/m2 on day 1  Neulasta growth factor support on day number 9  Standing blood transfusion order     ECO - Symptomatic, <50% confined to bed    Interval History:  The patient presents for routine follow up  He has completed 3 cycles of chemo  He has been having hemoccult positive stools and required blood transfusion on 3/21 for Hgb 6 6  This did improve on 3/24 to 9 6  He recently underwent EGD and flex sig on 3/31 with no source of GI blood loss identified  However, the area was obscured by clot  Inflamed malignant stricture at the distal bulb was obscured by clot and fluid and was not traversed on EGD  He was also noted to have fluid and semi solid food in the in the stomach and duodenum  He needs palliative duodenal stent, can not be done as outpatient until 4/15  Patient arrived via [facility] along with his daughter  He appears weak and pale  He states he can not eat  Feels nauseous, constipated and bloated  He is due for next round of chemo this Friday but does not feel he can handle this  I discussed admission at [facility] for evaluation and inpatient EUS/ERCP  He is agreeable  Cancer Staging:  Cancer Staging  No matching staging information was found for the patient  Molecular Testing:             Test Results:    Imaging: EGD    Result Date: 3/31/2022  Narrative: [address] [facility] [address] [phone] DATE OF SERVICE: 3/31/22 PHYSICIAN(S): Attending: Homer Tate DO Fellow: No Staff Documented INDICATION: Blood loss anemia POST-OP DIAGNOSIS: See the impression below  PREPROCEDURE: Informed consent was obtained for the procedure, including sedation  Risks of perforation, hemorrhage, adverse drug reaction and aspiration were discussed  The patient was placed in the left lateral decubitus position   Patient was explained about the risks and benefits of the procedure  Risks including but not limited to bleeding, infection, and perforation were explained in detail  Also explained about less than 100% sensitivity with the exam and other alternatives  DETAILS OF PROCEDURE: Patient was taken to the procedure room where a time out was performed to confirm correct patient and correct procedure  The patient underwent monitored anesthesia care, which was administered by an anesthesia professional  The patient's blood pressure, heart rate, level of consciousness, respirations and oxygen were monitored throughout the procedure  The scope was advanced to the first part of the duodenum  Retroflexion was performed in the fundus  The patient experienced no blood loss  The procedure was not difficult  The patient tolerated the procedure well  There were no apparent complications  ANESTHESIA INFORMATION: ASA: III Anesthesia Type: IV Sedation with Anesthesia MEDICATIONS: No administrations occurring from 1353 to 1421 on 03/31/22 FINDINGS: The esophagus appeared normal  The stomach appeared normal  450 cc of fluid and semi solid food suctioned from the stomach and duodenum Malignant-appearing and inflamed stricture in the 1st part of the duodenum  Not traversed, area obscured by clot and fluid SPECIMENS: * No specimens in log *     Impression: Normal esophagus 450 cc of fluid and semi solid food suctioned from the stomach and duodenum Inflamed malignant stricture at the distal bulb, corresponding to area that was dilated on recent ERCP    The area was obscured by clot and fluid and was not traversed RECOMMENDATION: Will communicate with advanced endoscopist at [place] that did his EUS/ERCP about palliative duodenal stent   María Casas, DO     Flexible Sigmoidoscopy    Result Date: 3/31/2022  Narrative: [address] [facility] [id number] [address] [facility] [address]: 3/31/22 PHYSICIAN(S): Attending: Filiberto Calzada DO Fellow: No Staff Documented INDICATION: Blood loss anemia POST-OP DIAGNOSIS: See the impression below  HISTORY: Prior colonoscopy: More than 10 years ago  BOWEL PREPARATION: Enema PREPROCEDURE: Informed consent was obtained for the procedure, including sedation  Risks including but not limited to bleeding, infection, perforation, adverse drug reaction and aspiration were explained in detail  Also explained about less than 100% sensitivity with the exam and other alternatives  The patient was placed in the left lateral decubitus position  DETAILS OF PROCEDURE: Patient was taken to the procedure room where a time out was performed to confirm correct patient and correct procedure  The patient underwent monitored anesthesia care, which was administered by an anesthesia professional  The patient's blood pressure, heart rate, level of consciousness, oxygen and respirations were monitored throughout the procedure  A digital rectal exam was performed  The scope was introduced through the anus and advanced to the splenic flexure  Retroflexion was performed in the rectum  The quality of bowel preparation was evaluated using the [facility] Bowel Preparation Scale with scores of: right colon = not assessed, transverse colon = not assessed, left colon = 3  The patient experienced no blood loss  The procedure was not difficult  The patient tolerated the procedure well  There were no apparent complications  The total BBPS score was 3   ANESTHESIA INFORMATION: ASA: III Anesthesia Type: IV Sedation with Anesthesia MEDICATIONS: No administrations occurring from 1353 to 1421 on 03/31/22 FINDINGS: All observed locations appeared normal  Small, internal hemorrhoids EVENTS: Procedure Events Event Event Time ENDO SCOPE OUT TIME 3/31/2022  2:06 PM ENDO SCOPE OUT TIME 3/31/2022  2:14 PM SPECIMENS: * No specimens in log * EQUIPMENT: Colonoscope -     Impression: Small internal hemorrhoids, otherwise normal exam up to splenic flexure with no source of GI blood loss RECOMMENDATION: No further screening sigmoidoscopies necessary due to age (age = 77 or greater)    Katie Dimmer, DO       Labs:   Lab Results   Component Value Date    WBC 3 67 (L) 03/24/2022    HGB 9 6 (L) 03/24/2022    HCT 29 3 (L) 03/24/2022    MCV 90 03/24/2022     03/24/2022     Lab Results   Component Value Date    K 3 9 03/24/2022    CL 94 (L) 03/24/2022    CO2 30 03/24/2022    BUN 14 03/24/2022    CREATININE 0 99 03/24/2022    GLUF 164 (H) 03/03/2022    CALCIUM 8 9 03/24/2022    CORRECTEDCA 10 3 (H) 03/24/2022    AST 44 03/24/2022    ALT 30 03/24/2022    ALKPHOS 151 (H) 03/24/2022    EGFR 73 03/24/2022           Review of Systems      Active Problems:   Patient Active Problem List   Diagnosis    Pain of upper abdomen    Change in bowel habit    Prostate cancer ([facility] )    BRCA gene mutation positive in male    Coronary artery disease involving native coronary artery of native heart without angina pectoris    Pancreatic mass    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    Pure hypercholesterolemia    Primary hypertension    Blood loss anemia    Stage 3 chronic kidney disease (HCC)    Ischemic cardiomyopathy    History of PTCA    GERD (gastroesophageal reflux disease)    Palliative care patient    Malignant neoplasm of head of pancreas ([facility] )    Chemotherapy induced neutropenia ([facility] )    Dehydration       Past Medical History:   Past Medical History:   Diagnosis Date    CAD (coronary artery disease)     GERD (gastroesophageal reflux disease)     Hyperlipidemia     Hypertension     Pancreatic cancer (HCC)     Prostate cancer ([facility] )        Surgical History:   Past Surgical History:   Procedure Laterality Date    CHOLECYSTECTOMY      CORONARY ANGIOPLASTY WITH STENT PLACEMENT      IR PORT PLACEMENT  2/10/2022       Family History:    Family History   Problem Relation Age of Onset    Pancreatic cancer Mother     Dementia Father    Beletty Providence Esophageal cancer Sister        Cancer-related family history includes Esophageal cancer in his sister; Pancreatic cancer in his mother  Social History:   Social History     Socioeconomic History    Marital status: /Civil Union     Spouse name: Not on file    Number of children: Not on file    Years of education: Not on file    Highest education level: Not on file   Occupational History    Not on file   Tobacco Use    Smoking status: Never Smoker    Smokeless tobacco: Never Used   Vaping Use    Vaping Use: Never used   Substance and Sexual Activity    Alcohol use: Not Currently    Drug use: Never    Sexual activity: Not Currently   Other Topics Concern    Not on file   Social History Narrative    Not on file     Social Determinants of Health     Financial Resource Strain: Not on file   Food Insecurity: Not on file   Transportation Needs: Not on file   Physical Activity: Not on file   Stress: Not on file   Social Connections: Not on file   Intimate Partner Violence: Not on file   Housing Stability: Not on file       Current Medications:   Current Outpatient Medications   Medication Sig Dispense Refill    aspirin (ECOTRIN LOW STRENGTH) 81 mg EC tablet Take 81 mg by mouth daily      bisacodyl (DULCOLAX) 10 mg suppository Insert 1 suppository (10 mg total) into the rectum daily as needed for constipation 12 suppository 0    cetirizine (ZyrTEC) 10 mg tablet Take 10 mg by mouth      Elastic Bandages & Supports (Medical Compression Socks) MISC Use in the morning 20 - 30mmHG  If possible, please use compression socks with high pressure near foot/ankle (gradated)   2 each 0    mirtazapine (REMERON) 7 5 MG tablet Take 1 tablet (7 5 mg total) by mouth daily at bedtime 30 tablet 0    ondansetron (ZOFRAN) 4 mg tablet Take 1 tablet (4 mg total) by mouth every 8 (eight) hours as needed for nausea or vomiting 90 tablet 0    oxyCODONE (ROXICODONE) 5 immediate release tablet Take 1 - 2 tablets PO Q6H PRN pain  120 tablet 0    pantoprazole (PROTONIX) 20 mg tablet       polyethylene glycol (MIRALAX) 17 g packet Take 17 g by mouth daily 10 each 0    senna (SENOKOT) 8 6 mg Take 1 tablet (8 6 mg total) by mouth daily at bedtime as needed for constipation 30 tablet 0    bisacodyl (FLEET) 10 MG/30ML ENEM Insert 30 mL (10 mg total) into the rectum once for 1 dose If lactulose or suppository not helpful 30 mL 0    lisinopril (ZESTRIL) 10 mg tablet  (Patient not taking: Reported on 2/25/2022 )       No current facility-administered medications for this visit  Allergies: No Known Allergies    Physical Exam:  /70 (BP Location: Left arm, Patient Position: Sitting, Cuff Size: Adult)   Pulse 96   Temp (!) 96 8 °F (36 °C) (Temporal)   Resp 14   Ht 5' 7 99" (1 727 m)   Wt 74 4 kg (164 lb)   SpO2 98%   BMI 24 94 kg/m²   Body surface area is 1 88 meters squared  Wt Readings from Last 3 Encounters:   04/04/22 74 4 kg (164 lb)   03/25/22 77 3 kg (170 lb 6 4 oz)   03/24/22 77 1 kg (170 lb)           Physical Exam    Assessment / Plan:    1  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 ([address] )    2  Malignant neoplasm of head of pancreas ([facility] [address] )    3  BRCA gene mutation positive in male      The patient is a 25-year-old male with metastatic pancreatic cancer  Patient was having symptoms of progressive epigastric pain associated with constipation and weight loss  Workup demonstrated a large pancreatic head mass, periaortic and periportal adenopathy along with multiple liver metastases  He underwent ERCP with stent placement of biliary stricture on 01/20/2022  EUS with biopsy of both the liver lesion and the pancreatic mass revealed adenocarcinoma consistent with metastatic pancreatic cancer  He has an underlying in [address] mutation    Patient was evaluated by [address] [place] [address] and recommended treatment with palliative chemotherapy with gemcitabine and oxaliplatin    He was seen in consultation by Dr Cher Sutton and consented to treatment with gem ox  He began treatment on 2/4/22  He has completed 3 cycles of chemo  He has had hemoccult positive stools, symptomatic anemia, ongoing issues with nausea and now is unable to eat any solid food  Barley getting fluids in  Recent EGD was attempted and he was found to have an Inflamed malignant stricture at the distal bulb was obscured by clot and fluid and was not traversed on EGD  He was also noted to have fluid and semi solid food in the in the stomach and duodenum  He will require palliative duodenal stent placement  He is weak, pale  Since he is unable to have EUS/ERCP done as an outpatient urgently, I have recommended ED eval at [facility] for admission for further evaluation of his obstructive symptoms  He needs labs, possible blood transfusion, imaging and hopefully stent placement  Patient and his daughter are in agreement with this recommendation  Transfer orders placed  They will proceed to [facility] ED for evaluation  Goals and Barriers:  Current Goal:  Prolong Survival from metastatic pancreatic cancer   Barriers: None  Patient's Capacity to Self Care:  Patient able to self care  Portions of the record may have been created with voice recognition software  Occasional wrong word or "sound a like" substitutions may have occurred due to the inherent limitations of voice recognition software  Read the chart carefully and recognize, using context, where substitutions have occurred

## 2022-04-01 NOTE — TELEPHONE ENCOUNTER
Patients GI provider:  Dr Ren Marcelo    Number to return call: [phone] and if she is not available at this number, would like for you to call [phone]    Reason for call: Pt's daughter calling in regards to scheduling procedure for pt to have tube and stent placed  She states she was told to schedule this with Dr Ren Marcelo  Pt's daughter can be reached at numbers above      Scheduled procedure/appointment date if applicable: N/A

## 2022-04-04 PROBLEM — D64.9 ANEMIA: Status: ACTIVE | Noted: 2022-01-01

## 2022-04-04 PROBLEM — D72.829 LEUKOCYTOSIS: Status: ACTIVE | Noted: 2022-01-01

## 2022-04-04 PROBLEM — K31.5 DUODENAL STRICTURE: Status: ACTIVE | Noted: 2022-01-01

## 2022-04-04 NOTE — ASSESSMENT & PLAN NOTE
· No source of infection identified at this time  · Monitor off antibiotics but await blood cultures  · Suspect his leukocytosis is due to Neulasta administered after his last dose of chemotherapy

## 2022-04-04 NOTE — ED ATTENDING ATTESTATION
4/4/2022  IIsaura MD, saw and evaluated the patient  I have discussed the patient with the resident/non-physician practitioner and agree with the resident's/non-physician practitioner's findings, Plan of Care, and MDM as documented in the resident's/non-physician practitioner's note, except where noted  All available labs and Radiology studies were reviewed  I was present for key portions of any procedure(s) performed by the resident/non-physician practitioner and I was immediately available to provide assistance  At this point I agree with the current assessment done in the Emergency Department  I have conducted an independent evaluation of this patient a history and physical is as follows:    56-year-old man with metastatic pancreatic cancer presenting after outpatient procedure for admission for expedited a duodenal stent placement  Has been having significant difficulty with p o  Intake  Does have abdominal bloating and discomfort  On exam awake and alert no acute distress  Head atraumatic normocephalic  Neck is supple  Heart regular rate and rhythm, no murmurs rubs or gallops  Lungs clear to auscultation bilaterally  Abdomen is soft, mildly distended  There is mild diffuse tenderness with no rebound or guarding  Plan labs, gastroenterology consult, symptomatic care, admission      ED Course         Critical Care Time  Procedures

## 2022-04-04 NOTE — ASSESSMENT & PLAN NOTE
· S/p biliary stent placement  · Follows with Dr Estela Yu for chemotherapy   · receiving oxiplatin and gemcitambine with filgrastim

## 2022-04-04 NOTE — ASSESSMENT & PLAN NOTE
· Likely due to malignancy/chronic blood loss  · No source of bleeding was identified on last EGD but it was not an ideal study due to retained foods and blood clot    · Monitor hb while inpatient  · Transfuse PRN

## 2022-04-04 NOTE — SEPSIS NOTE
Sepsis Note   Lata Boyer 67 y o  male MRN: [id number]  Unit/Bed#: ED 18 Encounter: [id number]       qSOFA     [address] Name 04/04/22 1104                Altered mental status GCS < 15 --        Respiratory Rate > / =52 0        Systolic BP < / =567 0        Q Sofa Score 0                   Initial Sepsis Screening     Row Name 04/04/22 1324                Is the patient's history suggestive of a new or worsening infection? Yes (Proceed)  -CY        Suspected source of infection suspect infection, source unknown  -CY        Are two or more of the following signs & symptoms of infection both present and new to the patient? Yes (Proceed)  -CY        Indicate SIRS criteria Tachycardia > 90 bpm;WBC > 10% bands;Leukocytosis (WBC > [address] IJL)  -CY        If the answer is yes to both questions, suspicion of sepsis is present --        If severe sepsis is present AND tissue hypoperfusion perists in the hour after fluid resuscitation or lactate > 4, the patient meets criteria for SEPTIC SHOCK --        Are any of the following organ dysfunction criteria present within 6 hours of suspected infection and SIRS criteria that are NOT considered to be chronic conditions?  No  -CY        Organ dysfunction --        Date of presentation of severe sepsis --        Time of presentation of severe sepsis --        Tissue hypoperfusion persists in the hour after crystalloid fluid administration, evidenced, by either: --        Was hypotension present within one hour of the conclusion of crystalloid fluid administration? --        Date of presentation of septic shock --        Time of presentation of septic shock --              User Key  (r) = Recorded By, (t) = Taken By, (c) = Cosigned By    [address] Name Provider Type    CY Kateryna Gutierrez MD Resident

## 2022-04-04 NOTE — ED PROVIDER NOTES
History  Chief Complaint   Patient presents with    Abdominal Problem     Pt sent here by oncologist d/t need for stent placement in small intestine  Pt having difficulty eating     67 y o M w/ metastatic pancreatic cancer on gemcitabine/oxaliplatin since 02/04/2022 (3 total cycles to date), presenting from heme-onc office for expedited GI management w/ duodenal stent placement  He has been having increased weakness and fatigue 2/2 inability to tolerate PO intake  He tried eating a caloric shake this morning and reports it took an hour to drink a few ounces due to discomfort and sensation of fullness  He also has persistent nausea which has responded to zofran/reglan q6-8H  He otherwise has felt weaker than normal in the past few days  He denies HA, vomiting, CP/SOB, Abd pain, or other subjective symptoms  Prior to Admission Medications   Prescriptions Last Dose Informant Patient Reported? Taking? Elastic Bandages & Supports (Medical Compression Socks) MISC   No No   Sig: Use in the morning 20 - 30mmHG  If possible, please use compression socks with high pressure near foot/ankle (gradated)     aspirin (ECOTRIN LOW STRENGTH) 81 mg EC tablet  Self Yes No   Sig: Take 81 mg by mouth daily   bisacodyl (DULCOLAX) 10 mg suppository  Self No No   Sig: Insert 1 suppository (10 mg total) into the rectum daily as needed for constipation   bisacodyl (FLEET) 10 MG/30ML ENEM   No No   Sig: Insert 30 mL (10 mg total) into the rectum once for 1 dose If lactulose or suppository not helpful   cetirizine (ZyrTEC) 10 mg tablet  Self Yes No   Sig: Take 10 mg by mouth   lisinopril (ZESTRIL) 10 mg tablet  Self Yes No   Patient not taking: Reported on 2/25/2022    mirtazapine (REMERON) 7 5 MG tablet   No No   Sig: Take 1 tablet (7 5 mg total) by mouth daily at bedtime   ondansetron (ZOFRAN) 4 mg tablet  Self No No   Sig: Take 1 tablet (4 mg total) by mouth every 8 (eight) hours as needed for nausea or vomiting   oxyCODONE (ROXICODONE) 5 immediate release tablet  Self No No   Sig: Take 1 - 2 tablets PO Q6H PRN pain  pantoprazole (PROTONIX) 20 mg tablet  Self Yes No   polyethylene glycol (MIRALAX) 17 g packet  Self No No   Sig: Take 17 g by mouth daily   senna (SENOKOT) 8 6 mg  Self No No   Sig: Take 1 tablet (8 6 mg total) by mouth daily at bedtime as needed for constipation      Facility-Administered Medications: None       Past Medical History:   Diagnosis Date    CAD (coronary artery disease)     GERD (gastroesophageal reflux disease)     Hyperlipidemia     Hypertension     [facility] cancer ([facility] )     Prostate cancer ([facility] )        Past Surgical History:   Procedure Laterality Date    CHOLECYSTECTOMY      CORONARY ANGIOPLASTY WITH STENT PLACEMENT      IR PORT PLACEMENT  2/10/2022       Family History   Problem Relation Age of Onset    Pancreatic cancer Mother     Dementia Father     Esophageal cancer Sister      I have reviewed and agree with the history as documented  E-Cigarette/Vaping    E-Cigarette Use Never User      E-Cigarette/Vaping Substances    Nicotine No     THC No     CBD No     Flavoring No     Other No     Unknown No      Social History     Tobacco Use    Smoking status: Never Smoker    Smokeless tobacco: Never Used   Vaping Use    Vaping Use: Never used   Substance Use Topics    Alcohol use: Not Currently    Drug use: Never        Review of Systems   Constitutional: Positive for appetite change  Negative for chills and fever  HENT: Negative for ear pain and sore throat  Eyes: Negative for pain and visual disturbance  Respiratory: Negative for cough and shortness of breath  Cardiovascular: Negative for chest pain and palpitations  Gastrointestinal: Positive for nausea  Negative for abdominal pain and vomiting  Genitourinary: Negative for dysuria and hematuria  Musculoskeletal: Negative for arthralgias and back pain  Skin: Negative for color change and rash     Neurological: Negative for seizures and syncope  All other systems reviewed and are negative  Physical Exam  ED Triage Vitals   Temperature Pulse Respirations Blood Pressure SpO2   04/04/22 1108 04/04/22 1104 04/04/22 1104 04/04/22 1104 04/04/22 1104   97 8 °F (36 6 °C) (!) 106 16 131/80 98 %      Temp Source Heart Rate Source Patient Position - Orthostatic VS BP Location FiO2 (%)   04/04/22 1108 04/04/22 1104 04/04/22 1104 04/04/22 1104 --   Oral Monitor Lying Right arm       Pain Score       04/04/22 1104       3             Orthostatic Vital Signs  Vitals:    04/05/22 2216 04/06/22 0802 04/06/22 1453 04/06/22 1612   BP: 120/81 124/84 128/80 128/74   Pulse:   (!) 113 102   Patient Position - Orthostatic VS:           Physical Exam  Vitals and nursing note reviewed  Constitutional:       Appearance: He is well-developed  HENT:      Head: Normocephalic and atraumatic  Right Ear: External ear normal       Left Ear: External ear normal       Nose: Nose normal       Mouth/Throat:      Mouth: Mucous membranes are moist    Eyes:      Conjunctiva/sclera: Conjunctivae normal    Cardiovascular:      Rate and Rhythm: Normal rate and regular rhythm  Heart sounds: No murmur heard  Pulmonary:      Effort: Pulmonary effort is normal  No respiratory distress  Breath sounds: Normal breath sounds  Abdominal:      General: There is no distension  Palpations: Abdomen is soft  There is no mass  Tenderness: There is no abdominal tenderness  There is no guarding  Musculoskeletal:      Cervical back: Neck supple  Right lower leg: No edema  Left lower leg: No edema  Skin:     General: Skin is warm and dry  Coloration: Skin is pale  Neurological:      General: No focal deficit present  Mental Status: He is alert and oriented to person, place, and time     Psychiatric:         Mood and Affect: Mood normal          Behavior: Behavior normal          ED Medications  Medications mirtazapine (REMERON) tablet 7 5 mg (7 5 mg Oral Given 4/5/22 2122)   pantoprazole (PROTONIX) EC tablet 20 mg (20 mg Oral Given 4/6/22 0530)   acetaminophen (TYLENOL) tablet 650 mg (has no administration in time range)   multi-electrolyte (PLASMALYTE-A/ISOLYTE-S PH 7 4) IV solution (75 mL/hr Intravenous New Bag 4/6/22 0954)   ondansetron (ZOFRAN) injection 4 mg (4 mg Intravenous Given 4/6/22 1434)   enoxaparin (LOVENOX) subcutaneous injection 40 mg (40 mg Subcutaneous Given 4/6/22 0852)   oxyCODONE (ROXICODONE) IR tablet 5 mg (5 mg Oral Given 4/6/22 0530)   oxyCODONE (ROXICODONE) immediate release tablet 10 mg (has no administration in time range)   HYDROmorphone (DILAUDID) injection 0 5 mg (0 5 mg Intravenous Given 4/6/22 0224)   fluticasone (FLONASE) 50 mcg/act nasal spray 1 spray (1 spray Each Nare Given 4/6/22 0853)   loratadine (CLARITIN) tablet 10 mg (10 mg Oral Given 4/6/22 0852)   senna-docusate sodium (SENOKOT S) 8 6-50 mg per tablet 1 tablet (1 tablet Oral Refused 4/5/22 2122)   metoclopramide (REGLAN) injection 10 mg (10 mg Intravenous Given 4/6/22 0852)   iohexol (OMNIPAQUE) 240 MG/ML solution 50 mL (has no administration in time range)   ondansetron (ZOFRAN) injection 4 mg (4 mg Intravenous Given 4/4/22 1215)   metoclopramide (REGLAN) injection 10 mg (10 mg Intravenous Given 4/4/22 1215)   sodium chloride 0 9 % bolus 500 mL (0 mL Intravenous Stopped 4/4/22 1408)   HYDROmorphone (DILAUDID) injection 0 5 mg (0 5 mg Intravenous Given 4/4/22 1243)   cefepime (MAXIPIME) 2 g/50 mL dextrose IVPB (0 mg Intravenous Stopped 4/4/22 1458)       Diagnostic Studies  Results Reviewed     Procedure Component Value Units Date/Time    Blood culture #1 [[id number]] Collected: 04/04/22 1308    Lab Status: Preliminary result Specimen: Blood from Arm, Right Updated: 04/06/22 1601     Blood Culture No Growth at 48 hrs      Blood culture #2 [[id number]] Collected: 04/04/22 1339    Lab Status: Preliminary result Specimen: Blood from Arm, Left Updated: 04/06/22 1601     Blood Culture No Growth at 48 hrs      CBC (With Platelets) [[id number]]  (Abnormal) Collected: 04/05/22 1101    Lab Status: Final result Specimen: Blood from Arm, Left Updated: 04/05/22 1109     WBC 18 06 Thousand/uL      RBC 2 51 Million/uL      Hemoglobin 7 8 g/dL      Hematocrit 24 9 %      MCV 99 fL      MCH 31 1 pg      MCHC 31 3 g/dL      RDW 19 8 %      Platelets 459 Thousands/uL      MPV 11 1 fL     Basic metabolic panel [[id number]]  (Abnormal) Collected: 04/05/22 0526    Lab Status: Final result Specimen: Blood from Arm, Left Updated: 04/05/22 6846     Sodium 132 mmol/L      Potassium 4 3 mmol/L      Chloride 99 mmol/L      CO2 27 mmol/L      ANION GAP 6 mmol/L      BUN 12 mg/dL      Creatinine 0 75 mg/dL      Glucose 89 mg/dL      Calcium 8 6 mg/dL      eGFR 88 ml/min/1 73sq m     Narrative:      [facility] guidelines for Chronic Kidney Disease (CKD):     Stage 1 with normal or high GFR (GFR > 90 mL/min/1 73 square meters)    Stage 2 Mild CKD (GFR = 60-89 mL/min/1 73 square meters)    Stage 3A Moderate CKD (GFR = 45-59 mL/min/1 73 square meters)    Stage 3B Moderate CKD (GFR = 30-44 mL/min/1 73 square meters)    Stage 4 Severe CKD (GFR = 15-29 mL/min/1 73 square meters)    Stage 5 End Stage CKD (GFR <15 mL/min/1 73 square meters)  Note: GFR calculation is accurate only with a steady state creatinine    Magnesium [[id number]]  (Normal) Collected: 04/05/22 0526    Lab Status: Final result Specimen: Blood from Arm, Left Updated: 04/05/22 0638     Magnesium 1 8 mg/dL     Phosphorus [[id number]]  (Normal) Collected: 04/05/22 0526    Lab Status: Final result Specimen: Blood from Arm, Left Updated: 04/05/22 0638     Phosphorus 3 0 mg/dL     Folate [[id number]]  (Normal) Collected: 04/04/22 1207    Lab Status: Final result Specimen: Blood from Arm, Left Updated: 04/04/22 1733     Folate 8 3 ng/mL     Vitamin B12 [[id number]]  (Abnormal) Collected: 04/04/22 1207    Lab Status: Final result Specimen: Blood from Arm, Left Updated: 04/04/22 1733     Vitamin B-12 >6,000 pg/mL     Urine Microscopic [[id number]]  (Abnormal) Collected: 04/04/22 1309    Lab Status: Final result Specimen: Urine, Clean Catch Updated: 04/04/22 1414     RBC, UA 1-2 /hpf      WBC, UA 1-2 /hpf      Epithelial Cells Occasional /hpf      Bacteria, UA None Seen /hpf      MUCUS THREADS Occasional    UA w Reflex to Microscopic w Reflex to Culture [[id number]]  (Abnormal) Collected: 04/04/22 1309    Lab Status: Final result Specimen: Urine, Clean Catch Updated: 04/04/22 1412     Color, UA Yellow     Clarity, UA Clear     Specific Gravity, UA 1 019     pH, UA 7 0     Leukocytes, UA Negative     Nitrite, UA Negative     Protein, UA 30 (1+) mg/dl      Glucose, UA Negative mg/dl      Ketones, UA Trace mg/dl      Urobilinogen, UA 3 0 mg/dl      Bilirubin, UA Negative     Blood, UA Negative    Procalcitonin [[id number]]  (Abnormal) Collected: 04/04/22 1307    Lab Status: Final result Specimen: Blood from Arm, Right Updated: 04/04/22 1345     Procalcitonin 0 35 ng/ml     Lactic acid, plasma [[id number]]  (Normal) Collected: 04/04/22 1307    Lab Status: Final result Specimen: Blood from Arm, Right Updated: 04/04/22 1337     LACTIC ACID 1 8 mmol/L     Narrative:      Result may be elevated if tourniquet was used during collection      Protime-INR [[id number]]  (Abnormal) Collected: 04/04/22 1307    Lab Status: Final result Specimen: Blood from Arm, Right Updated: 04/04/22 1330     Protime 15 1 seconds      INR 1 24    APTT [[id number]]  (Normal) Collected: 04/04/22 1307    Lab Status: Final result Specimen: Blood from Arm, Right Updated: 04/04/22 1330     PTT 31 seconds     CBC and differential [[id number]]  (Abnormal) Collected: 04/04/22 1207    Lab Status: Final result Specimen: Blood from Arm, Left Updated: 04/04/22 1309     WBC 23 41 Thousand/uL      RBC 2 79 Million/uL      Hemoglobin 8 6 g/dL Hematocrit 27 2 %      MCV 98 fL      MCH 30 8 pg      MCHC 31 6 g/dL      RDW 19 6 %      MPV 11 9 fL      Platelets 402 Thousands/uL     Narrative: This is an appended report  These results have been appended to a previously verified report      Manual Differential(PHLEBS Do Not Order) [[id number]]  (Abnormal) Collected: 04/04/22 1207    Lab Status: Final result Specimen: Blood from Arm, Left Updated: 04/04/22 1309     Segmented % 90 %      Bands % 1 %      Lymphocytes % 4 %      Monocytes % 5 %      Eosinophils, % 0 %      Basophils % 0 %      Absolute Neutrophils 21 30 Thousand/uL      Lymphocytes Absolute 0 94 Thousand/uL      Monocytes Absolute 1 17 Thousand/uL      Eosinophils Absolute 0 00 Thousand/uL      Basophils Absolute 0 00 Thousand/uL      Total Counted --     RBC Morphology Present     Anisocytosis Present     Macrocytes Present     Microcytes Present     Polychromasia Present     Tear Drop Cells Present     Platelet Estimate Adequate     Artifact Present    Comprehensive metabolic panel [[id number]]  (Abnormal) Collected: 04/04/22 1207    Lab Status: Final result Specimen: Blood from Arm, Left Updated: 04/04/22 1245     Sodium 133 mmol/L      Potassium 3 6 mmol/L      Chloride 94 mmol/L      CO2 31 mmol/L      ANION GAP 8 mmol/L      BUN 15 mg/dL      Creatinine 1 01 mg/dL      Glucose 133 mg/dL      Calcium 9 7 mg/dL      Corrected Calcium 11 0 mg/dL      AST 66 U/L      ALT 37 U/L      Alkaline Phosphatase 238 U/L      Total Protein 7 1 g/dL      Albumin 2 4 g/dL      Total Bilirubin 0 77 mg/dL      eGFR 71 ml/min/1 73sq m     Narrative:      [facility] guidelines for Chronic Kidney Disease (CKD):     Stage 1 with normal or high GFR (GFR > 90 mL/min/1 73 square meters)    Stage 2 Mild CKD (GFR = 60-89 mL/min/1 73 square meters)    Stage 3A Moderate CKD (GFR = 45-59 mL/min/1 73 square meters)    Stage 3B Moderate CKD (GFR = 30-44 mL/min/1 73 square meters)    Stage 4 Severe CKD (GFR = 15-29 mL/min/1 73 square meters)    Stage 5 End Stage CKD (GFR <15 mL/min/1 73 square meters)  Note: GFR calculation is accurate only with a steady state creatinine    Lipase [[id number]]  (Abnormal) Collected: 04/04/22 1207    Lab Status: Final result Specimen: Blood from Arm, Left Updated: 04/04/22 1243     Lipase 18 u/L                  CT chest abdomen pelvis wo contrast   Final Result by Mague Borrero MD (04/04 1410)      Pancreatic mass most consistent with neoplasm  Worsening hepatic metastases  New omental metastases and small ascites  Pneumobilia with placement of the common bile duct stent  Stable left lower neck and right retrocrural lymphadenopathy  Slightly worsening retroperitoneal lymphadenopathy concerning for worsening metastases  Stable pulmonary nodules  One pulmonary nodule in the left lower lobe has slightly increased in size, indeterminate  No focal airspace consolidation  Workstation performed: [id number]         XR chest 1 view    (Results Pending)         Procedures  Procedures      ED Course               Identification of Seniors at Risk      Most Recent Value   (ISAR) Identification of Seniors at Risk    Before the illness or injury that brought you to the Emergency, did you need someone to help you on a regular basis? 0 Filed at: 04/04/2022 1106   In the last 24 hours, have you needed more help than usual? 0 Filed at: 04/04/2022 1106   Have you been hospitalized for one or more nights during the past 6 months? 1 Filed at: 04/04/2022 1106   In general, do you see well? 0 Filed at: 04/04/2022 1106   In general, do you have serious problems with your memory? 0 Filed at: 04/04/2022 1106   Do you take more than three different medications every day?  1 Filed at: 04/04/2022 1106   ISAR Score 2 Filed at: 04/04/2022 1106                 Initial Sepsis Screening     Row Name 04/04/22 2498                Is the patient's history suggestive of a new or worsening infection? Yes (Proceed)  -CY        Suspected source of infection suspect infection, source unknown  -CY        Are two or more of the following signs & symptoms of infection both present and new to the patient? Yes (Proceed)  -CY        Indicate SIRS criteria Tachycardia > 90 bpm;WBC > 10% bands;Leukocytosis (WBC > [address]695 IJL)  -CY        If the answer is yes to both questions, suspicion of sepsis is present --        If severe sepsis is present AND tissue hypoperfusion perists in the hour after fluid resuscitation or lactate > 4, the patient meets criteria for SEPTIC SHOCK --        Are any of the following organ dysfunction criteria present within 6 hours of suspected infection and SIRS criteria that are NOT considered to be chronic conditions? No  -CY        Organ dysfunction --        Date of presentation of severe sepsis --        Time of presentation of severe sepsis --        Tissue hypoperfusion persists in the hour after crystalloid fluid administration, evidenced, by either: --        Was hypotension present within one hour of the conclusion of crystalloid fluid administration? --        Date of presentation of septic shock --        Time of presentation of septic shock --              User Key  (r) = Recorded By, (t) = Taken By, (c) = Cosigned By    [address] Name Provider Type    CY Dee Bernal MD Resident                          MDM  Number of Diagnoses or Management Options  Pancreatic mass  Diagnosis management comments: 67 y o M w/ metastatic pancreatic cancer on chemotherapy  Will order basic labs and reach out to GI team for consult  WBC significantly elevated  Pt meeting SIRS criteria due to this finding  Will obtain sepsis workup and order CTAP to evaluate for alternative etiology  Given 1 dose IV Abx  Zofran/reglan for nausea and dilaudid for developing abdominal pain  No obvious etiology for possible sepsis   Will defer further evaluation to admitting team  Discussed case with internal medicine team who agree with admission for symptom management pending GI determination for possible palliative pyloric stent placement  Patient agreeable with plan and admitted  Disposition  Final diagnoses:   Pancreatic mass     Time reflects when diagnosis was documented in both MDM as applicable and the Disposition within this note     Time User Action Codes Description Comment    4/4/2022 12:24 PM Joby Polk Add [K86 89] Pancreatic mass     4/5/2022  7:30 AM Rolanda Maui Add [K31 5] Duodenal stricture       ED Disposition     ED Disposition Condition Date/Time Comment    Admit Stable Mon Apr 4, 2022  2:23 PM Case was discussed with Dr Clau Hinton and the patient's admission status was agreed to be Admission Status: inpatient status to the service of Dr Clau Hinton   Follow-up Information    None         Current Discharge Medication List      CONTINUE these medications which have NOT CHANGED    Details   aspirin (ECOTRIN LOW STRENGTH) 81 mg EC tablet Take 81 mg by mouth daily      bisacodyl (DULCOLAX) 10 mg suppository Insert 1 suppository (10 mg total) into the rectum daily as needed for constipation  Qty: 12 suppository, Refills: 0    Associated Diagnoses: Drug-induced constipation      bisacodyl (FLEET) 10 MG/30ML ENEM Insert 30 mL (10 mg total) into the rectum once for 1 dose If lactulose or suppository not helpful  Qty: 30 mL, Refills: 0    Associated Diagnoses: Drug-induced constipation      cetirizine (ZyrTEC) 10 mg tablet Take 10 mg by mouth      Elastic Bandages & Supports (Medical Compression Socks) MISC Use in the morning 20 - 30mmHG  If possible, please use compression socks with high pressure near foot/ankle (gradated)  Qty: 2 each, Refills: 0    Associated Diagnoses: Pancreatic mass; Palliative care patient; Prostate cancer ([facility] );  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 (HCC)      lisinopril (ZESTRIL) 10 mg tablet       mirtazapine (REMERON) 7 5 MG tablet Take 1 tablet (7 5 mg total) by mouth daily at bedtime  Qty: 30 tablet, Refills: 0    Associated Diagnoses: Palliative care patient; Insomnia      ondansetron (ZOFRAN) 4 mg tablet Take 1 tablet (4 mg total) by mouth every 8 (eight) hours as needed for nausea or vomiting  Qty: 90 tablet, Refills: 0    Associated Diagnoses: Palliative care patient; Pancreatic mass; Prostate cancer ([facility] ); Nausea      oxyCODONE (ROXICODONE) 5 immediate release tablet Take 1 - 2 tablets PO Q6H PRN pain  Qty: 120 tablet, Refills: 0    Comments: Palliative care patient  Fill on or near 2/22/22  Associated Diagnoses: Palliative care patient; Pancreatic mass; Prostate cancer ([facility] ); Cancer related pain      pantoprazole (PROTONIX) 20 mg tablet       polyethylene glycol (MIRALAX) 17 g packet Take 17 g by mouth daily  Qty: 10 each, Refills: 0    Associated Diagnoses: Malignant neoplasm of head of pancreas ([facility] ); Palliative care patient; Drug-induced constipation      senna (SENOKOT) 8 6 mg Take 1 tablet (8 6 mg total) by mouth daily at bedtime as needed for constipation  Qty: 30 tablet, Refills: 0    Associated Diagnoses: Palliative care patient; Pancreatic mass; Prostate cancer ([facility] ); Therapeutic opioid-induced constipation (OIC)           No discharge procedures on file  PDMP Review       Value Time User    PDMP Reviewed  Yes 3/24/2022  3:34 PM Mohit Celestin PA-C           ED Provider  Attending physically available and evaluated Anuradha Piña  I managed the patient along with the ED Attending      Electronically Signed by         Morelia Mathur MD  04/06/22 9824

## 2022-04-04 NOTE — ASSESSMENT & PLAN NOTE
· Noted on prior endoscopy but was not able to be stented at that time  · Place on liquid diet for now  · Continue PPI  · Consult GI for possible palliative stent this admission

## 2022-04-04 NOTE — CONSULTS
Consultation - [facility]  Jennifer Miller 67 y o  male MRN: [id number]  Unit/Bed#: ED 18 Encounter: [id number]    Reason for Consult / Principal Problem:     Jennifer Miller is a 67 y o  patient w/ hx of Metastatic Pancreatic Adenocarcinoma s/p ERCP w  biliary stent placement 1/22 on chemo, CKD III, GERD who presents from oncology clinic for GI evaluation  ASSESSMENT AND PLAN:      Pancreatic Head Cancer      Pancreatic head adenocarcinoma with mets to the liver diagnosed earlier this year s/p biliary stent placement     3/25- EGD The esophagus appeared normal  The stomach appeared normal  Malignant-appearing and inflamed stricture in the 1st part of the duodenum  Not traversed, area obscured by clot and fluid  3/31- Repeat EGD but unable to place stent at the stricture due to lack of equipment per patient  Scheduled for EGD w/ Duodenal stent on the 15th  Sent by Oncology Team due to persistent poor PO intake and nausea for GI evaluation    Will schedule for EGD / Stent Placement  Keep NPO midnight  Labs in the morning     Anemia      Hx of pancreatic cancer currently on gemcitabine/oxaliplatin  3 cycles completed, Scheduled for the 4th this friday  No hx of c-scope   Flex Sig 3/31-Small internal hemorrhoids, otherwise normal exam up to splenic flexure with no source of GI blood loss      Hb stable at 8 6, MCV 98, INR 1 24  + occult blood test  No melena, hematochezia, BRBPR, hemoptysis    Iron Panel: Sat 21%, TIBC 199 Iron 42    No sign of active bleed  Schedule for EGD  Transfuse <7  Monitor Hb       ______________________________________________________________________    HISTORY OF PRESENT ILLNESS:  Jennifer Miller is a 67 y o  patient w/ hx of Metastatic Pancreatic Head Adenocarcinoma s/p ERCP w  biliary stent placement on 1/22 currently on Chemo (gemcitabine/oxaliplatin since 02/04/2022 3 total cycles to date), CKD III, GERD who presents from oncology clinic for GI evaluation   Pt is scheduled for EGD / Flex Sig on 4/15 for evaluation for blood loss anemia and a duodenal stent placement for stricture  But in setting of clinic deterioration oncology team would like us to expedite the procedure  Endorses fatigue, nausea and poor PO intake due to early satiety  Denies any chest pain, SOB, diarrhea, fever or chills        REVIEW OF SYSTEMS:    CONSTITUTIONAL: Denies any fever, chills, rigors, and weight loss  HEENT: No earache or tinnitus, denies hearing loss or visual disturbances  CARDIOVASCULAR: No chest pain or palpitations   RESPIRATORY: Denies any cough, hemoptysis, shortness of breath or dyspnea on exertion  GASTROINTESTINAL: As noted in the History of Present Illness   GENITOURINARY: No problems with urination, denies any hematuria or dysuria  NEUROLOGIC: No dizziness or vertigo, denies headaches   MUSCULOSKELETAL: Denies any muscle or joint pain   SKIN: Denies skin rashes or itching   ENDOCRINE: Denies excessive thirst, denies intolerance to heat or cold  PSYCHOSOCIAL: Denies depression or anxiety, denies any recent memory loss     Historical Information   Past Medical History:   Diagnosis Date    CAD (coronary artery disease)     GERD (gastroesophageal reflux disease)     Hyperlipidemia     Hypertension     [facility] )     Prostate cancer ([facility] )      Past Surgical History:   Procedure Laterality Date    CHOLECYSTECTOMY      CORONARY ANGIOPLASTY WITH STENT PLACEMENT      IR PORT PLACEMENT  2/10/2022     Social History   Social History     Substance and Sexual Activity   Alcohol Use Not Currently     Social History     Substance and Sexual Activity   Drug Use Never     Social History     Tobacco Use   Smoking Status Never Smoker   Smokeless Tobacco Never Used     Family History   Problem Relation Age of Onset    Pancreatic cancer Mother     Dementia Father     Esophageal cancer Sister        Meds/Allergies   (Not in a hospital admission)    Current Facility-Administered Medications   Medication Dose Route Frequency    cefepime (MAXIPIME) 2 g/50 mL dextrose IVPB  2,000 mg Intravenous Once    sodium chloride 0 9 % bolus 500 mL  500 mL Intravenous Once    vancomycin (VANCOCIN) 1500 mg in sodium chloride 0 9% 250 mL IVPB  20 mg/kg Intravenous Once     No Known Allergies      PHYSICAL EXAM:      Objective   Blood pressure 131/80, pulse (!) 106, temperature 97 8 °F (36 6 °C), temperature source Oral, resp  rate 16, weight 74 4 kg (164 lb), SpO2 98 %  Body mass index is 24 94 kg/m²    No intake or output data in the 24 hours ending 04/04/22 1302    General Appearance:   Alert, cooperative, no distress   HEENT:   Normocephalic, atraumatic, anicteric     Neck:   Supple, symmetrical, trachea midline   Lungs:   Respirations unlabored    Heart:   Regular rate and rhythm   Abdomen:   Soft, non-tender, non-distended; normal bowel sounds; no masses, no organomegaly    Genitalia:   Deferred    Rectal:   Deferred    Extremities:   No cyanosis, clubbing or edema    Pulses:   2+ and symmetric all extremities    Skin:   No jaundice, rashes, or lesions    Lymph Nodes:   No palpable cervical lymphadenopathy        LAB RESULTS:     Admission on 04/04/2022   Component Date Value    WBC 04/04/2022 23 41*    RBC 04/04/2022 2 79*    Hemoglobin 04/04/2022 8 6*    Hematocrit 04/04/2022 27 2*    MCV 04/04/2022 98     MCH 04/04/2022 30 8     MCHC 04/04/2022 31 6     RDW 04/04/2022 19 6*    MPV 04/04/2022 11 9     Platelets 15/10/7307 144*    Sodium 04/04/2022 133*    Potassium 04/04/2022 3 6     Chloride 04/04/2022 94*    CO2 04/04/2022 31     ANION GAP 04/04/2022 8     BUN 04/04/2022 15     Creatinine 04/04/2022 1 01     Glucose 04/04/2022 133     Calcium 04/04/2022 9 7     Corrected Calcium 04/04/2022 11 0*    AST 04/04/2022 66*    ALT 04/04/2022 37     Alkaline Phosphatase 04/04/2022 238*    Total Protein 04/04/2022 7 1     Albumin 04/04/2022 2 4*    Total Bilirubin 04/04/2022 0 77     eGFR 04/04/2022 71     Lipase 04/04/2022 18*       RADIOLOGY RESULTS: I have personally reviewed pertinent imaging studies        Sadie Nichols MD  Internal Medicine Resident, PGY-2

## 2022-04-04 NOTE — H&P
[address] [facility]  H&P- Sol Mccord 1945, 67 y o  male MRN: [id number]  Unit/Bed#: ED 18 Encounter: [id number]  Primary Care Provider: River Bhakta DO   Date and time admitted to hospital: 4/4/2022 10:56 AM    * Duodenal stricture  Assessment & Plan  · Noted on prior endoscopy but was not able to be stented at that time  · Place on liquid diet for now  · Continue PPI  · Consult GI for possible palliative stent this admission  Leukocytosis  Assessment & Plan  · No source of infection identified at this time  · Monitor off antibiotics but await blood cultures  · Suspect his leukocytosis is due to Neulasta administered after his last dose of chemotherapy  Anemia  Assessment & Plan  · Likely due to malignancy/chronic blood loss  · No source of bleeding was identified on last EGD but it was not an ideal study due to retained foods and blood clot  · Monitor hb while inpatient  · Transfuse PRN    Malignant neoplasm of head of pancreas [facility])  Assessment & Plan  · S/p biliary stent placement  · Follows with Dr Majorie Goltz for chemotherapy   · receiving oxiplatin and gemcitambine with filgrastim    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 [facility])  Assessment & Plan  · S/p biliary stent on 1/20/22  · bilirubin has normalized        VTE Pharmacologic Prophylaxis:   Moderate Risk (Score 3-4) - Pharmacological DVT Prophylaxis Ordered: enoxaparin (Lovenox)  Code Status: Prior full  Discussion with family: Updated  (daughter) at bedside  Anticipated Length of Stay: Patient will be admitted on an inpatient basis with an anticipated length of stay of greater than 2 midnights secondary to duodenal stricture  Total Time for Visit, including Counseling / Coordination of Care: 45 minutes Greater than 50% of this total time spent on direct patient counseling and coordination of care  Chief Complaint: abdominal bloating, weight loss    History of Present Illness:   Sol Mccord is a 67 y o  male with a PMH of pancreatic cancer who presents with abdominal pain and bloating  The patient reports he has been developing worsening intolerance to solid foods is now liquid diet  He has abdominal bloating, denies blood in stools, or change or stool color  He has a known duodenal stricture which was unable to be stented recently  Review of Systems:  Review of Systems   Constitutional: Positive for fatigue  All other systems reviewed and are negative  Past Medical and Surgical History:   Past Medical History:   Diagnosis Date    CAD (coronary artery disease)     GERD (gastroesophageal reflux disease)     Hyperlipidemia     Hypertension     [facility]     Prostate cancer [facility])        Past Surgical History:   Procedure Laterality Date    CHOLECYSTECTOMY      CORONARY ANGIOPLASTY WITH STENT PLACEMENT      IR PORT PLACEMENT  2/10/2022       Meds/Allergies:  Prior to Admission medications    Medication Sig Start Date End Date Taking? Authorizing Provider   aspirin (ECOTRIN LOW STRENGTH) 81 mg EC tablet Take 81 mg by mouth daily    Historical Provider, MD   bisacodyl (DULCOLAX) 10 mg suppository Insert 1 suppository (10 mg total) into the rectum daily as needed for constipation 2/22/22   Stormy Joe PA-C   bisacodyl (FLEET) 10 MG/30ML ENEM Insert 30 mL (10 mg total) into the rectum once for 1 dose If lactulose or suppository not helpful 2/4/22 3/31/22  Andreina Ahn MD   cetirizine (ZyrTEC) 10 mg tablet Take 10 mg by mouth    Historical Provider, MD   Elastic Bandages & Supports (Medical Compression Socks) MISC Use in the morning 20 - 30mmHG  If possible, please use compression socks with high pressure near foot/ankle (gradated)   3/24/22   Ramy Leonardo PA-C   lisinopril (ZESTRIL) 10 mg tablet  10/1/21   Historical Provider, MD   mirtazapine (REMERON) 7 5 MG tablet Take 1 tablet (7 5 mg total) by mouth daily at bedtime 3/24/22   Stormy Joe RICHY   ondansetron (ZOFRAN) 4 mg tablet Take 1 tablet (4 mg total) by mouth every 8 (eight) hours as needed for nausea or vomiting 3/17/22   Paresh Contreras PA-C   oxyCODONE (ROXICODONE) 5 immediate release tablet Take 1 - 2 tablets PO Q6H PRN pain  2/22/22   Lauro Miramontes PA-C   pantoprazole (PROTONIX) 20 mg tablet  11/22/21   Historical Provider, MD   polyethylene glycol (MIRALAX) 17 g packet Take 17 g by mouth daily 2/22/22   Lauro Price PA-C   senna (SENOKOT) 8 6 mg Take 1 tablet (8 6 mg total) by mouth daily at bedtime as needed for constipation 2/22/22   Paresh Contreras PA-C     I have reviewed home medications with patient personally  Allergies: No Known Allergies    Social History:  Marital Status: /Civil Union   Occupation: retired  Patient Pre-hospital Living Situation: Home  Patient Pre-hospital Level of Mobility: walks  Patient Pre-hospital Diet Restrictions: liquid  Substance Use History:   Social History     Substance and Sexual Activity   Alcohol Use Not Currently     Social History     Tobacco Use   Smoking Status Never Smoker   Smokeless Tobacco Never Used     Social History     Substance and Sexual Activity   Drug Use Never       Family History:  Family History   Problem Relation Age of Onset    Pancreatic cancer Mother     Dementia Father     Esophageal cancer Sister        Physical Exam:     Vitals:   Blood Pressure: 131/72 (04/04/22 1342)  Pulse: 77 (04/04/22 1342)  Temperature: 97 8 °F (36 6 °C) (04/04/22 1108)  Temp Source: Oral (04/04/22 1108)  Respirations: 18 (04/04/22 1342)  Weight - Scale: 74 4 kg (164 lb) (04/04/22 1104)  SpO2: 93 % (04/04/22 1342)    Physical Exam  Constitutional:       Appearance: Normal appearance  HENT:      Head: Normocephalic and atraumatic  Mouth/Throat:      Mouth: Mucous membranes are moist       Pharynx: Oropharynx is clear  Eyes:      Extraocular Movements: Extraocular movements intact  Cardiovascular:      Rate and Rhythm: Normal rate and regular rhythm  Pulmonary:      Effort: Pulmonary effort is normal       Breath sounds: No wheezing or rales  Abdominal:      General: There is no distension  Palpations: Abdomen is soft  Tenderness: There is abdominal tenderness  Comments: Increased bowel sounds   Musculoskeletal:      Right lower leg: No edema  Left lower leg: No edema  Skin:     General: Skin is warm  Coloration: Skin is pale  Neurological:      General: No focal deficit present  Mental Status: He is alert and oriented to person, place, and time  Psychiatric:         Mood and Affect: Mood normal          Behavior: Behavior normal           Additional Data:     Lab Results:  Results from last 7 days   Lab Units 04/04/22  1207   WBC Thousand/uL 23 41*   HEMOGLOBIN g/dL 8 6*   HEMATOCRIT % 27 2*   PLATELETS Thousands/uL 144*   BANDS PCT % 1   LYMPHO PCT % 4*   MONO PCT % 5   EOS PCT % 0     Results from last 7 days   Lab Units 04/04/22  1207   SODIUM mmol/L 133*   POTASSIUM mmol/L 3 6   CHLORIDE mmol/L 94*   CO2 mmol/L 31   BUN mg/dL 15   CREATININE mg/dL 1 01   ANION GAP mmol/L 8   CALCIUM mg/dL 9 7   ALBUMIN g/dL 2 4*   TOTAL BILIRUBIN mg/dL 0 77   ALK PHOS U/L 238*   ALT U/L 37   AST U/L 66*   GLUCOSE RANDOM mg/dL 133     Results from last 7 days   Lab Units 04/04/22  1307   INR  1 24*             Results from last 7 days   Lab Units 04/04/22  1307   LACTIC ACID mmol/L 1 8   PROCALCITONIN ng/ml 0 35*       Imaging: Reviewed radiology reports from this admission including: abdominal/pelvic CT  CT chest abdomen pelvis wo contrast   Final Result by Cheng Lazaro MD (04/04 1410)      Pancreatic mass most consistent with neoplasm  Worsening hepatic metastases  New omental metastases and small ascites  Pneumobilia with placement of the common bile duct stent  Stable left lower neck and right retrocrural lymphadenopathy    Slightly worsening retroperitoneal lymphadenopathy concerning for worsening metastases  Stable pulmonary nodules  One pulmonary nodule in the left lower lobe has slightly increased in size, indeterminate  No focal airspace consolidation  Workstation performed: [id number]             EKG and Other Studies Reviewed on Admission:   · EKG: No EKG obtained  ** Please Note: This note has been constructed using a voice recognition system   **

## 2022-04-04 NOTE — TELEPHONE ENCOUNTER
----- Message from Ninoska Weinberg MD sent at 4/1/2022 10:48 AM EDT -----  Regarding: RE:  Hi team,    Can we add urgent procedure for Mr Jean Marie Mckeon, duodenal stent next week due to obstructing pancreatic mass  [facility]  Thanks! [employer]na Holding  ----- Message -----  From: Cathy Ceja DO  Sent: 4/1/2022   9:29 AM EDT  To: Miguel Covington MD, Ninoska Weinberg MD  Subject: RE:                                              Alban Perry and [place] Zoë  He has metastatic disease so all the treatments are palliative now  He can tolerate liquids but has been avoiding solids  Thanks for your help   ----- Message -----  From: Ninoska Weinberg MD  Sent: 4/1/2022   7:40 AM EDT  To: Cathy Ceja DO, Miguel Covington MD    Alpha Cull,    I believe Sina Camp is back Monday so I am cc'ing him to the message  We can do a duodenal stent probably sometime next week  Is he a surgical candidate? Duodenal stents cannot be removed as they are uncovered  If he has no other options we are more than happy to accommodate when we can squeeze him in       [facility]      ----- Message -----  From: Cathy Ceja DO  Sent: 3/31/2022   2:35 PM EDT  To: Ninoska Weinberg MD    Hi Brinada Rincon did an EUS/ERCP on this patient in January, but I see he is away this week  This patient has a pancreatic cancer and duodenal stricture that needed to be dilated to do the ERCP  He now has progressive anemia and can only consume liquids  I did an EGD today, I could not traverse the stricture due to retained fluid and clots    Is this someone that we can set up for a duodenal stent up in [place]?       Thanks - Virginia Francisco

## 2022-04-04 NOTE — TELEPHONE ENCOUNTER
Procedure scheduled with Jj mcdaniel to schedule in RM 4 at 7:30am     Scheduled date of EGD(as of today): 4/15/22  Physician performing EGD: Dr Mcclure  Location of EGD: [facility]  Instructions reviewed with patient by: Amy/sherman  Clearances: N/A

## 2022-04-04 NOTE — TELEPHONE ENCOUNTER
Pt seen in office today and not doing well eating or keeping food down  Per Verónica Leaks pt being sent to [facility] for inpatient eval for obstruction  Per Her orders to keep chemo on hold and cancel upcoming 4/8 appt   [place] infusion made aware

## 2022-04-05 PROBLEM — E43 SEVERE PROTEIN-CALORIE MALNUTRITION (HCC): Status: ACTIVE | Noted: 2022-01-01

## 2022-04-05 NOTE — ASSESSMENT & PLAN NOTE
68 y/o male with a history of newly diagnosed metastatic pancreatic adenocarcinoma w/ mets to liver, completed 3 cycles of chemo & currently on Gemcitabine & Oxaliplatin  Recent EGD 3/25 showed a stricture of 1st part of duodenum  Was scheduled for EGD 4/15 with duodenal stent placement for stricture, however now presents with intent to expedite procedure d/t worsening inability to tolerate p o  · GI consult - EGD with duodenal stent placement   · Progress diet as tolerated following procedure   · Zofran p r n

## 2022-04-05 NOTE — UTILIZATION REVIEW
Inpatient Admission Authorization Request   NOTIFICATION OF INPATIENT ADMISSION/INPATIENT AUTHORIZATION REQUEST   SERVICING FACILITY:   [facility]  Address: [address], [address] [facility] [address]  Tax ID: [id number]  NPI: [id number]  Place of Service: Inpatient [address] [facility] Code: 24     ATTENDING PROVIDER:  Attending Name and NPI#: Radha Mooney [[id number]]  Address: [address] [place] [address], [address] [place] [id number]  Phone: [phone]     UTILIZATION REVIEW CONTACT:  Tom Leon, Utilization   Network Utilization Review Department  Phone: [phone]  Fax: [phone]  Email: [email or web address]     PHYSICIAN ADVISORY SERVICES:  FOR [id number] REVIEW - MEDICAL NECESSITY DENIAL  Phone: [phone]  Fax: [phone]  Email: [email or web address]     TYPE OF REQUEST:  Inpatient Status     ADMISSION INFORMATION:  ADMISSION DATE/TIME: 4/4/22  2:24 PM  PATIENT DIAGNOSIS CODE/DESCRIPTION:  Pancreatic mass [K86 89]  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 ([facility] ) [K83 1, C80 1]  DISCHARGE DATE/TIME: No discharge date for patient encounter  IMPORTANT INFORMATION:  Please contact the Tom Leon directly with any questions or concerns regarding this request  Department voicemails are confidential     Send requests for admission clinical reviews, concurrent reviews, approvals, and administrative denials due to lack of clinical to fax [phone]

## 2022-04-05 NOTE — ASSESSMENT & PLAN NOTE
History of newly diagnosed metastatic pancreatic adenocarcinoma w/ mets to liver, completed 3 cycles of chemo (currently on Gemcitabine & Oxaliplatin)  S/p ERCP w/ biliary stent placement 1/20/22  Follows with Dr Melva Sever outpatient  · Continue outpatient follow-up with hem/onc  · PRN's:  · Tylenol 650 mg p o  Q6H   · Oxycodone 5mg/10 mg p o   Q4H   · Dilaudid 0 5 mg IV Q4H   · Zofran 4 mg IV Q6H   · Reglan 10 mg IV Q6H

## 2022-04-05 NOTE — PLAN OF CARE
Problem: PAIN - ADULT  Goal: Verbalizes/displays adequate comfort level or baseline comfort level  Description: Interventions:  - Encourage patient to monitor pain and request assistance  - Assess pain using appropriate pain scale  - Administer analgesics based on type and severity of pain and evaluate response  - Implement non-pharmacological measures as appropriate and evaluate response  - Consider cultural and social influences on pain and pain management  - Notify physician/advanced practitioner if interventions unsuccessful or patient reports new pain  Outcome: Progressing     Problem: GASTROINTESTINAL - ADULT  Goal: Minimal or absence of nausea and/or vomiting  Description: INTERVENTIONS:  - Administer IV fluids if ordered to ensure adequate hydration  - Maintain NPO status until nausea and vomiting are resolved  - Nasogastric tube if ordered  - Administer ordered antiemetic medications as needed  - Provide nonpharmacologic comfort measures as appropriate  - Advance diet as tolerated, if ordered  - Consider nutrition services referral to assist patient with adequate nutrition and appropriate food choices  Outcome: Progressing  Goal: Maintains or returns to baseline bowel function  Description: INTERVENTIONS:  - Assess bowel function  - Encourage oral fluids to ensure adequate hydration  - Administer IV fluids if ordered to ensure adequate hydration  - Administer ordered medications as needed  - Encourage mobilization and activity  - Consider nutritional services referral to assist patient with adequate nutrition and appropriate food choices  Outcome: Progressing  Goal: Maintains adequate nutritional intake  Description: INTERVENTIONS:  - Monitor percentage of each meal consumed  - Identify factors contributing to decreased intake, treat as appropriate  - Assist with meals as needed  - Monitor I&O, weight, and lab values if indicated  - Obtain nutrition services referral as needed  Outcome: Progressing Problem: GENITOURINARY - ADULT  Goal: Maintains or returns to baseline urinary function  Description: INTERVENTIONS:  - Assess urinary function  - Encourage oral fluids to ensure adequate hydration if ordered  - Administer IV fluids as ordered to ensure adequate hydration  - Administer ordered medications as needed  - Offer frequent toileting  - Follow urinary retention protocol if ordered  Outcome: Progressing  Goal: Absence of urinary retention  Description: INTERVENTIONS:  - Assess patients ability to void and empty bladder  - Monitor I/O  - Bladder scan as needed  - Discuss with physician/AP medications to alleviate retention as needed  - Discuss catheterization for long term situations as appropriate  Outcome: Progressing     Problem: METABOLIC, FLUID AND ELECTROLYTES - ADULT  Goal: Fluid balance maintained  Description: INTERVENTIONS:  - Monitor labs   - Monitor I/O and WT  - Instruct patient on fluid and nutrition as appropriate  - Assess for signs & symptoms of volume excess or deficit  Outcome: Progressing     Problem: Nutrition/Hydration-ADULT  Goal: Nutrient/Hydration intake appropriate for improving, restoring or maintaining nutritional needs  Description: Monitor and assess patient's nutrition/hydration status for malnutrition  Collaborate with interdisciplinary team and initiate plan and interventions as ordered  Monitor patient's weight and dietary intake as ordered or per policy  Utilize nutrition screening tool and intervene as necessary  Determine patient's food preferences and provide high-protein, high-caloric foods as appropriate       INTERVENTIONS:  - Monitor oral intake, urinary output, labs, and treatment plans  - Assess nutrition and hydration status and recommend course of action  - Evaluate amount of meals eaten  - Assist patient with eating if necessary   - Allow adequate time for meals  - Recommend/ encourage appropriate diets, oral nutritional supplements, and vitamin/mineral supplements  - Order, calculate, and assess calorie counts as needed  - Recommend, monitor, and adjust tube feedings and TPN/PPN based on assessed needs  - Assess need for intravenous fluids  - Provide specific nutrition/hydration education as appropriate  - Include patient/family/caregiver in decisions related to nutrition  Outcome: Progressing

## 2022-04-05 NOTE — PLAN OF CARE
Problem: PAIN - ADULT  Goal: Verbalizes/displays adequate comfort level or baseline comfort level  Description: Interventions:  - Encourage patient to monitor pain and request assistance  - Assess pain using appropriate pain scale  - Administer analgesics based on type and severity of pain and evaluate response  - Implement non-pharmacological measures as appropriate and evaluate response  - Consider cultural and social influences on pain and pain management  - Notify physician/advanced practitioner if interventions unsuccessful or patient reports new pain  Outcome: Progressing     Problem: GASTROINTESTINAL - ADULT  Goal: Minimal or absence of nausea and/or vomiting  Description: INTERVENTIONS:  - Administer IV fluids if ordered to ensure adequate hydration  - Maintain NPO status until nausea and vomiting are resolved  - Nasogastric tube if ordered  - Administer ordered antiemetic medications as needed  - Provide nonpharmacologic comfort measures as appropriate  - Advance diet as tolerated, if ordered  - Consider nutrition services referral to assist patient with adequate nutrition and appropriate food choices  Outcome: Progressing  Goal: Maintains or returns to baseline bowel function  Description: INTERVENTIONS:  - Assess bowel function  - Encourage oral fluids to ensure adequate hydration  - Administer IV fluids if ordered to ensure adequate hydration  - Administer ordered medications as needed  - Encourage mobilization and activity  - Consider nutritional services referral to assist patient with adequate nutrition and appropriate food choices  Outcome: Progressing  Goal: Maintains adequate nutritional intake  Description: INTERVENTIONS:  - Monitor percentage of each meal consumed  - Identify factors contributing to decreased intake, treat as appropriate  - Assist with meals as needed  - Monitor I&O, weight, and lab values if indicated  - Obtain nutrition services referral as needed  Outcome: Progressing Problem: GENITOURINARY - ADULT  Goal: Maintains or returns to baseline urinary function  Description: INTERVENTIONS:  - Assess urinary function  - Encourage oral fluids to ensure adequate hydration if ordered  - Administer IV fluids as ordered to ensure adequate hydration  - Administer ordered medications as needed  - Offer frequent toileting  - Follow urinary retention protocol if ordered  Outcome: Progressing  Goal: Absence of urinary retention  Description: INTERVENTIONS:  - Assess patients ability to void and empty bladder  - Monitor I/O  - Bladder scan as needed  - Discuss with physician/AP medications to alleviate retention as needed  - Discuss catheterization for long term situations as appropriate  Outcome: Progressing     Problem: METABOLIC, FLUID AND ELECTROLYTES - ADULT  Goal: Fluid balance maintained  Description: INTERVENTIONS:  - Monitor labs   - Monitor I/O and WT  - Instruct patient on fluid and nutrition as appropriate  - Assess for signs & symptoms of volume excess or deficit  Outcome: Progressing

## 2022-04-05 NOTE — ASSESSMENT & PLAN NOTE
Likely secondary to malignancy/chronic blood loss  No source of bleeding was identified on last EGD, however it was not an ideal study due to retained foods and blood clot  Currently no signs of active bleeding       · Scheduled for EGD  · Monitor hemoglobin   · Transfuse for hemoglobin < 7

## 2022-04-05 NOTE — PROGRESS NOTES
[address] [facility]  Progress Note Bernadine Menard 1945, 67 y o  male MRN: [id number]  Unit/Bed#: [facility] 815-01 Encounter: [id number]  Primary Care Provider: Oralia Fox DO   Date and time admitted to hospital: 4/4/2022 10:56 AM    * Duodenal stricture  Assessment & Plan  66 y/o male with a history of newly diagnosed metastatic pancreatic adenocarcinoma w/ mets to liver, completed 3 cycles of chemo & currently on Gemcitabine & Oxaliplatin  Recent EGD 3/25 showed a stricture of 1st part of duodenum  Was scheduled for EGD 4/15 with duodenal stent placement for stricture, however now presents with intent to expedite procedure d/t worsening inability to tolerate p o  · GI consult - EGD with duodenal stent placement Tuesday 4/5  · Progress diet as tolerated following procedure   · Zofran p r n  Malignant neoplasm of head of pancreas [facility])  Assessment & Plan  History of newly diagnosed metastatic pancreatic adenocarcinoma w/ mets to liver, completed 3 cycles of chemo (currently on Gemcitabine & Oxaliplatin)  S/p ERCP w/ biliary stent placement 1/20/22  Follows with Dr Rober Clemons outpatient  · Continue outpatient follow-up with hem/onc  · PRN's:  · Tylenol 650 mg p o  Q6H   · Dilaudid 0 5 mg IV Q4H   · Zofran 4 mg IV Q6H (4/4 x1, 4/5 x2)  · Oxycodone 10 mg p o  Q4H   · Oxycodone 5 mg p o  Q4H (4/4 x2, 4/5 x1)    Leukocytosis  Assessment & Plan  Leukocytosis on admission with WBC 23  No clear source of infection  UA negative  Suspect secondary to Neulasta administerd w/ last dose of chemo      · Monitor off antibiotics   · Follow-up blood cultures  · Trend WBC and temperature     Anemia  Assessment & Plan  Likely secondary to malignancy/chronic blood loss  No source of bleeding was identified on last EGD, however it was not an ideal study due to retained foods and blood clot  Currently no signs of active bleeding       · Scheduled for EGD 4/5   · Monitor hemoglobin   · Transfuse for hemoglobin < 7    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 [facility])  Assessment & Plan  S/p ERCP with biliary stent placement 22  Bilirubin now normalized  · Continue to monitor   · Management of pancreatic cancer as above     GERD (gastroesophageal reflux disease)  Assessment & Plan  Stable  · Continue protonix 20 mg daily      VTE Pharmacologic Prophylaxis:   Pharmacologic: Enoxaparin (Lovenox)  Mechanical VTE Prophylaxis in Place: Yes    Patient Centered Rounds: I have performed bedside rounds with nursing staff today  Time Spent for Care: 20 minutes  More than 50% of total time spent on counseling and coordination of care as described above  Current Length of Stay: 1 day(s)    Current Patient Status: Inpatient   Certification Statement: The patient will continue to require additional inpatient hospital stay due to EGD w/ duodenal stent placement    Discharge Plan / Estimated Discharge Date: < 48 hours       Code Status: Level 1 - Full Code      Subjective:   Patient reports inability to tolerate PO over the past 2 weeks, worsening over the few days prior to admission  Still experiencing some nausea, inability to tolerate PO this morning  Denies vomiting, significant abdominal discomfort, changes in bowel movements, or other complaints this morning  Objective:     Vitals:   Temp (24hrs), Av 9 °F (36 6 °C), Min:97 9 °F (36 6 °C), Max:97 9 °F (36 6 °C)    Temp:  [97 9 °F (36 6 °C)] 97 9 °F (36 6 °C)  HR:  [95-98] 95  Resp:  [18] 18  BP: (104-111)/(62-67) 111/65  SpO2:  [95 %-98 %] 95 %  Body mass index is 24 94 kg/m²  Input and Output Summary (last 24 hours): Intake/Output Summary (Last 24 hours) at 2022 1349  Last data filed at 2022 0901  Gross per 24 hour   Intake 1576 25 ml   Output 325 ml   Net 1251 25 ml       Physical Exam:   General: Right chest port  Awake, alert, and conversant  No acute distress  Eyes: Normal conjunctiva, anicteric  Round symmetric pupils     ENT: Hearing grossly intact  No nasal discharge  Neck: Neck is supple  No masses or thyromegaly  Respiratory: CTAB  Respirations are non-labored  No wheezing  Cardiovascular: RRR  No lower extremity edema  Abdomen: Mild epigastric abdominal tenderness  Bowel sounds present  No guarding or rigidity  Skin: Warm  No rashes or ulcers  Neuro: A&O x 3  Sensation and CN II-XII grossly normal    Psych: Cooperative  Appropriate mood and affect  Normal judgement  Additional Data:     Labs:    Results from last 7 days   Lab Units 04/05/22  1101 04/04/22  1207 04/04/22  1207   WBC Thousand/uL 18 06*   < > 23 41*   HEMOGLOBIN g/dL 7 8*   < > 8 6*   HEMATOCRIT % 24 9*   < > 27 2*   PLATELETS Thousands/uL 162   < > 144*   LYMPHO PCT %  --   --  4*   MONO PCT %  --   --  5   EOS PCT %  --   --  0    < > = values in this interval not displayed  Results from last 7 days   Lab Units 04/05/22  0526 04/04/22  1207 04/04/22  1207   POTASSIUM mmol/L 4 3   < > 3 6   CHLORIDE mmol/L 99*   < > 94*   CO2 mmol/L 27   < > 31   BUN mg/dL 12   < > 15   CREATININE mg/dL 0 75   < > 1 01   CALCIUM mg/dL 8 6   < > 9 7   ALK PHOS U/L  --   --  238*   ALT U/L  --   --  37   AST U/L  --   --  66*    < > = values in this interval not displayed  Results from last 7 days   Lab Units 04/04/22  1307   INR  1 24*       * I Have Reviewed All Lab Data Listed Above  * Additional Pertinent Lab Tests Reviewed: [facility] Admission Reviewed    Imaging: Reviewed     Recent Cultures (last 7 days):     Results from last 7 days   Lab Units 04/04/22  1339 04/04/22  1308   BLOOD CULTURE  Received in Microbiology Lab  Culture in Progress  Received in Microbiology Lab  Culture in Progress         Last 24 Hours Medication List:   Current Facility-Administered Medications   Medication Dose Route Frequency Provider Last Rate    acetaminophen  650 mg Oral Q6H PRN Kaye Kang MD      enoxaparin  40 mg Subcutaneous Daily Kaye Kang MD  fluticasone  1 spray Each Nare Daily Moses Silva DO      HYDROmorphone  0 5 mg Intravenous Q4H PRN Chelsea Rizo MD      mirtazapine  7 5 mg Oral HS Chelsea Rizo MD      multi-electrolyte  75 mL/hr Intravenous Continuous Chelsea Rizo MD 75 mL/hr (04/05/22 0521)    ondansetron  4 mg Intravenous Q6H PRN Chelsea Rizo MD      oxyCODONE  10 mg Oral Q4H PRN Chelsea Rizo MD      oxyCODONE  5 mg Oral Q4H PRN Chelsea Rizo MD      pantoprazole  20 mg Oral Early Morning Chelsea Rizo MD          Today, Patient Was Seen By: Dakota Jauregui DO    ** Please Note: Dragon 360 Dictation voice to text software may have been used in the creation of this document   **

## 2022-04-05 NOTE — MALNUTRITION/BMI
This medical record reflects one or more clinical indicators suggestive of malnutrition     Malnutrition Findings:   Adult Malnutrition type: Chronic illness  Adult Degree of Malnutrition: Other severe protein calorie malnutrition  Malnutrition Characteristics: Inadequate energy,Weight loss          360 Statement: Related to metastatic pancreatic cancer as evidenced by 20% weight loss over the past 4 months and <75% energy intake needs met >1 month treated with pending diet advancement        See Nutrition note dated 4/5/22 for additional details  Completed nutrition assessment is viewable in the nutrition documentation

## 2022-04-05 NOTE — UTILIZATION REVIEW
Initial Clinical Review    Admission: Date/Time/Statement:   Admission Orders (From admission, onward)     Ordered        04/04/22 1424  Inpatient Admission  Once                      Orders Placed This Encounter   Procedures    Inpatient Admission     Standing Status:   Standing     Number of Occurrences:   1     Order Specific Question:   Level of Care     Answer:   Med Surg [16]     Order Specific Question:   Estimated length of stay     Answer:   More than 2 Midnights     Order Specific Question:   Certification     Answer:   I certify that inpatient services are medically necessary for this patient for a duration of greater than two midnights  See H&P and MD Progress Notes for additional information about the patient's course of treatment  ED Arrival Information     Expected Arrival Acuity    4/4/2022 4/4/2022 10:42 Urgent         Means of arrival Escorted by Service Admission type    Walk-In Self Hospitalist Urgent         Arrival complaint    groin pain        Chief Complaint   Patient presents with    Abdominal Problem     Pt sent here by oncologist d/t need for stent placement in small intestine  Pt having difficulty eating       Initial Presentation: 67 y o  male  with a PMH of pancreatic cancer who presents to ED as a walk-in with abdominal pain and bloating  The pt reports he has been developing worsening intolerance to solid foods and is now on liquid diet  He has abdominal bloating, denies blood in stools, or change or stool color  He has a known duodenal stricture which was unable to be stented recently  Directed to ED by his physician for further tx  ADMIT INPATIENT to M/S UNIT with DUODENAL STRICTURE -- Plan: liquid diet for now  Start PPI  GI consult  WBC 23 41, no source of infection identified at this time  Monitor off abx  Blood cxs pending  H/H 8 27 2 likely d/t malignancy/chornic blood loss  Continue to monitor Hb, transfuse prn  Hem  Consult      GI consult 4/4 -- A: malignant neoplasm of head of the pancreas, biliary stricture s/p ERCP with metal stent placement on January 2022 now with n/v and duodenal stricture  He had stricture noted on recent EGD  Gastroscope was not able to be advanced into 2nd portion of the duodenum  He is not tolerating p o  Intake  Plan EGD with duodenal stent placement  Keep npo except ice chips, IVF's  Date: 4/5   Day 2:   Pt continues to have abdominal pain, nausea and intolerance to oral intake  Pt is npo pending pending endoscopy and duodenal stent placement by GI, continue gentle fluid hydration pending procedure  WBC's trending down off abx, suspect 2/2 Neulasta administration  Continue protonix daily, prn Zofran  Continue tylenol po Q6h, dilaudid IV prn  Oxy prn         ED Triage Vitals   Temperature Pulse Respirations Blood Pressure SpO2   04/04/22 1108 04/04/22 1104 04/04/22 1104 04/04/22 1104 04/04/22 1104   97 8 °F (36 6 °C) (!) 106 16 131/80 98 %      Temp Source Heart Rate Source Patient Position - Orthostatic VS BP Location FiO2 (%)   04/04/22 1108 04/04/22 1104 04/04/22 1104 04/04/22 1104 --   Oral Monitor Lying Right arm       Pain Score       04/04/22 1104       3          Wt Readings from Last 1 Encounters:   04/04/22 74 4 kg (164 lb)     Additional Vital Signs:   Date/Time Temp Pulse Resp BP MAP (mmHg) SpO2 O2 Device   04/05/22 06:27:41 97 9 °F (36 6 °C) 95 18 111/65 80 95 % --   04/04/22 22:02:08 97 9 °F (36 6 °C) -- 18 111/67 82 -- --   04/04/22 2200 -- -- -- -- -- -- None (Room air)   04/04/22 1800 -- 98 18 104/62 77 98 % None (Room air)   04/04/22 1342 -- 77 18 131/72 -- 93 % None (Room air)   04/04/22 1108 97 8 °F (36 6 °C) -- -- -- -- -- --   04/04/22 1104 -- 106 Abnormal  16 131/80 -- 98 % None (Room air)       Pertinent Labs/Diagnostic Test Results:   CT chest abdomen pelvis wo contrast   Final Result by Priscilla Garcia MD (04/04 1410)      Pancreatic mass most consistent with neoplasm  Worsening hepatic metastases        New omental metastases and small ascites  Pneumobilia with placement of the common bile duct stent  Stable left lower neck and right retrocrural lymphadenopathy  Slightly worsening retroperitoneal lymphadenopathy concerning for worsening metastases  Stable pulmonary nodules  One pulmonary nodule in the left lower lobe has slightly increased in size, indeterminate  No focal airspace consolidation              Results from last 7 days   Lab Units 04/05/22  1101 04/04/22  1207   WBC Thousand/uL 18 06* 23 41*   HEMOGLOBIN g/dL 7 8* 8 6*   HEMATOCRIT % 24 9* 27 2*   PLATELETS Thousands/uL 162 144*   BANDS PCT %  --  1       Results from last 7 days   Lab Units 04/05/22  0526 04/04/22  1207   SODIUM mmol/L 132* 133*   POTASSIUM mmol/L 4 3 3 6   CHLORIDE mmol/L 99* 94*   CO2 mmol/L 27 31   ANION GAP mmol/L 6 8   BUN mg/dL 12 15   CREATININE mg/dL 0 75 1 01   EGFR ml/min/1 73sq m 88 71   CALCIUM mg/dL 8 6 9 7   MAGNESIUM mg/dL 1 8  --    PHOSPHORUS mg/dL 3 0  --      Results from last 7 days   Lab Units 04/04/22  1207   AST U/L 66*   ALT U/L 37   ALK PHOS U/L 238*   TOTAL PROTEIN g/dL 7 1   ALBUMIN g/dL 2 4*   TOTAL BILIRUBIN mg/dL 0 77     Results from last 7 days   Lab Units 04/05/22  0526 04/04/22  1207   GLUCOSE RANDOM mg/dL 89 133     Results from last 7 days   Lab Units 04/04/22  1307   PROTIME seconds 15 1*   INR  1 24*   PTT seconds 31     Results from last 7 days   Lab Units 04/04/22  1307   PROCALCITONIN ng/ml 0 35*     Results from last 7 days   Lab Units 04/04/22  1307   LACTIC ACID mmol/L 1 8     Results from last 7 days   Lab Units 04/04/22  1207   LIPASE u/L 18*     Results from last 7 days   Lab Units 04/04/22  1309   CLARITY UA  Clear   COLOR UA  Yellow   SPEC GRAV UA  1 019   PH UA  7 0   GLUCOSE UA mg/dl Negative   KETONES UA mg/dl Trace*   BLOOD UA  Negative   PROTEIN UA mg/dl 30 (1+)*   NITRITE UA  Negative   BILIRUBIN UA  Negative   UROBILINOGEN UA (BE) mg/dl 3 0*   LEUKOCYTES UA  Negative WBC UA /hpf 1-2   RBC UA /hpf 1-2   BACTERIA UA /hpf None Seen   EPITHELIAL CELLS WET PREP /hpf Occasional   MUCUS THREADS  Occasional*     Results from last 7 days   Lab Units 04/04/22  1339 04/04/22  1308   BLOOD CULTURE  Received in Microbiology Lab  Culture in Progress  Received in Microbiology Lab  Culture in Progress           ED Treatment:   Medication Administration from 04/04/2022 0959 to 04/04/2022 2156       Date/Time Order Dose Route Action     04/04/2022 1215 ondansetron (ZOFRAN) injection 4 mg 4 mg Intravenous Given     04/04/2022 1215 metoclopramide (REGLAN) injection 10 mg 10 mg Intravenous Given     04/04/2022 1207 sodium chloride 0 9 % bolus 500 mL 500 mL Intravenous New Bag     04/04/2022 1243 HYDROmorphone (DILAUDID) injection 0 5 mg 0 5 mg Intravenous Given     04/04/2022 1409 cefepime (MAXIPIME) 2 g/50 mL dextrose IVPB 2,000 mg Intravenous New Bag     04/04/2022 1540 multi-electrolyte (PLASMALYTE-A/ISOLYTE-S PH 7 4) IV solution 75 mL/hr Intravenous New Bag     04/04/2022 1748 ondansetron (ZOFRAN) injection 4 mg 4 mg Intravenous Given     04/04/2022 1546 enoxaparin (LOVENOX) subcutaneous injection 40 mg 40 mg Subcutaneous Given     04/04/2022 1747 oxyCODONE (ROXICODONE) IR tablet 5 mg 5 mg Oral Given     Past Medical History:   Diagnosis Date    CAD (coronary artery disease)     GERD (gastroesophageal reflux disease)     Hyperlipidemia     Hypertension     Pancreatic cancer ([facility] )     Prostate cancer ([facility] )      Present on Admission:   [facility]   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 [facility])      Admitting Diagnosis: Pancreatic mass [K86 89]  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 ([facility] ) [K83 1, C80 1]  Age/Sex: 67 y o  male  Admission Orders:  Scheduled Medications:  enoxaparin, 40 mg, Subcutaneous, Daily  fluticasone, 1 spray, Each Nare, Daily  mirtazapine, 7 5 mg, Oral, HS  pantoprazole, 20 mg, Oral, Early Morning    Continuous IV Infusions:  multi-electrolyte, 75 mL/hr, Intravenous, Continuous    PRN Meds:  acetaminophen, 650 mg, Oral, Q6H PRN  HYDROmorphone, 0 5 mg, Intravenous, Q4H PRN  ondansetron, 4 mg, Intravenous, Q6H PRN 4/4 x1, 4/5 x2  oxyCODONE, 10 mg, Oral, Q4H PRN  oxyCODONE, 5 mg, Oral, Q4H PRN 4/4 x2, 4/5 x1        Network Utilization Review Department  ATTENTION: Please call with any questions or concerns to [phone] and carefully listen to the prompts so that you are directed to the right person  All voicemails are confidential   [place] Glass all requests for admission clinical reviews, approved or denied determinations and any other requests to dedicated fax number below belonging to the campus where the patient is receiving treatment   List of dedicated fax numbers for the Facilities:  [address] DENIALS (Administrative/Medical Necessity) [phone]   [address] (Maternity/NICU/Pediatrics) [phone]   [address] [place] [address]  [address] [facility] [place] [address] [address] [place] [address] [address]arl[address] [facility] [place] [address] [phone]

## 2022-04-05 NOTE — NUTRITION
04/05/22 1419   Recommendations/Interventions   Interventions/Recommendations Other (Specify)  (When medically indicated suggest advance diet to clear liquid once tolerated increase to full liquid with magic cup supplement TID )

## 2022-04-05 NOTE — ASSESSMENT & PLAN NOTE
Likely secondary to malignancy/chronic blood loss  No source of bleeding was identified on last EGD, however it was not an ideal study due to retained foods and blood clot  Currently no signs of active bleeding       · Scheduled for EGD 4/5   · Monitor hemoglobin   · Transfuse for hemoglobin < 7

## 2022-04-05 NOTE — ASSESSMENT & PLAN NOTE
History of newly diagnosed metastatic pancreatic adenocarcinoma w/ mets to liver, completed 3 cycles of chemo (currently on Gemcitabine & Oxaliplatin)  S/p ERCP w/ biliary stent placement 1/20/22  Follows with Dr Daniella Enrique outpatient        · Continue outpatient follow-up with hem/onc

## 2022-04-05 NOTE — ASSESSMENT & PLAN NOTE
66 y/o male with a history of newly diagnosed metastatic pancreatic adenocarcinoma w/ mets to liver, completed 3 cycles of chemo & currently on Gemcitabine & Oxaliplatin  Recent EGD 3/25 showed a stricture of 1st part of duodenum  Was scheduled for EGD 4/15 with duodenal stent placement for stricture, however now presents with intent to expedite procedure d/t worsening inability to tolerate p o        · GI consult - EGD with duodenal stent placement Tuesday 4/5  · Progress diet as tolerated following procedure

## 2022-04-05 NOTE — ASSESSMENT & PLAN NOTE
S/p ERCP with biliary stent placement 1/20/22  Bilirubin now normalized       · Continue to monitor   · Management of pancreatic cancer as above

## 2022-04-05 NOTE — ASSESSMENT & PLAN NOTE
Leukocytosis on admission with WBC 23  No clear source of infection  UA negative    Suspect secondary to Neulasta administerd w/ last dose of chemo      · Monitor off antibiotics   · Follow-up blood cultures  · Trend WBC and temperature

## 2022-04-06 NOTE — ANESTHESIA POSTPROCEDURE EVALUATION
Post-Op Assessment Note    CV Status:  Stable  Pain Score: 0    Pain management: adequate     Mental Status:  Alert and awake   Hydration Status:  Euvolemic   PONV Controlled:  Controlled   Airway Patency:  Patent      Post Op Vitals Reviewed: Yes      Staff: CRNA         No complications documented      /74 (04/06/22 1612)    Temp 99 1 °F (37 3 °C) (04/06/22 1612)    Pulse 102 (04/06/22 1612)   Resp 20 (04/06/22 1612)    SpO2 99 % (04/06/22 1612)

## 2022-04-06 NOTE — CASE MANAGEMENT
Case Management Assessment & Discharge Planning Note    Patient name Ahmet Mallory  Location [facility] 815/[facility] 040-56 MRN [id number]  : 1945 Date 2022       Current Admission Date: 2022  Current Admission Diagnosis:Duodenal stricture   Patient Active Problem List    Diagnosis Date Noted    Severe protein-calorie malnutrition ([facility] ) 2022    Anemia 2022    Duodenal stricture 2022    Leukocytosis 2022    Dehydration 2022    Malignant ne[facility] of head of pancreas ([facility] ) 2022    Chemotherapy induced neutropenia ([facility] ) 2022    Palliative care patient 2022    Blood loss anemia 2022    Stage 3 chronic kidney disease ([facility] ) 2022    Ischemic cardiomyopathy 2022    History of PTCA 2022    GERD (gastroesophageal reflux disease)     Pancreatic mass 2022    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 ([facility] ) 2022    Pure hypercholesterolemia     Primary hypertension     Pain of upper abdomen 2021    Change in bowel habit 2021    Prostate cancer ([facility] ) 2021    BRCA gene mutation positive in male 2021    Coronary artery disease involving native coronary artery of native heart without angina pectoris 2021      LOS (days): 2  Geometric Mean LOS (GMLOS) (days):   Days to GMLOS:     OBJECTIVE:    Risk of Unplanned Readmission Score: 21         Current admission status: Inpatient       Preferred Pharmacy:   [facility]/pharmacy [address][id number] [id number]  Phone: [phone] Fax: [phone]    Primary Care Provider: Ismael Morales DO    Primary Insurance: BLUE CROSS  Secondary Insurance:     ASSESSMENT:  Ignacio Robbins Proxies    There are no active Health Care Proxies on file         Advance Directives  Does patient have a Health Care POA?: Yes  Does patient have Advance Directives?: Yes  Primary Contact: briana Sánchez paperwork              Patient Information  Admitted from[de-identified] Home  Mental Status: Alert  During Assessment patient was accompanied by: Not accompanied during assessment  Assessment information provided by[de-identified] Patient  Primary Caregiver: Self  Support Systems: Spouse/significant other,Children  Home entry access options   Select all that apply : Stairs  Number of steps to enter home : 2  Do the steps have railings?: Yes  Type of Current Residence: [place]  In the last 12 months, was there a time when you were not able to pay the mortgage or rent on time?: No  In the last 12 months, was there a time when you did not have a steady place to sleep or slept in a shelter (including now)?: No  Homeless/housing insecurity resource given?: N/A  Living Arrangements: Lives w/ Spouse/significant other  Is patient a ?: No    Activities of Daily Living Prior to Admission  Functional Status: Independent  Completes ADLs independently?: Yes  Ambulates independently?: Yes  Does patient use assisted devices?: No  Does patient currently own DME?: No  Does patient have a history of Outpatient Therapy (PT/OT)?: No  Does the patient have a history of Short-Term Rehab?: No  Does patient have a history of HHC?: No         Patient Information Continued  Income Source: Pension/longterm  Within the past 12 months, you worried that your food would run out before you got the money to buy more : Never true  Within the past 12 months, the food you bought just didnt last and you didnt have money to get more : Never true  Food insecurity resource given?: N/A  Does patient receive dialysis treatments?: No  Does patient have a history of substance abuse?: No  Does patient have a history of Mental Health Diagnosis?: No         Means of Transportation  [facility] Family transport  In the past 12 months, has lack of transportation kept you from medical appointments or from getting medications?: No  In the past 12 months, has lack of transportation kept you from meetings, work, or from getting things needed for daily living?: No  Was application for public transport provided?: N/A        DISCHARGE DETAILS:    Discharge planning discussed with[de-identified] patient  Freedom of Choice: Yes                   Contacts  Patient Contacts: briana Stewart  Relationship to Patient[de-identified] Family  Contact Method: Phone  Phone Number: [phone]  Reason/Outcome: Continuity of Care,Emergency Contact,Discharge Planning              Other Referral/Resources/Interventions Provided:  Referral Comments: wife has caregivers who make meals for him and wife    Would you like to participate in our [address] service program?  : No - Declined     CM reviewed d/c planning process including the following: identifying help at home, patient preference for d/c planning needs, Discharge Lounge, [facility] Meds to Bed program, availability of treatment team to discuss questions or concerns patient and/or family may have regarding understanding medications and recognizing signs and symptoms once discharged  CM also encouraged patient to follow up with all recommended appointments after discharge  Patient advised of importance for patient and family to participate in managing patients medical well being  Patient/caregiver received discharge checklist  Content reviewed  Patient/caregiver encouraged to participate in discharge plan of care prior to discharge home

## 2022-04-06 NOTE — ANESTHESIA PREPROCEDURE EVALUATION
Procedure:  EGD    Relevant Problems   CARDIO   (+) Coronary artery disease involving native coronary artery of native heart without angina pectoris   (+) History of PTCA   (+) Primary hypertension   (+) Pure hypercholesterolemia      GI/HEPATIC   (+) GERD (gastroesophageal reflux disease)   (+) Malignant neoplasm of head of pancreas (HCC)      /RENAL   (+) Prostate cancer (HCC)   (+) Stage 3 chronic kidney disease (HCC)      HEMATOLOGY   (+) Anemia   (+) Blood loss anemia        Physical Exam    Airway    Mallampati score: III  TM Distance: >3 FB  Neck ROM: full     Dental   No notable dental hx     Cardiovascular  Cardiovascular exam normal    Pulmonary  Pulmonary exam normal     Other Findings    Stent placed 5 yrs ago  No other cardiac history  Gets SOB going up stairs  Nauseous in pre-op, felt like vomiting but never did  Echo 11/2021 EF 40-50%  Poor study    Anesthesia Plan  ASA Score- 3     Anesthesia Type- general with ASA Monitors  Additional Monitors:   Airway Plan: ETT  Plan Factors-Exercise tolerance (METS): >4 METS  Chart reviewed  EKG reviewed  Existing labs reviewed  Patient summary reviewed  Patient is not a current smoker  Induction- intravenous  Postoperative Plan-     Informed Consent- Anesthetic plan and risks discussed with patient  I personally reviewed this patient with the CRNA  Discussed and agreed on the Anesthesia Plan with the CRNA  José Miguel Tyler

## 2022-04-06 NOTE — PLAN OF CARE
Problem: PAIN - ADULT  Goal: Verbalizes/displays adequate comfort level or baseline comfort level  Description: Interventions:  - Encourage patient to monitor pain and request assistance  - Assess pain using appropriate pain scale  - Administer analgesics based on type and severity of pain and evaluate response  - Implement non-pharmacological measures as appropriate and evaluate response  - Consider cultural and social influences on pain and pain management  - Notify physician/advanced practitioner if interventions unsuccessful or patient reports new pain  Outcome: Progressing     Problem: GASTROINTESTINAL - ADULT  Goal: Minimal or absence of nausea and/or vomiting  Description: INTERVENTIONS:  - Administer IV fluids if ordered to ensure adequate hydration  - Maintain NPO status until nausea and vomiting are resolved  - Nasogastric tube if ordered  - Administer ordered antiemetic medications as needed  - Provide nonpharmacologic comfort measures as appropriate  - Advance diet as tolerated, if ordered  - Consider nutrition services referral to assist patient with adequate nutrition and appropriate food choices  Outcome: Progressing  Goal: Maintains or returns to baseline bowel function  Description: INTERVENTIONS:  - Assess bowel function  - Encourage oral fluids to ensure adequate hydration  - Administer IV fluids if ordered to ensure adequate hydration  - Administer ordered medications as needed  - Encourage mobilization and activity  - Consider nutritional services referral to assist patient with adequate nutrition and appropriate food choices  Outcome: Progressing  Goal: Maintains adequate nutritional intake  Description: INTERVENTIONS:  - Monitor percentage of each meal consumed  - Identify factors contributing to decreased intake, treat as appropriate  - Assist with meals as needed  - Monitor I&O, weight, and lab values if indicated  - Obtain nutrition services referral as needed  Outcome: Progressing Problem: GENITOURINARY - ADULT  Goal: Maintains or returns to baseline urinary function  Description: INTERVENTIONS:  - Assess urinary function  - Encourage oral fluids to ensure adequate hydration if ordered  - Administer IV fluids as ordered to ensure adequate hydration  - Administer ordered medications as needed  - Offer frequent toileting  - Follow urinary retention protocol if ordered  Outcome: Progressing  Goal: Absence of urinary retention  Description: INTERVENTIONS:  - Assess patients ability to void and empty bladder  - Monitor I/O  - Bladder scan as needed  - Discuss with physician/AP medications to alleviate retention as needed  - Discuss catheterization for long term situations as appropriate  Outcome: Progressing     Problem: Nutrition/Hydration-ADULT  Goal: Nutrient/Hydration intake appropriate for improving, restoring or maintaining nutritional needs  Description: Monitor and assess patient's nutrition/hydration status for malnutrition  Collaborate with interdisciplinary team and initiate plan and interventions as ordered  Monitor patient's weight and dietary intake as ordered or per policy  Utilize nutrition screening tool and intervene as necessary  Determine patient's food preferences and provide high-protein, high-caloric foods as appropriate       INTERVENTIONS:  - Monitor oral intake, urinary output, labs, and treatment plans  - Assess nutrition and hydration status and recommend course of action  - Evaluate amount of meals eaten  - Assist patient with eating if necessary   - Allow adequate time for meals  - Recommend/ encourage appropriate diets, oral nutritional supplements, and vitamin/mineral supplements  - Order, calculate, and assess calorie counts as needed  - Recommend, monitor, and adjust tube feedings and TPN/PPN based on assessed needs  - Assess need for intravenous fluids  - Provide specific nutrition/hydration education as appropriate  - Include patient/family/caregiver in decisions related to nutrition  Outcome: Progressing     Problem: Potential for Falls  Goal: Patient will remain free of falls  Description: INTERVENTIONS:  - Educate patient/family on patient safety including physical limitations  - Instruct patient to call for assistance with activity   - Consult OT/PT to assist with strengthening/mobility   - Keep Call bell within reach  - Keep bed low and locked with side rails adjusted as appropriate  - Keep care items and personal belongings within reach  - Initiate and maintain comfort rounds  - Make Fall Risk Sign visible to staff  - Offer Toileting every 2 Hours, in advance of need  - Initiate/Maintain alarm  - Obtain necessary fall risk management equipment  - Apply yellow socks and bracelet for high fall risk patients  - Consider moving patient to room near nurses station  Outcome: Progressing

## 2022-04-06 NOTE — PLAN OF CARE
Problem: PAIN - ADULT  Goal: Verbalizes/displays adequate comfort level or baseline comfort level  Description: Interventions:  - Encourage patient to monitor pain and request assistance  - Assess pain using appropriate pain scale  - Administer analgesics based on type and severity of pain and evaluate response  - Implement non-pharmacological measures as appropriate and evaluate response  - Consider cultural and social influences on pain and pain management  - Notify physician/advanced practitioner if interventions unsuccessful or patient reports new pain  Outcome: Progressing     Problem: GASTROINTESTINAL - ADULT  Goal: Minimal or absence of nausea and/or vomiting  Description: INTERVENTIONS:  - Administer IV fluids if ordered to ensure adequate hydration  - Maintain NPO status until nausea and vomiting are resolved  - Nasogastric tube if ordered  - Administer ordered antiemetic medications as needed  - Provide nonpharmacologic comfort measures as appropriate  - Advance diet as tolerated, if ordered  - Consider nutrition services referral to assist patient with adequate nutrition and appropriate food choices  Outcome: Progressing  Goal: Maintains or returns to baseline bowel function  Description: INTERVENTIONS:  - Assess bowel function  - Encourage oral fluids to ensure adequate hydration  - Administer IV fluids if ordered to ensure adequate hydration  - Administer ordered medications as needed  - Encourage mobilization and activity  - Consider nutritional services referral to assist patient with adequate nutrition and appropriate food choices  Outcome: Progressing  Goal: Maintains adequate nutritional intake  Description: INTERVENTIONS:  - Monitor percentage of each meal consumed  - Identify factors contributing to decreased intake, treat as appropriate  - Assist with meals as needed  - Monitor I&O, weight, and lab values if indicated  - Obtain nutrition services referral as needed  Outcome: Progressing Problem: GENITOURINARY - ADULT  Goal: Maintains or returns to baseline urinary function  Description: INTERVENTIONS:  - Assess urinary function  - Encourage oral fluids to ensure adequate hydration if ordered  - Administer IV fluids as ordered to ensure adequate hydration  - Administer ordered medications as needed  - Offer frequent toileting  - Follow urinary retention protocol if ordered  Outcome: Progressing  Goal: Absence of urinary retention  Description: INTERVENTIONS:  - Assess patients ability to void and empty bladder  - Monitor I/O  - Bladder scan as needed  - Discuss with physician/AP medications to alleviate retention as needed  - Discuss catheterization for long term situations as appropriate  Outcome: Progressing     Problem: METABOLIC, FLUID AND ELECTROLYTES - ADULT  Goal: Fluid balance maintained  Description: INTERVENTIONS:  - Monitor labs   - Monitor I/O and WT  - Instruct patient on fluid and nutrition as appropriate  - Assess for signs & symptoms of volume excess or deficit  Outcome: Progressing     Problem: Nutrition/Hydration-ADULT  Goal: Nutrient/Hydration intake appropriate for improving, restoring or maintaining nutritional needs  Description: Monitor and assess patient's nutrition/hydration status for malnutrition  Collaborate with interdisciplinary team and initiate plan and interventions as ordered  Monitor patient's weight and dietary intake as ordered or per policy  Utilize nutrition screening tool and intervene as necessary  Determine patient's food preferences and provide high-protein, high-caloric foods as appropriate       INTERVENTIONS:  - Monitor oral intake, urinary output, labs, and treatment plans  - Assess nutrition and hydration status and recommend course of action  - Evaluate amount of meals eaten  - Assist patient with eating if necessary   - Allow adequate time for meals  - Recommend/ encourage appropriate diets, oral nutritional supplements, and vitamin/mineral supplements  - Order, calculate, and assess calorie counts as needed  - Recommend, monitor, and adjust tube feedings and TPN/PPN based on assessed needs  - Assess need for intravenous fluids  - Provide specific nutrition/hydration education as appropriate  - Include patient/family/caregiver in decisions related to nutrition  Outcome: Progressing     Problem: Potential for Falls  Goal: Patient will remain free of falls  Description: INTERVENTIONS:  - Educate patient/family on patient safety including physical limitations  - Instruct patient to call for assistance with activity   - Consult OT/PT to assist with strengthening/mobility   - Keep Call bell within reach  - Keep bed low and locked with side rails adjusted as appropriate  - Keep care items and personal belongings within reach  - Initiate and maintain comfort rounds  - Apply yellow socks and bracelet for high fall risk patients  - Consider moving patient to room near nurses station  Outcome: Progressing

## 2022-04-06 NOTE — QUICK NOTE
Patient's daughter, Evens Varela, called and updated in regards to patient's current clinical progress and plans of care  Questions answered and concerns addressed       EMILY Chairez   PGY-1 Internal Medicine   Bernardino Mckeon

## 2022-04-06 NOTE — ASSESSMENT & PLAN NOTE
Malnutrition Findings:   Adult Malnutrition type: Chronic illness  Adult Degree of Malnutrition: Other severe protein calorie malnutrition  Malnutrition Characteristics: Inadequate energy,Weight loss                  360 Statement: Related to metastatic pancreatic cancer as evidenced by 20% weight loss over the past 4 months and <75% energy intake needs met >1 month treated with pending diet advancement    BMI Findings: Body mass index is 24 94 kg/m²       · Secondary to chronic illness - evidenced by 20% weight loss over the past 4 months and <75% energy intake needs met >1 month  · Nutrition consult

## 2022-04-06 NOTE — PROGRESS NOTES
[facility]  Progress Note Annika Lopez 1945, 67 y o  male MRN: [id number]  Unit/Bed#: [facility] 815-01 Encounter: [id number]  Primary Care Provider: Trudy Harris DO   Date and time admitted to hospital: 4/4/2022 10:56 AM    * Duodenal stricture  Assessment & Plan  68 y/o male with a history of newly diagnosed metastatic pancreatic adenocarcinoma w/ mets to liver, completed 3 cycles of chemo & currently on Gemcitabine & Oxaliplatin  Recent EGD 3/25 showed a stricture of 1st part of duodenum  Was scheduled for EGD 4/15 with duodenal stent placement for stricture, however now presents with intent to expedite procedure d/t worsening inability to tolerate p o  · GI consult - EGD with duodenal stent placement   · Progress diet as tolerated following procedure   · Zofran p r n  Malignant neoplasm of head of pancreas [facility])  Assessment & Plan  History of newly diagnosed metastatic pancreatic adenocarcinoma w/ mets to liver, completed 3 cycles of chemo (currently on Gemcitabine & Oxaliplatin)  S/p ERCP w/ biliary stent placement 1/20/22  Follows with Dr Alban Fagan outpatient  · Continue outpatient follow-up with hem/onc  · PRN's:  · Tylenol 650 mg p o  Q6H   · Oxycodone 5mg/10 mg p o  Q4H   · Dilaudid 0 5 mg IV Q4H   · Zofran 4 mg IV Q6H   · Reglan 10 mg IV Q6H     Leukocytosis  Assessment & Plan  Leukocytosis on admission with WBC 23  No clear source of infection  UA negative  Suspect secondary to Neulasta administerd w/ last dose of chemo      · Monitor off antibiotics   · Follow-up blood cultures  · Trend WBC and temperature     Anemia  Assessment & Plan  Likely secondary to malignancy/chronic blood loss  No source of bleeding was identified on last EGD, however it was not an ideal study due to retained foods and blood clot  Currently no signs of active bleeding       · Scheduled for EGD  · Monitor hemoglobin   · Transfuse for hemoglobin < 7    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 [facility])  Assessment & Plan  S/p ERCP with biliary stent placement 22  Bilirubin now normalized  · Continue to monitor   · Management of pancreatic cancer as above     GERD (gastroesophageal reflux disease)  Assessment & Plan  Stable  · Continue protonix 20 mg daily        VTE Pharmacologic Prophylaxis:   Pharmacologic: Enoxaparin (Lovenox)  Mechanical VTE Prophylaxis in Place: Yes    Patient Centered Rounds: I have performed bedside rounds with nursing staff today  Time Spent for Care: 20 minutes  More than 50% of total time spent on counseling and coordination of care as described above  Current Length of Stay: 2 day(s)    Current Patient Status: Inpatient     Discharge Plan / Estimated Discharge Date: < 48 hrs      Code Status: Level 1 - Full Code      Subjective:   Patient reports ongoing nausea  Required Reglan in addition to Zofran overnight  Understands plan for EGD with duodenal stent placement  Hopeful to eat after procedure  No other acute complaints at this time  Objective:     Vitals:   Temp (24hrs), Av 3 °F (36 8 °C), Min:98 1 °F (36 7 °C), Max:98 5 °F (36 9 °C)    Temp:  [98 1 °F (36 7 °C)-98 5 °F (36 9 °C)] 98 1 °F (36 7 °C)  Resp:  [18] 18  BP: (111-124)/(65-84) 124/84  Body mass index is 24 94 kg/m²  Input and Output Summary (last 24 hours): Intake/Output Summary (Last 24 hours) at 2022 0849  Last data filed at 2022 0241  Gross per 24 hour   Intake 998 75 ml   Output 325 ml   Net 673 75 ml          Physical Exam:   General: Right chest port in place  Comfortable  Awake, alert, and conversant  No acute distress  Eyes: Normal conjunctiva, anicteric  Round symmetric pupils  ENT: Hearing grossly intact  No nasal discharge  Neck: Neck is supple  No masses or thyromegaly  Respiratory: CTAB  Respirations are non-labored  No wheezing  Cardiovascular: RRR  No lower extremity edema  Abdomen: Mild epigastric abdominal tenderness  Bowel sounds present  No guarding or rigidity  Skin: Warm  No rashes or ulcers  Neuro: A&O x 3  Sensation and CN II-XII grossly normal    Psych: Cooperative  Appropriate mood and affect  Normal judgement  Additional Data:     Labs:    Results from last 7 days   Lab Units 04/06/22  0514 04/05/22  1101 04/04/22  1207   WBC Thousand/uL 17 01*   < > 23 41*   HEMOGLOBIN g/dL 7 7*   < > 8 6*   HEMATOCRIT % 25 0*   < > 27 2*   PLATELETS Thousands/uL 213   < > 144*   LYMPHO PCT %  --   --  4*   MONO PCT %  --   --  5   EOS PCT %  --   --  0    < > = values in this interval not displayed  Results from last 7 days   Lab Units 04/06/22  0514   POTASSIUM mmol/L 3 8   CHLORIDE mmol/L 99*   CO2 mmol/L 32   BUN mg/dL 11   CREATININE mg/dL 0 72   CALCIUM mg/dL 9 1   ALK PHOS U/L 190*   ALT U/L 23   AST U/L 38     Results from last 7 days   Lab Units 04/04/22  1307   INR  1 24*       * I Have Reviewed All Lab Data Listed Above  * Additional Pertinent Lab Tests Reviewed: [facility]    Imaging: Reviewed      Recent Cultures (last 7 days):     Results from last 7 days   Lab Units 04/04/22  1339 04/04/22  1308   BLOOD CULTURE  No Growth at 24 hrs  No Growth at 24 hrs         Last 24 Hours Medication List:   Current Facility-Administered Medications   Medication Dose Route Frequency Provider Last Rate    acetaminophen  650 mg Oral Q6H PRN Lazara Salas MD      enoxaparin  40 mg Subcutaneous Daily [place] [facility]      fluticasone  1 spray Each Nare Daily Moses Silva DO      HYDROmorphone  0 5 mg Intravenous Q4H PRN aLzara Salas MD      loratadine  10 mg Oral Daily Moses Silva DO      metoclopramide  10 mg Intravenous Q6H PRN Sumaya Varma MD      mirtazapine  7 5 mg Oral HS Lazara Salas MD      multi-electrolyte  75 mL/hr Intravenous Continuous [place] MD Maritza 75 mL/hr (04/05/22 2399)    ondansetron  4 mg Intravenous Q6H PRN Lazara Salas MD      oxyCODONE  10 mg Oral Q4H PRN Garner Jeans Yara Dobson MD      oxyCODONE  5 mg Oral Q4H PRN Hazel Lanes, MD      pantoprazole  20 mg Oral Early Morning Hazel Lanes, MD      senna-docusate sodium  1 tablet Oral HS Saskia Watters DO          Today, Patient Was Seen By: Bárbara Amado DO    ** Please Note: Dragon 360 Dictation voice to text software may have been used in the creation of this document   **

## 2022-04-07 NOTE — PROGRESS NOTES
[facility] - Progress Note  Mansi Dunn 67 y o  male MRN: [id number]  Unit/Bed#: [facility] 815-01 Encounter: [id number]      ASSESSMENT & PLAN:    76-year-old male with metastatic pancreatic adenocarcinoma status post ERCP with biliary stent placement on 01/22 and now on chemotherapy, CKD3, and GERD who presented with outlet obstruction from duodenal stricture and sent in from oncology office  GI consultation for same  1  Duodenal stricture, GOO - status post EGD with duodenal stent placement on 4/6  Significant narrowing noted  Placed 9 cm x 22 Fr uncovered metal stent across stricture but with significantly tight even with stent  Stent dilated to 10 mm  Unable to be traversed with standard endoscope despite dilation  Clinically feeling improved  · Continue clear liquid diet for now  · Check UGI series to assess stent; may take some time for the stent to open up further  · Depending on clinical course and UGI series findings, can consider advancing diet to pureed/soft food later  · If stent is ineffective, may require gastrojejunostomy either endoscopically with [employer] or surgically  · Follow clinically  · Antiemetics, supportive care per primary team    2  Pancreatic adenocarcinoma - on neoadjuvant chemotherapy  Recently completed 3rd round of chemotherapy with oxaliplatin and gemcitabine  · Outpatient follow-up with oncology  · GI recommendations otherwise as noted above    ______________________________________________________________________    SUBJECTIVE:     Patient seen and evaluated at bedside  Feeling a little better today  Better tolerated clear liquids      Medication Administration - last 24 hours from 04/06/2022 0808 to 04/07/2022 7107       Date/Time Order Dose Route Action Action by     04/06/2022 2149 mirtazapine (REMERON) tablet 7 5 mg 7 5 mg Oral Given Bill Smiley RN     04/07/2022 0501 pantoprazole (PROTONIX) EC tablet 20 mg 20 mg Oral Given Bill Smiley RN 04/07/2022 0341 multi-electrolyte (PLASMALYTE-A/ISOLYTE-S PH 7 4) IV solution 75 mL/hr Intravenous Restarted Soumya Briggs RN     04/07/2022 0310 multi-electrolyte (PLASMALYTE-A/ISOLYTE-S PH 7 4) IV solution 0 mL/hr Intravenous Stopped Soumya Briggs, MARCO     04/06/2022 2346 multi-electrolyte (PLASMALYTE-A/ISOLYTE-S PH 7 4) IV solution 75 mL/hr Intravenous [address] [facility]     04/06/2022 2345 multi-electrolyte (PLASMALYTE-A/ISOLYTE-S PH 7 4) IV solution 0 mL/hr Intravenous Stopped Soumya Briggs RN     04/06/2022 7078 multi-electrolyte (PLASMALYTE-A/ISOLYTE-S PH 7 4) IV solution 75 mL/hr Intravenous [address] Sapphire Martinez, [address] [place] [address]     04/06/2022 5161 multi-electrolyte (PLASMALYTE-A/ISOLYTE-S PH 7 4) IV solution 0 mL/hr Intravenous Stopped Ton Taveras, MARCO     04/06/2022 1434 ondansetron (ZOFRAN) injection 4 mg 4 mg Intravenous Given Juan David Cortez, MARCO     04/06/2022 0852 enoxaparin (LOVENOX) subcutaneous injection 40 mg 40 mg Subcutaneous Given [facility]     04/06/2022 2152 oxyCODONE (ROXICODONE) IR tablet 5 mg 5 mg Oral Given Soumya Briggs RN     04/06/2022 0853 fluticasone (FLONASE) 50 mcg/act nasal spray 1 spray 1 spray Each Nare Given [facility]     04/06/2022 0852 loratadine (CLARITIN) tablet 10 mg 10 mg Oral Given [facility]     04/06/2022 2149 senna-docusate sodium (SENOKOT S) 8 6-50 mg per tablet 1 tablet 1 tablet Oral Refused Soumya Briggs RN     04/06/2022 4710 metoclopramide (REGLAN) injection 10 mg 10 mg Intravenous Given [facility]     04/06/2022 1545 iohexol (OMNIPAQUE) 240 MG/ML solution 50 mL 45 mL Injection Given by Other Sharon Santiago     04/07/2022 0341 piperacillin-tazobactam (ZOSYN) 3 375 g in sodium chloride 0 9 % 100 mL IVPB 0 g Intravenous Stopped Soumya Briggs RN     04/07/2022 0311 piperacillin-tazobactam (ZOSYN) 3 375 g in sodium chloride 0 9 % 100 mL IVPB 3 375 g Intravenous New Bag Soumya Briggs RN          OBJECTIVE:     Objective   Blood pressure 104/62, pulse 100, temperature 97 5 °F (36 4 °C), resp  rate 18, height 5' 8" (1 727 m), weight 74 4 kg (164 lb), SpO2 93 %  Body mass index is 24 94 kg/m²  Intake/Output Summary (Last 24 hours) at 4/7/2022 9413  Last data filed at 4/7/2022 0341  Gross per 24 hour   Intake 2038 75 ml   Output 100 ml   Net 1938 75 ml       PHYSICAL EXAM:   General Appearance: Awake and alert, in no acute distress; elderly male, chronically ill-appearing  Abdomen: Soft, non-tender, non-distended; bowel sounds normal; no masses or no organomegaly    Invasive Devices  Report    Central Venous Catheter Line            Port A Cath 02/10/22 Right Chest 55 days          Peripheral Intravenous Line            Peripheral IV 04/04/22 Right Antecubital 2 days                LAB RESULTS:  Admission on 04/04/2022   Component Date Value    WBC 04/04/2022 23 41*    RBC 04/04/2022 2 79*    Hemoglobin 04/04/2022 8 6*    Hematocrit 04/04/2022 27 2*    MCV 04/04/2022 98     MCH 04/04/2022 30 8     MCHC 04/04/2022 31 6     RDW 04/04/2022 19 6*    MPV 04/04/2022 11 9     Platelets 20/49/1178 144*    Sodium 04/04/2022 133*    Potassium 04/04/2022 3 6     Chloride 04/04/2022 94*    CO2 04/04/2022 31     ANION GAP 04/04/2022 8     BUN 04/04/2022 15     Creatinine 04/04/2022 1 01     Glucose 04/04/2022 133     Calcium 04/04/2022 9 7     Corrected Calcium 04/04/2022 11 0*    AST 04/04/2022 66*    ALT 04/04/2022 37     Alkaline Phosphatase 04/04/2022 238*    Total Protein 04/04/2022 7 1     Albumin 04/04/2022 2 4*    Total Bilirubin 04/04/2022 0 77     eGFR 04/04/2022 71     Lipase 04/04/2022 18*    ABO Grouping 04/04/2022 AB     Rh Factor 04/04/2022 Positive     Antibody Screen 04/04/2022 Negative     Specimen Expiration Date 04/04/2022 [id number]     Gram Stain Result 04/04/2022 Gram negative rods*    Blood Culture 04/04/2022 No Growth at 48 hrs       LACTIC ACID 04/04/2022 1 8     Procalcitonin 04/04/2022 0 35*    Protime 04/04/2022 15 1*    INR 04/04/2022 1 24*    PTT 04/04/2022 31     Color, UA 04/04/2022 Yellow     Clarity, UA 04/04/2022 Clear     Specific Gravity, UA 04/04/2022 1 019     pH, UA 04/04/2022 7 0     Leukocytes, UA 04/04/2022 Negative     Nitrite, UA 04/04/2022 Negative     Protein, UA 04/04/2022 30 (1+)*    Glucose, UA 04/04/2022 Negative     Ketones, UA 04/04/2022 Trace*    Urobilinogen, UA 04/04/2022 3 0*    Bilirubin, UA 04/04/2022 Negative     Blood, UA 04/04/2022 Negative     Segmented % 04/04/2022 90*    Bands % 04/04/2022 1     Lymphocytes % 04/04/2022 4*    Monocytes % 04/04/2022 5     Eosinophils, % 04/04/2022 0     Basophils % 04/04/2022 0     Absolute Neutrophils 04/04/2022 21 30*    Lymphocytes Absolute 04/04/2022 0 94     Monocytes Absolute 04/04/2022 1 17     Eosinophils Absolute 04/04/2022 0 00     Basophils Absolute 04/04/2022 0 00     RBC Morphology 04/04/2022 Present     Anisocytosis 04/04/2022 Present     Macrocytes 04/04/2022 Present     Microcytes 04/04/2022 Present     Polychromasia 04/04/2022 Present     Tear Drop Cells 04/04/2022 Present     Platelet Estimate 02/38/9595 Adequate     Artifact 04/04/2022 Present     RBC, UA 04/04/2022 1-2     WBC, UA 04/04/2022 1-2     Epithelial Cells 04/04/2022 Occasional     Bacteria, UA 04/04/2022 None Seen     MUCUS THREADS 04/04/2022 Occasional*    Folate 04/04/2022 8 3     Vitamin B-12 04/04/2022 >6,000*    Sodium 04/05/2022 132*    Potassium 04/05/2022 4 3     Chloride 04/05/2022 99*    CO2 04/05/2022 27     ANION GAP 04/05/2022 6     BUN 04/05/2022 12     Creatinine 04/05/2022 0 75     Glucose 04/05/2022 89     Calcium 04/05/2022 8 6     eGFR 04/05/2022 88     Magnesium 04/05/2022 1 8     Phosphorus 04/05/2022 3 0     WBC 04/05/2022 18 06*    RBC 04/05/2022 2 51*    Hemoglobin 04/05/2022 7 8*    Hematocrit 04/05/2022 24 9*    MCV 04/05/2022 99*    MCH 04/05/2022 31 1     MCHC 04/05/2022 31 3*    RDW 04/05/2022 19 8*    Platelets 17/37/4646 162     MPV 04/05/2022 11 1     WBC 04/06/2022 17 01*    RBC 04/06/2022 2 50*    Hemoglobin 04/06/2022 7 7*    Hematocrit 04/06/2022 25 0*    MCV 04/06/2022 100*    MCH 04/06/2022 30 8     MCHC 04/06/2022 30 8*    RDW 04/06/2022 20 2*    MPV 04/06/2022 11 3     Platelets 72/13/2714 213     Sodium 04/06/2022 135*    Potassium 04/06/2022 3 8     Chloride 04/06/2022 99*    CO2 04/06/2022 32     ANION GAP 04/06/2022 4     BUN 04/06/2022 11     Creatinine 04/06/2022 0 72     Glucose 04/06/2022 107     Calcium 04/06/2022 9 1     Corrected Calcium 04/06/2022 10 7*    AST 04/06/2022 38     ALT 04/06/2022 23     Alkaline Phosphatase 04/06/2022 190*    Total Protein 04/06/2022 6 2*    Albumin 04/06/2022 2 0*    Total Bilirubin 04/06/2022 0 68     eGFR 04/06/2022 89     Segmented % 04/06/2022 75     Bands % 04/06/2022 17*    Lymphocytes % 04/06/2022 0*    Monocytes % 04/06/2022 7     Eosinophils, % 04/06/2022 1     Basophils % 04/06/2022 0     Absolute Neutrophils 04/06/2022 15 65*    Lymphocytes Absolute 04/06/2022 0 00*    Monocytes Absolute 04/06/2022 1 19     Eosinophils Absolute 04/06/2022 0 17     Basophils Absolute 04/06/2022 0 00     RBC Morphology 04/06/2022 Present     Anisocytosis 04/06/2022 Present     Macrocytes 04/06/2022 Present     Poikilocytes 04/06/2022 Present     Polychromasia 04/06/2022 Present     Platelet Estimate 08/52/4517 Adequate     Artifact 04/06/2022 Present     ALL TARGETS 04/04/2022 Not Detected     WBC 04/07/2022 15 53*    RBC 04/07/2022 2 43*    Hemoglobin 04/07/2022 7 4*    Hematocrit 04/07/2022 24 6*    MCV 04/07/2022 101*    MCH 04/07/2022 30 5     MCHC 04/07/2022 30 1*    RDW 04/07/2022 20 2*    MPV 04/07/2022 10 7     Platelets 01/70/8999 269     Sodium 04/07/2022 138     Potassium 04/07/2022 4 0     Chloride 04/07/2022 99*    CO2 04/07/2022 34*    ANION GAP 04/07/2022 5  BUN 04/07/2022 13     Creatinine 04/07/2022 0 68     Glucose 04/07/2022 120     Calcium 04/07/2022 8 8     eGFR 04/07/2022 92        RADIOLOGY RESULTS: I have personally reviewed pertinent imaging studies  EMILY Leal  Chief Gastroenterology Fellow  [facility]  Available on [email or web address]

## 2022-04-07 NOTE — PROGRESS NOTES
[address] [facility]  Progress Note John Marrero 1945, 67 y o  male MRN: [id number]  Unit/Bed#: [facility] 815-01 Encounter: [id number]  Primary Care Provider: Bala Haas DO   Date and time admitted to hospital: 4/4/2022 10:56 AM    * Duodenal stricture  Assessment & Plan  66 y/o male with a history of newly diagnosed metastatic pancreatic adenocarcinoma w/ mets to liver, completed 3 cycles of chemo & currently on Gemcitabine & Oxaliplatin  Recent EGD 3/25 showed a stricture of 1st part of duodenum  Was scheduled for EGD 4/15 with duodenal stent placement for stricture, however now presents with intent to expedite procedure d/t worsening inability to tolerate p o  · GI consulted  · 4/6: s/p EGD with duodenal stent placement  · Upper GI series to assess stent  · Continue clear liquid diet  · Plan to advance diet tomorrow depending on above results  · Zofran p r n  Malignant neoplasm of head of pancreas [facility])  Assessment & Plan  History of newly diagnosed metastatic pancreatic adenocarcinoma w/ mets to liver, completed 3 cycles of chemo (currently on Gemcitabine & Oxaliplatin)  S/p ERCP w/ biliary stent placement 1/20/22  Follows with Dr Ros Pierre outpatient  · Continue outpatient follow-up with hem/onc  · PRN's:  · Tylenol 650 mg p o  Q6H   · Oxycodone 5mg/10 mg p o  Q4H   · Dilaudid 0 5 mg IV Q4H   · Zofran 4 mg IV Q6H   · Reglan 10 mg IV Q6H     Leukocytosis  Assessment & Plan  Leukocytosis on admission with WBC 23  No clear source of infection  UA negative  Suspect secondary to Neulasta administerd w/ last dose of chemo      · Monitor off antibiotics   · Follow-up blood cultures  · Trend WBC and temperature     Anemia  Assessment & Plan  Likely secondary to malignancy/chronic blood loss  No source of bleeding was identified on last EGD, however it was not an ideal study due to retained foods and blood clot  Currently no signs of active bleeding       · Scheduled for EGD  · Monitor hemoglobin   · Transfuse for hemoglobin < 7    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 [facility])  Assessment & Plan  S/p ERCP with biliary stent placement 1/20/22  Bilirubin now normalized  · Continue to monitor   · Management of pancreatic cancer as above     Severe protein-calorie malnutrition ([facility] [address] )  Assessment & Plan  Malnutrition Findings:   Adult Malnutrition type: Chronic illness  Adult Degree of Malnutrition: Other severe protein calorie malnutrition  Malnutrition Characteristics: Inadequate energy,Weight loss                  360 Statement: Related to metastatic pancreatic cancer as evidenced by 20% weight loss over the past 4 months and <75% energy intake needs met >1 month treated with pending diet advancement    BMI Findings: Body mass index is 24 94 kg/m²  · Secondary to chronic illness - evidenced by 20% weight loss over the past 4 months and <75% energy intake needs met >1 month  · Nutrition consult         GERD (gastroesophageal reflux disease)  Assessment & Plan  Stable  · Continue protonix 20 mg daily        VTE Pharmacologic Prophylaxis:   Pharmacologic: Enoxaparin (Lovenox)  Mechanical VTE Prophylaxis in Place: Yes    Patient Centered Rounds: I have performed bedside rounds with nursing staff today  Time Spent for Care: 20 minutes  More than 50% of total time spent on counseling and coordination of care as described above  Current Length of Stay: 3 day(s)    Current Patient Status: Inpatient   Certification Statement: The patient will continue to require additional inpatient hospital stay due to Upper GI series, progressing diet    Discharge Plan / Estimated Discharge Date:  Less than 48 hours      Code Status: Level 1 - Full Code      Subjective:   Patient reports feeling better today  Still reports some nausea but requiring less antiemetic  Reports that he was able to consume some broth, Jell-O and keep it down    Denies any fevers, chills, chest pain, shortness a breath, significant abdominal discomfort, changes in bowel movements  Objective:     Vitals:   Temp (24hrs), Av 2 °F (36 8 °C), Min:97 5 °F (36 4 °C), Max:99 1 °F (37 3 °C)    Temp:  [97 5 °F (36 4 °C)-99 1 °F (37 3 °C)] 97 5 °F (36 4 °C)  HR:  [] 100  Resp:  [14-20] 18  BP: ()/(56-80) 104/62  SpO2:  [90 %-99 %] 93 %  Body mass index is 24 94 kg/m²  Input and Output Summary (last 24 hours): Intake/Output Summary (Last 24 hours) at 2022 1118  Last data filed at 2022 0341  Gross per 24 hour   Intake 1813 75 ml   Output 100 ml   Net 1713 75 ml       Physical Exam:   General: Right chest port in place  Comfortable  Awake, alert, and conversant  No acute distress  Eyes: Normal conjunctiva, anicteric  Round symmetric pupils  ENT: Hearing grossly intact  No nasal discharge  Neck: Neck is supple  No masses or thyromegaly  Respiratory: CTAB  Respirations are non-labored  No wheezing  Cardiovascular: RRR  No lower extremity edema  Abdomen:  Minimal epigastric abdominal tenderness  Bowel sounds present  No guarding or rigidity    Skin: Warm  No rashes or ulcers  Neuro: A&O x 3  Sensation and CN II-XII grossly normal    Psych: Cooperative  Appropriate mood and affect  Normal judgement      Additional Data:     Labs:    Results from last 7 days   Lab Units 22  0514   WBC Thousand/uL 15 53*   < > 17 01*   HEMOGLOBIN g/dL 7 4*   < > 7 7*   HEMATOCRIT % 24 6*   < > 25 0*   PLATELETS Thousands/uL 269   < > 213   LYMPHO PCT %  --   --  0*   MONO PCT %  --   --  7   EOS PCT %  --   --  1    < > = values in this interval not displayed       Results from last 7 days   Lab Units 2214 22  0514   POTASSIUM mmol/L 4 0   < > 3 8   CHLORIDE mmol/L 99*   < > 99*   CO2 mmol/L 34*   < > 32   BUN mg/dL 13   < > 11   CREATININE mg/dL 0 68   < > 0 72   CALCIUM mg/dL 8 8   < > 9 1   ALK PHOS U/L  --   --  190*   ALT U/L  -- --  23   AST U/L  --   --  38    < > = values in this interval not displayed  Results from last 7 days   Lab Units 04/04/22  1307   INR  1 24*       * I Have Reviewed All Lab Data Listed Above  * Additional Pertinent Lab Tests Reviewed: [address] Admission Reviewed    Imaging:  Reviewed    Recent Cultures (last 7 days):     Results from last 7 days   Lab Units 04/04/22  1339 04/04/22  1308   BLOOD CULTURE  No Growth at 48 hrs   --    GRAM STAIN RESULT   --  Gram negative rods*       Last 24 Hours Medication List:   Current Facility-Administered Medications   Medication Dose Route Frequency Provider Last Rate    acetaminophen  650 mg Oral Q6H PRN Clydene Nelly, MD      enoxaparin  40 mg Subcutaneous Daily Clydene Nelly, MD      fluticasone  1 spray Each Nare Daily Moses Silva DO      HYDROmorphone  0 5 mg Intravenous Q4H PRN Clydene Nelly, MD      loratadine  10 mg Oral Daily Moses Silva DO      metoclopramide  10 mg Intravenous Q6H PRN Lanjorge Coello, MD      mirtazapine  7 5 mg Oral HS Clydyemi Villegas, MD      multi-electrolyte  75 mL/hr Intravenous Continuous Clydene Nelly, MD 75 mL/hr (04/07/22 0341)    ondansetron  4 mg Intravenous Q6H PRN Clydene Downy, MD      oxyCODONE  10 mg Oral Q4H PRN Clydene Downy, MD      oxyCODONE  5 mg Oral Q4H PRN Clydene Downy, MD      pantoprazole  20 mg Oral Early Morning Clydene Nelly, MD      piperacillin-tazobactam  3 375 g Intravenous Q6H WILMER Conrad 3 375 g (04/07/22 0809)    senna-docusate sodium  1 tablet Oral HS Moses Silva DO          Today, Patient Was Seen By: Liza Watters DO    ** Please Note: Dragon 360 Dictation voice to text software may have been used in the creation of this document   **

## 2022-04-07 NOTE — PLAN OF CARE
Problem: PAIN - ADULT  Goal: Verbalizes/displays adequate comfort level or baseline comfort level  Description: Interventions:  - Encourage patient to monitor pain and request assistance  - Assess pain using appropriate pain scale  - Administer analgesics based on type and severity of pain and evaluate response  - Implement non-pharmacological measures as appropriate and evaluate response  - Consider cultural and social influences on pain and pain management  - Notify physician/advanced practitioner if interventions unsuccessful or patient reports new pain  Outcome: Progressing     Problem: GASTROINTESTINAL - ADULT  Goal: Minimal or absence of nausea and/or vomiting  Description: INTERVENTIONS:  - Administer IV fluids if ordered to ensure adequate hydration  - Maintain NPO status until nausea and vomiting are resolved  - Nasogastric tube if ordered  - Administer ordered antiemetic medications as needed  - Provide nonpharmacologic comfort measures as appropriate  - Advance diet as tolerated, if ordered  - Consider nutrition services referral to assist patient with adequate nutrition and appropriate food choices  Outcome: Progressing  Goal: Maintains or returns to baseline bowel function  Description: INTERVENTIONS:  - Assess bowel function  - Encourage oral fluids to ensure adequate hydration  - Administer IV fluids if ordered to ensure adequate hydration  - Administer ordered medications as needed  - Encourage mobilization and activity  - Consider nutritional services referral to assist patient with adequate nutrition and appropriate food choices  Outcome: Progressing  Goal: Maintains adequate nutritional intake  Description: INTERVENTIONS:  - Monitor percentage of each meal consumed  - Identify factors contributing to decreased intake, treat as appropriate  - Assist with meals as needed  - Monitor I&O, weight, and lab values if indicated  - Obtain nutrition services referral as needed  Outcome: Progressing Problem: GENITOURINARY - ADULT  Goal: Maintains or returns to baseline urinary function  Description: INTERVENTIONS:  - Assess urinary function  - Encourage oral fluids to ensure adequate hydration if ordered  - Administer IV fluids as ordered to ensure adequate hydration  - Administer ordered medications as needed  - Offer frequent toileting  - Follow urinary retention protocol if ordered  Outcome: Progressing  Goal: Absence of urinary retention  Description: INTERVENTIONS:  - Assess patients ability to void and empty bladder  - Monitor I/O  - Bladder scan as needed  - Discuss with physician/AP medications to alleviate retention as needed  - Discuss catheterization for long term situations as appropriate  Outcome: Progressing     Problem: METABOLIC, FLUID AND ELECTROLYTES - ADULT  Goal: Fluid balance maintained  Description: INTERVENTIONS:  - Monitor labs   - Monitor I/O and WT  - Instruct patient on fluid and nutrition as appropriate  - Assess for signs & symptoms of volume excess or deficit  Outcome: Progressing     Problem: Nutrition/Hydration-ADULT  Goal: Nutrient/Hydration intake appropriate for improving, restoring or maintaining nutritional needs  Description: Monitor and assess patient's nutrition/hydration status for malnutrition  Collaborate with interdisciplinary team and initiate plan and interventions as ordered  Monitor patient's weight and dietary intake as ordered or per policy  Utilize nutrition screening tool and intervene as necessary  Determine patient's food preferences and provide high-protein, high-caloric foods as appropriate       INTERVENTIONS:  - Monitor oral intake, urinary output, labs, and treatment plans  - Assess nutrition and hydration status and recommend course of action  - Evaluate amount of meals eaten  - Assist patient with eating if necessary   - Allow adequate time for meals  - Recommend/ encourage appropriate diets, oral nutritional supplements, and vitamin/mineral supplements  - Order, calculate, and assess calorie counts as needed  - Recommend, monitor, and adjust tube feedings and TPN/PPN based on assessed needs  - Assess need for intravenous fluids  - Provide specific nutrition/hydration education as appropriate  - Include patient/family/caregiver in decisions related to nutrition  Outcome: Progressing     Problem: Potential for Falls  Goal: Patient will remain free of falls  Description: INTERVENTIONS:  - Educate patient/family on patient safety including physical limitations  - Instruct patient to call for assistance with activity   - Consult OT/PT to assist with strengthening/mobility   - Keep Call bell within reach  - Keep bed low and locked with side rails adjusted as appropriate  - Keep care items and personal belongings within reach  - Initiate and maintain comfort rounds  - Consider moving patient to room near nurses station  Outcome: Progressing

## 2022-04-07 NOTE — ASSESSMENT & PLAN NOTE
66 y/o male with a history of newly diagnosed metastatic pancreatic adenocarcinoma w/ mets to liver, completed 3 cycles of chemo & currently on Gemcitabine & Oxaliplatin  Recent EGD 3/25 showed a stricture of 1st part of duodenum  Was scheduled for EGD 4/15 with duodenal stent placement for stricture, however now presents with intent to expedite procedure d/t worsening inability to tolerate p o  · GI consulted  · 4/6: s/p EGD with duodenal stent placement  · Upper GI series to assess stent  · Continue clear liquid diet  · Plan to advance diet tomorrow depending on above results  · Zofran p r n

## 2022-04-07 NOTE — PROGRESS NOTES
Spiritual Care Progress Note    2022  Patient: Daysi Pryor : 1945  Admission Date & Time: 2022 1056  MRN: [id number] CSN: [id number]       visited patient during unit rounds  Patient declined spiritual care               Chaplaincy Interventions Utilized:   Empowerment: Provided chaplaincy education     22 1200   Clinical Encounter Type   Visited With Patient   Routine Visit Introduction

## 2022-04-07 NOTE — QUICK NOTE
Patient's daughter, Arsenio Chang, called and updated in regards to patient's current clinical progress and plans of care  Questions answered and concerns addressed       EMILY Mcduffie   PGY-1 Internal Medicine   [facility]

## 2022-04-07 NOTE — RESTORATIVE TECHNICIAN NOTE
Restorative Technician Note      Patient Name: Luis Bledsoe     Restorative Tech Visit Date: 04/07/22  Note Type: Mobility  Patient Position Upon Consult: Supine  Activity Performed: Ambulated  Assistive Device: Other (Comment) (IV pole for stability)  Patient Position at End of Consult: Bedside chair;  All needs within reach    Megan Bonilla  DPT, Restorative Technician

## 2022-04-07 NOTE — RESTORATIVE TECHNICIAN NOTE
Restorative Technician Note      Patient Name: Nishi Escalera     Restorative Tech Visit Date: 04/07/22  Note Type: Mobility  Patient Position Upon Consult: Seated edge of bed  Activity Performed: Ambulated  Assistive Device: Other (Comment) (iv pole for stability)  Patient Position at End of Consult: Supine;  All needs within reach    Jessica Woodard  DPT, Restorative Technician

## 2022-04-07 NOTE — ASSESSMENT & PLAN NOTE
History of newly diagnosed metastatic pancreatic adenocarcinoma w/ mets to liver, completed 3 cycles of chemo (currently on Gemcitabine & Oxaliplatin)  S/p ERCP w/ biliary stent placement 1/20/22  Follows with Dr Jane Smith outpatient  · Continue outpatient follow-up with hem/onc  · PRN's:  · Tylenol 650 mg p o  Q6H   · Oxycodone 5mg/10 mg p o   Q4H   · Dilaudid 0 5 mg IV Q4H   · Zofran 4 mg IV Q6H   · Reglan 10 mg IV Q6H

## 2022-04-07 NOTE — PLAN OF CARE
Problem: PAIN - ADULT  Goal: Verbalizes/displays adequate comfort level or baseline comfort level  Description: Interventions:  - Encourage patient to monitor pain and request assistance  - Assess pain using appropriate pain scale  - Administer analgesics based on type and severity of pain and evaluate response  - Implement non-pharmacological measures as appropriate and evaluate response  - Consider cultural and social influences on pain and pain management  - Notify physician/advanced practitioner if interventions unsuccessful or patient reports new pain  Outcome: Progressing     Problem: GASTROINTESTINAL - ADULT  Goal: Minimal or absence of nausea and/or vomiting  Description: INTERVENTIONS:  - Administer IV fluids if ordered to ensure adequate hydration  - Maintain NPO status until nausea and vomiting are resolved  - Nasogastric tube if ordered  - Administer ordered antiemetic medications as needed  - Provide nonpharmacologic comfort measures as appropriate  - Advance diet as tolerated, if ordered  - Consider nutrition services referral to assist patient with adequate nutrition and appropriate food choices  Outcome: Progressing  Goal: Maintains or returns to baseline bowel function  Description: INTERVENTIONS:  - Assess bowel function  - Encourage oral fluids to ensure adequate hydration  - Administer IV fluids if ordered to ensure adequate hydration  - Administer ordered medications as needed  - Encourage mobilization and activity  - Consider nutritional services referral to assist patient with adequate nutrition and appropriate food choices  Outcome: Progressing  Goal: Maintains adequate nutritional intake  Description: INTERVENTIONS:  - Monitor percentage of each meal consumed  - Identify factors contributing to decreased intake, treat as appropriate  - Assist with meals as needed  - Monitor I&O, weight, and lab values if indicated  - Obtain nutrition services referral as needed  Outcome: Progressing Problem: Nutrition/Hydration-ADULT  Goal: Nutrient/Hydration intake appropriate for improving, restoring or maintaining nutritional needs  Description: Monitor and assess patient's nutrition/hydration status for malnutrition  Collaborate with interdisciplinary team and initiate plan and interventions as ordered  Monitor patient's weight and dietary intake as ordered or per policy  Utilize nutrition screening tool and intervene as necessary  Determine patient's food preferences and provide high-protein, high-caloric foods as appropriate       INTERVENTIONS:  - Monitor oral intake, urinary output, labs, and treatment plans  - Assess nutrition and hydration status and recommend course of action  - Evaluate amount of meals eaten  - Assist patient with eating if necessary   - Allow adequate time for meals  - Recommend/ encourage appropriate diets, oral nutritional supplements, and vitamin/mineral supplements  - Order, calculate, and assess calorie counts as needed  - Recommend, monitor, and adjust tube feedings and TPN/PPN based on assessed needs  - Assess need for intravenous fluids  - Provide specific nutrition/hydration education as appropriate  - Include patient/family/caregiver in decisions related to nutrition  Outcome: Progressing     Problem: Potential for Falls  Goal: Patient will remain free of falls  Description: INTERVENTIONS:  - Educate patient/family on patient safety including physical limitations  - Instruct patient to call for assistance with activity   - Consult OT/PT to assist with strengthening/mobility   - Keep Call bell within reach  - Keep bed low and locked with side rails adjusted as appropriate  - Keep care items and personal belongings within reach  - Initiate and maintain comfort rounds  - Make Fall Risk Sign visible to staff  - Offer Toileting every 2 Hours, in advance of need  - Initiate/Maintain alarm  - Obtain necessary fall risk management equipment  - Apply yellow socks and bracelet for high fall risk patients  - Consider moving patient to room near nurses station  Outcome: Progressing

## 2022-04-08 NOTE — ASSESSMENT & PLAN NOTE
History of newly diagnosed metastatic pancreatic adenocarcinoma w/ mets to liver, completed 3 cycles of chemo (currently on Gemcitabine & Oxaliplatin)  S/p ERCP w/ biliary stent placement 1/20/22  Follows with Dr Jacinta Hsu outpatient  · Continue outpatient follow-up with hem/onc  · PRN's:  · Tylenol 650 mg p o  Q6H   · Oxycodone 5mg/10 mg p o   Q4H   · Dilaudid 0 5 mg IV Q4H   · Zofran 4 mg IV Q6H   · Reglan 10 mg IV Q6H

## 2022-04-08 NOTE — ASSESSMENT & PLAN NOTE
66 y/o male with a history of newly diagnosed metastatic pancreatic adenocarcinoma w/ mets to liver, completed 3 cycles of chemo & currently on Gemcitabine & Oxaliplatin  Recent EGD 3/25 showed a stricture of 1st part of duodenum  Was scheduled for EGD 4/15 with duodenal stent placement for stricture, however now presents with intent to expedite procedure d/t worsening inability to tolerate p o  · GI consulted  · 4/6: s/p EGD with duodenal stent placement  · Upper GI series to assess stent  · Continue clear liquid diet  · Plan to advance diet later today or tomorrow  · Zofran p r n

## 2022-04-08 NOTE — PLAN OF CARE
Problem: PAIN - ADULT  Goal: Verbalizes/displays adequate comfort level or baseline comfort level  Description: Interventions:  - Encourage patient to monitor pain and request assistance  - Assess pain using appropriate pain scale  - Administer analgesics based on type and severity of pain and evaluate response  - Implement non-pharmacological measures as appropriate and evaluate response  - Consider cultural and social influences on pain and pain management  - Notify physician/advanced practitioner if interventions unsuccessful or patient reports new pain  Outcome: Progressing     Problem: GASTROINTESTINAL - ADULT  Goal: Minimal or absence of nausea and/or vomiting  Description: INTERVENTIONS:  - Administer IV fluids if ordered to ensure adequate hydration  - Maintain NPO status until nausea and vomiting are resolved  - Nasogastric tube if ordered  - Administer ordered antiemetic medications as needed  - Provide nonpharmacologic comfort measures as appropriate  - Advance diet as tolerated, if ordered  - Consider nutrition services referral to assist patient with adequate nutrition and appropriate food choices  Outcome: Progressing  Goal: Maintains or returns to baseline bowel function  Description: INTERVENTIONS:  - Assess bowel function  - Encourage oral fluids to ensure adequate hydration  - Administer IV fluids if ordered to ensure adequate hydration  - Administer ordered medications as needed  - Encourage mobilization and activity  - Consider nutritional services referral to assist patient with adequate nutrition and appropriate food choices  Outcome: Progressing  Goal: Maintains adequate nutritional intake  Description: INTERVENTIONS:  - Monitor percentage of each meal consumed  - Identify factors contributing to decreased intake, treat as appropriate  - Assist with meals as needed  - Monitor I&O, weight, and lab values if indicated  - Obtain nutrition services referral as needed  Outcome: Progressing Problem: GENITOURINARY - ADULT  Goal: Maintains or returns to baseline urinary function  Description: INTERVENTIONS:  - Assess urinary function  - Encourage oral fluids to ensure adequate hydration if ordered  - Administer IV fluids as ordered to ensure adequate hydration  - Administer ordered medications as needed  - Offer frequent toileting  - Follow urinary retention protocol if ordered  Outcome: Progressing  Goal: Absence of urinary retention  Description: INTERVENTIONS:  - Assess patients ability to void and empty bladder  - Monitor I/O  - Bladder scan as needed  - Discuss with physician/AP medications to alleviate retention as needed  - Discuss catheterization for long term situations as appropriate  Outcome: Progressing     Problem: METABOLIC, FLUID AND ELECTROLYTES - ADULT  Goal: Fluid balance maintained  Description: INTERVENTIONS:  - Monitor labs   - Monitor I/O and WT  - Instruct patient on fluid and nutrition as appropriate  - Assess for signs & symptoms of volume excess or deficit  Outcome: Progressing     Problem: Nutrition/Hydration-ADULT  Goal: Nutrient/Hydration intake appropriate for improving, restoring or maintaining nutritional needs  Description: Monitor and assess patient's nutrition/hydration status for malnutrition  Collaborate with interdisciplinary team and initiate plan and interventions as ordered  Monitor patient's weight and dietary intake as ordered or per policy  Utilize nutrition screening tool and intervene as necessary  Determine patient's food preferences and provide high-protein, high-caloric foods as appropriate       INTERVENTIONS:  - Monitor oral intake, urinary output, labs, and treatment plans  - Assess nutrition and hydration status and recommend course of action  - Evaluate amount of meals eaten  - Assist patient with eating if necessary   - Allow adequate time for meals  - Recommend/ encourage appropriate diets, oral nutritional supplements, and vitamin/mineral supplements  - Order, calculate, and assess calorie counts as needed  - Recommend, monitor, and adjust tube feedings and TPN/PPN based on assessed needs  - Assess need for intravenous fluids  - Provide specific nutrition/hydration education as appropriate  - Include patient/family/caregiver in decisions related to nutrition  Outcome: Progressing     Problem: Potential for Falls  Goal: Patient will remain free of falls  Description: INTERVENTIONS:  - Educate patient/family on patient safety including physical limitations  - Instruct patient to call for assistance with activity   - Consult OT/PT to assist with strengthening/mobility   - Keep Call bell within reach  - Keep bed low and locked with side rails adjusted as appropriate  - Keep care items and personal belongings within reach  - Initiate and maintain comfort rounds  - Make Fall Risk Sign visible to staff  - Consider moving patient to room near nurses station  Outcome: Progressing

## 2022-04-08 NOTE — PROGRESS NOTES
[facility] - Progress Note  Sarbjit Bishop 67 y o  male MRN: [id number]  Unit/Bed#: [facility] 815-01 Encounter: [id number]      ASSESSMENT & PLAN:    76-year-old male with metastatic pancreatic adenocarcinoma status post ERCP with biliary stent placement on 01/22 and now on chemotherapy, CKD3, and GERD who presented with outlet obstruction from duodenal stricture and sent in from oncology office  GI consultation for same      1  Duodenal stricture, GOO - status post EGD with duodenal stent placement on 4/6  Significant narrowing noted  Placed 9 cm x 22 Fr uncovered metal stent across stricture but with significantly tight even with stent  Stent dilated to 10 mm  Unable to be traversed with standard endoscope despite dilation  Had some abdominal discomfort after lunch yesterday  Clinically feeling improved now  Denies any nausea or vomiting  · Continue clear liquid diet today  · Follow-up UGI series with SB[facility]  · Can consider advancing diet to pureed/soft food in the next day or so depending on clinical course and imaging results  · Advised patient to eat slowly, smaller portions, more frequently due to the area still being fairly narrowed despite stent placement  · Hopeful that stent will open up further over the next few days  · If stent is ineffective, will need to discuss other potential options for intervention including possible endoscopic vs surgical gastrojejunostomy   · Follow clinically  · Upright for all meals and for 30 minutes afterwards  · Antiemetics and supportive care per primary team     2  Pancreatic adenocarcinoma - on neoadjuvant chemotherapy  Recently completed 3rd round of chemotherapy with oxaliplatin and gemcitabine  · Outpatient follow-up with oncology  · GI recommendations otherwise as noted above    ______________________________________________________________________    SUBJECTIVE:     Patient seen and evaluated at bedside   Some abdominal discomfort yesterday after lunch but that has improved  Feeling better this morning       Medication Administration - last 24 hours from 04/07/2022 1121 to 04/08/2022 1121       Date/Time Order Dose Route Action Action by     04/07/2022 2121 mirtazapine (REMERON) tablet 7 5 mg 7 5 mg Oral Given Atif Alton, RN     04/08/2022 0525 pantoprazole (PROTONIX) EC tablet 20 mg 20 mg Oral Given Atif Labpeytonor, RN     04/08/2022 0507 multi-electrolyte (PLASMALYTE-A/ISOLYTE-S PH 7 4) IV solution 75 mL/hr Intravenous [address] [facility], [address] [place] [address]     04/08/2022 0505 multi-electrolyte (PLASMALYTE-A/ISOLYTE-S PH 7 4) IV solution 0 mL/hr Intravenous Stopped Atif Alton, RN     04/08/2022 0229 multi-electrolyte (PLASMALYTE-A/ISOLYTE-S PH 7 4) IV solution 75 mL/hr Intravenous [address] [facility], [address] [place] [address]     04/08/2022 0228 multi-electrolyte (PLASMALYTE-A/ISOLYTE-S PH 7 4) IV solution 0 mL/hr Intravenous Stopped Atifkelton Dang, RN     04/07/2022 1321 multi-electrolyte (PLASMALYTE-A/ISOLYTE-S PH 7 4) IV solution 75 mL/hr Intravenous Nikos Sandhu     04/07/2022 1320 multi-electrolyte (PLASMALYTE-A/ISOLYTE-S PH 7 4) IV solution 0 mL/hr Intravenous Stopped Orlando Sandhu     04/07/2022 2125 ondansetron (ZOFRAN) injection 4 mg 4 mg Intravenous Given Atif Labrador, RN     04/07/2022 1613 ondansetron (ZOFRAN) injection 4 mg   Intravenous Canceled Entry Hitesh Hamming     04/07/2022 1232 ondansetron (ZOFRAN) injection 4 mg 4 mg Intravenous Given Portland Hamming     04/08/2022 1041 enoxaparin (LOVENOX) subcutaneous injection 40 mg 40 mg Subcutaneous Given [facility], RN     04/07/2022 2045 oxyCODONE (ROXICODONE) IR tablet 5 mg 5 mg Oral Given Atif Dang, RN     04/08/2022 1042 fluticasone (FLONASE) 50 mcg/act nasal spray 1 spray 1 spray Each Nare Given [facility], RN     04/08/2022 1040 loratadine (CLARITIN) tablet 10 mg 10 mg Oral Given [facility], RN     04/08/2022 1040 senna-docusate sodium (SENOKOT S) 8 6-50 mg per tablet 1 tablet 1 tablet Oral Given Matthew Abrams RN     04/07/2022 2324 metoclopramide (REGLAN) injection 10 mg 10 mg Intravenous Given Vitor Estevez RN     04/07/2022 1613 metoclopramide (REGLAN) injection 10 mg 10 mg Intravenous Given Katy Bryant     04/08/2022 1040 piperacillin-tazobactam (ZOSYN) 3 375 g in sodium chloride 0 9 % 100 mL IVPB 0 g Intravenous Stopped Janett Lacy RN     04/07/2022 1429 piperacillin-tazobactam (ZOSYN) 3 375 g in sodium chloride 0 9 % 100 mL IVPB   Intravenous Canceled Entry Katy Bryant     04/07/2022 1241 piperacillin-tazobactam (ZOSYN) 3 375 g in sodium chloride 0 9 % 100 mL IVPB 3 375 g Intravenous New Bag Ann Marie Lama Szoke     04/07/2022 1917 methylnaltrexone (RELISTOR) subcutaneous injection 8 mg 8 mg Subcutaneous Given Vitor Estevez RN     04/08/2022 1040 potassium chloride (K-DUR,KLOR-CON) CR tablet 40 mEq 40 mEq Oral Given Janett Cox RN          OBJECTIVE:     Objective   Blood pressure 118/76, pulse 103, temperature 98 1 °F (36 7 °C), resp  rate 18, height 5' 8" (1 727 m), weight 74 4 kg (164 lb), SpO2 98 %  Body mass index is 24 94 kg/m²      Intake/Output Summary (Last 24 hours) at 4/8/2022 1121  Last data filed at 4/8/2022 0505  Gross per 24 hour   Intake 2022 5 ml   Output 200 ml   Net 1822 5 ml       PHYSICAL EXAM:   General Appearance: Awake and alert, in no acute distress; chronically ill-appearing  Abdomen: Soft, non-tender, non-distended; bowel sounds normal; no masses or no organomegaly    Invasive Devices  Report    Central Venous Catheter Line            Port A Cath 02/10/22 Right Chest 57 days          Peripheral Intravenous Line            Peripheral IV 04/08/22 Right Wrist <1 day                LAB RESULTS:  Admission on 04/04/2022   Component Date Value    WBC 04/04/2022 23 41*    RBC 04/04/2022 2 79*    Hemoglobin 04/04/2022 8 6*    Hematocrit 04/04/2022 27 2*    MCV 04/04/2022 98     MCH 04/04/2022 30 8     MCHC 04/04/2022 31 6     RDW 04/04/2022 19 6*  MPV 04/04/2022 11 9     Platelets 37/55/6909 144*    Sodium 04/04/2022 133*    Potassium 04/04/2022 3 6     Chloride 04/04/2022 94*    CO2 04/04/2022 31     ANION GAP 04/04/2022 8     BUN 04/04/2022 15     Creatinine 04/04/2022 1 01     Glucose 04/04/2022 133     Calcium 04/04/2022 9 7     Corrected Calcium 04/04/2022 11 0*    AST 04/04/2022 66*    ALT 04/04/2022 37     Alkaline Phosphatase 04/04/2022 238*    Total Protein 04/04/2022 7 1     Albumin 04/04/2022 2 4*    Total Bilirubin 04/04/2022 0 77     eGFR 04/04/2022 71     Lipase 04/04/2022 18*    ABO Grouping 04/04/2022 AB     Rh Factor 04/04/2022 Positive     Antibody Screen 04/04/2022 Negative     Specimen Expiration Date 04/04/2022 [id number]     Gram Stain Result 04/04/2022 Gram negative rods*    Blood Culture 04/04/2022 No Growth at 72 hrs       LACTIC ACID 04/04/2022 1 8     Procalcitonin 04/04/2022 0 35*    Protime 04/04/2022 15 1*    INR 04/04/2022 1 24*    PTT 04/04/2022 31     Color, UA 04/04/2022 Yellow     Clarity, UA 04/04/2022 Clear     Specific Gravity, UA 04/04/2022 1 019     pH, UA 04/04/2022 7 0     Leukocytes, UA 04/04/2022 Negative     Nitrite, UA 04/04/2022 Negative     Protein, UA 04/04/2022 30 (1+)*    Glucose, UA 04/04/2022 Negative     Ketones, UA 04/04/2022 Trace*    Urobilinogen, UA 04/04/2022 3 0*    Bilirubin, UA 04/04/2022 Negative     Blood, UA 04/04/2022 Negative     Segmented % 04/04/2022 90*    Bands % 04/04/2022 1     Lymphocytes % 04/04/2022 4*    Monocytes % 04/04/2022 5     Eosinophils, % 04/04/2022 0     Basophils % 04/04/2022 0     Absolute Neutrophils 04/04/2022 21 30*    Lymphocytes Absolute 04/04/2022 0 94     Monocytes Absolute 04/04/2022 1 17     Eosinophils Absolute 04/04/2022 0 00     Basophils Absolute 04/04/2022 0 00     RBC Morphology 04/04/2022 Present     Anisocytosis 04/04/2022 Present     Macrocytes 04/04/2022 Present     Microcytes 04/04/2022 Present  Polychromasia 04/04/2022 Present     Tear Drop Cells 04/04/2022 Present     Platelet Estimate 51/62/4737 Adequate     Artifact 04/04/2022 Present     RBC, UA 04/04/2022 1-2     WBC, UA 04/04/2022 1-2     Epithelial Cells 04/04/2022 Occasional     Bacteria, UA 04/04/2022 None Seen     MUCUS THREADS 04/04/2022 Occasional*    Folate 04/04/2022 8 3     Vitamin B-12 04/04/2022 >6,000*    Sodium 04/05/2022 132*    Potassium 04/05/2022 4 3     Chloride 04/05/2022 99*    CO2 04/05/2022 27     ANION GAP 04/05/2022 6     BUN 04/05/2022 12     Creatinine 04/05/2022 0 75     Glucose 04/05/2022 89     Calcium 04/05/2022 8 6     eGFR 04/05/2022 88     Magnesium 04/05/2022 1 8     Phosphorus 04/05/2022 3 0     WBC 04/05/2022 18 06*    RBC 04/05/2022 2 51*    Hemoglobin 04/05/2022 7 8*    Hematocrit 04/05/2022 24 9*    MCV 04/05/2022 99*    MCH 04/05/2022 31 1     MCHC 04/05/2022 31 3*    RDW 04/05/2022 19 8*    Platelets 36/14/0653 162     MPV 04/05/2022 11 1     WBC 04/06/2022 17 01*    RBC 04/06/2022 2 50*    Hemoglobin 04/06/2022 7 7*    Hematocrit 04/06/2022 25 0*    MCV 04/06/2022 100*    MCH 04/06/2022 30 8     MCHC 04/06/2022 30 8*    RDW 04/06/2022 20 2*    MPV 04/06/2022 11 3     Platelets 06/65/2611 213     Sodium 04/06/2022 135*    Potassium 04/06/2022 3 8     Chloride 04/06/2022 99*    CO2 04/06/2022 32     ANION GAP 04/06/2022 4     BUN 04/06/2022 11     Creatinine 04/06/2022 0 72     Glucose 04/06/2022 107     Calcium 04/06/2022 9 1     Corrected Calcium 04/06/2022 10 7*    AST 04/06/2022 38     ALT 04/06/2022 23     Alkaline Phosphatase 04/06/2022 190*    Total Protein 04/06/2022 6 2*    Albumin 04/06/2022 2 0*    Total Bilirubin 04/06/2022 0 68     eGFR 04/06/2022 89     Segmented % 04/06/2022 75     Bands % 04/06/2022 17*    Lymphocytes % 04/06/2022 0*    Monocytes % 04/06/2022 7     Eosinophils, % 04/06/2022 1     Basophils % 04/06/2022 0     Absolute Neutrophils 04/06/2022 15 65*    Lymphocytes Absolute 04/06/2022 0 00*    Monocytes Absolute 04/06/2022 1 19     Eosinophils Absolute 04/06/2022 0 17     Basophils Absolute 04/06/2022 0 00     RBC Morphology 04/06/2022 Present     Anisocytosis 04/06/2022 Present     Macrocytes 04/06/2022 Present     Poikilocytes 04/06/2022 Present     Polychromasia 04/06/2022 Present     Platelet Estimate 77/61/0975 Adequate     Artifact 04/06/2022 Present     ALL TARGETS 04/04/2022 Not Detected     WBC 04/07/2022 15 53*    RBC 04/07/2022 2 43*    Hemoglobin 04/07/2022 7 4*    Hematocrit 04/07/2022 24 6*    MCV 04/07/2022 101*    MCH 04/07/2022 30 5     MCHC 04/07/2022 30 1*    RDW 04/07/2022 20 2*    MPV 04/07/2022 10 7     Platelets 89/28/8345 269     Sodium 04/07/2022 138     Potassium 04/07/2022 4 0     Chloride 04/07/2022 99*    CO2 04/07/2022 34*    ANION GAP 04/07/2022 5     BUN 04/07/2022 13     Creatinine 04/07/2022 0 68     Glucose 04/07/2022 120     Calcium 04/07/2022 8 8     eGFR 04/07/2022 92     WBC 04/08/2022 16 73*    RBC 04/08/2022 2 34*    Hemoglobin 04/08/2022 7 1*    Hematocrit 04/08/2022 23 4*    MCV 04/08/2022 100*    MCH 04/08/2022 30 3     MCHC 04/08/2022 30 3*    RDW 04/08/2022 20 5*    MPV 04/08/2022 10 6     Platelets 98/44/2514 320     nRBC 04/08/2022 0     Neutrophils Relative 04/08/2022 88*    Immat GRANS % 04/08/2022 2     Lymphocytes Relative 04/08/2022 4*    Monocytes Relative 04/08/2022 6     Eosinophils Relative 04/08/2022 0     Basophils Relative 04/08/2022 0     Neutrophils Absolute 04/08/2022 14 65*    Immature Grans Absolute 04/08/2022 0 35*    Lymphocytes Absolute 04/08/2022 0 64     Monocytes Absolute 04/08/2022 1 02     Eosinophils Absolute 04/08/2022 0 03     Basophils Absolute 04/08/2022 0 04     Sodium 04/08/2022 136     Potassium 04/08/2022 3 3*    Chloride 04/08/2022 100     CO2 04/08/2022 33*    ANION GAP 04/08/2022 3*    BUN 04/08/2022 14     Creatinine 04/08/2022 0 85     Glucose 04/08/2022 106     Calcium 04/08/2022 8 4     eGFR 04/08/2022 84        RADIOLOGY RESULTS: I have personally reviewed pertinent imaging studies  EMILY Posada  Chief Gastroenterology Fellow  [facility]  Available on [email or web address]

## 2022-04-08 NOTE — PLAN OF CARE
Problem: PAIN - ADULT  Goal: Verbalizes/displays adequate comfort level or baseline comfort level  Description: Interventions:  - Encourage patient to monitor pain and request assistance  - Assess pain using appropriate pain scale  - Administer analgesics based on type and severity of pain and evaluate response  - Implement non-pharmacological measures as appropriate and evaluate response  - Consider cultural and social influences on pain and pain management  - Notify physician/advanced practitioner if interventions unsuccessful or patient reports new pain  Outcome: Progressing     Problem: GASTROINTESTINAL - ADULT  Goal: Minimal or absence of nausea and/or vomiting  Description: INTERVENTIONS:  - Administer IV fluids if ordered to ensure adequate hydration  - Maintain NPO status until nausea and vomiting are resolved  - Nasogastric tube if ordered  - Administer ordered antiemetic medications as needed  - Provide nonpharmacologic comfort measures as appropriate  - Advance diet as tolerated, if ordered  - Consider nutrition services referral to assist patient with adequate nutrition and appropriate food choices  Outcome: Progressing

## 2022-04-08 NOTE — PROGRESS NOTES
[address] [facility]  Progress Note Solange Adames 1945, 67 y o  male MRN: [id number]  Unit/Bed#: [facility] 815-01 Encounter: [id number]  Primary Care Provider: Lorraine Hill DO   Date and time admitted to hospital: 4/4/2022 10:56 AM    * Duodenal stricture  Assessment & Plan  68 y/o male with a history of newly diagnosed metastatic pancreatic adenocarcinoma w/ mets to liver, completed 3 cycles of chemo & currently on Gemcitabine & Oxaliplatin  Recent EGD 3/25 showed a stricture of 1st part of duodenum  Was scheduled for EGD 4/15 with duodenal stent placement for stricture, however now presents with intent to expedite procedure d/t worsening inability to tolerate p o  · GI consulted  · 4/6: s/p EGD with duodenal stent placement  · Upper GI series to assess stent  · Continue clear liquid diet  · Plan to advance diet later today or tomorrow  · Zofran p r n  Malignant neoplasm of head of pancreas [facility])  Assessment & Plan  History of newly diagnosed metastatic pancreatic adenocarcinoma w/ mets to liver, completed 3 cycles of chemo (currently on Gemcitabine & Oxaliplatin)  S/p ERCP w/ biliary stent placement 1/20/22  Follows with Dr Chelsie Hill outpatient  · Continue outpatient follow-up with hem/onc  · PRN's:  · Tylenol 650 mg p o  Q6H   · Oxycodone 5mg/10 mg p o  Q4H   · Dilaudid 0 5 mg IV Q4H   · Zofran 4 mg IV Q6H   · Reglan 10 mg IV Q6H     Leukocytosis  Assessment & Plan  Leukocytosis on admission with WBC 23  No clear source of infection  UA negative  Suspect secondary to Neulasta administerd w/ last dose of chemo      · Monitor off antibiotics   · Follow-up blood cultures  · Trend WBC and temperature     Anemia  Assessment & Plan  Likely secondary to malignancy/chronic blood loss  No source of bleeding was identified on last EGD, however it was not an ideal study due to retained foods and blood clot  Currently no signs of active bleeding       · Scheduled for EGD  · Monitor hemoglobin   · Transfuse for hemoglobin < 7    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 [facility])  Assessment & Plan  S/p ERCP with biliary stent placement 1/20/22  Bilirubin now normalized  · Continue to monitor   · Management of pancreatic cancer as above     Severe protein-calorie malnutrition ([address] )  Assessment & Plan  Malnutrition Findings:   Adult Malnutrition type: Chronic illness  Adult Degree of Malnutrition: Other severe protein calorie malnutrition  Malnutrition Characteristics: Inadequate energy,Weight loss                  360 Statement: Related to metastatic pancreatic cancer as evidenced by 20% weight loss over the past 4 months and <75% energy intake needs met >1 month treated with pending diet advancement    BMI Findings: Body mass index is 24 94 kg/m²  · Secondary to chronic illness - evidenced by 20% weight loss over the past 4 months and <75% energy intake needs met >1 month  · Nutrition consult         GERD (gastroesophageal reflux disease)  Assessment & Plan  Stable  · Continue protonix 20 mg daily        VTE Pharmacologic Prophylaxis:   Pharmacologic: Enoxaparin (Lovenox)  Mechanical VTE Prophylaxis in Place: Yes    Patient Centered Rounds: I have performed bedside rounds with nursing staff today  Time Spent for Care: 20 minutes  More than 50% of total time spent on counseling and coordination of care as described above  Current Length of Stay: 4 day(s)    Current Patient Status: Inpatient   Certification Statement: The patient will continue to require additional inpatient hospital stay due to Assessing duodenal stent with upper GI series    Code Status: Level 1 - Full Code      Subjective:   Patient reports improvement nausea again today  Currently completing upper GI series and tolerating barium well  No vomiting or significant abdominal pain  Denies any other complaints at this time  Daughter, Ness Niño, on phone well seen patient    All questions and concerns into this time     Objective:     Vitals:   Temp (24hrs), Av 7 °F (36 5 °C), Min:97 4 °F (36 3 °C), Max:98 1 °F (36 7 °C)    Temp:  [97 4 °F (36 3 °C)-98 1 °F (36 7 °C)] 98 1 °F (36 7 °C)  HR:  [103] 103  Resp:  [18-20] 18  BP: (105-119)/(61-76) 118/76  SpO2:  [98 %] 98 %  Body mass index is 24 94 kg/m²  Input and Output Summary (last 24 hours): Intake/Output Summary (Last 24 hours) at 2022 1034  Last data filed at 2022 0505  Gross per 24 hour   Intake  5 ml   Output 200 ml   Net 1822 5 ml     Physical Exam:   General: Right chest port in place  Comfortable  Awake, alert, and conversant  No acute distress  Eyes: Normal conjunctiva, anicteric  Round symmetric pupils  ENT: Hearing grossly intact  No nasal discharge  Neck: Neck is supple  No masses or thyromegaly  Respiratory: CTAB  Respirations are non-labored  No wheezing  Cardiovascular: RRR  No lower extremity edema  Abdomen:   soft, nontender  Bowel sounds present  No guarding or rigidity    Skin: Warm  No rashes or ulcers  Neuro: A&O x 3  Sensation and CN II-XII grossly normal    Psych: Cooperative  Appropriate mood and affect  Normal judgement      Additional Data:     Labs:    Results from last 7 days   Lab Units 22  0522   WBC Thousand/uL 16 73*   HEMOGLOBIN g/dL 7 1*   HEMATOCRIT % 23 4*   PLATELETS Thousands/uL 320   NEUTROS PCT % 88*   LYMPHS PCT % 4*   MONOS PCT % 6   EOS PCT % 0     Results from last 7 days   Lab Units 22  0522 22  0457 22  0514   POTASSIUM mmol/L 3 3*   < > 3 8   CHLORIDE mmol/L 100   < > 99*   CO2 mmol/L 33*   < > 32   BUN mg/dL 14   < > 11   CREATININE mg/dL 0 85   < > 0 72   CALCIUM mg/dL 8 4   < > 9 1   ALK PHOS U/L  --   --  190*   ALT U/L  --   --  23   AST U/L  --   --  38    < > = values in this interval not displayed  Results from last 7 days   Lab Units 22  1307   INR  1 24*       * I Have Reviewed All Lab Data Listed Above    * Additional Pertinent Lab Tests Reviewed: [address] Admission Reviewed    Imaging:  Reviewed    Recent Cultures (last 7 days):     Results from last 7 days   Lab Units 04/04/22  1339 04/04/22  1308   BLOOD CULTURE  No Growth at 72 hrs   --    GRAM STAIN RESULT   --  Gram negative rods*       Last 24 Hours Medication List:   Current Facility-Administered Medications   Medication Dose Route Frequency Provider Last Rate    acetaminophen  650 mg Oral Q6H PRN Calloway Remedies, MD      enoxaparin  40 mg Subcutaneous Daily Calloway Remedies, MD      fluticasone  1 spray Each Nare Daily Moses Silva DO      HYDROmorphone  0 5 mg Intravenous Q4H PRN Calloway Remedies, MD      loratadine  10 mg Oral Daily Moses Silva DO      metoclopramide  10 mg Intravenous Q6H PRN Sumaya Varma MD      mirtazapine  7 5 mg Oral HS Lazara Remedies, MD      multi-electrolyte  75 mL/hr Intravenous Continuous Calloway Remedies, MD 75 mL/hr (04/08/22 0507)    ondansetron  4 mg Intravenous Q6H PRN Lazara Remedies, MD      oxyCODONE  10 mg Oral Q4H PRN Lazara Remedies, MD      oxyCODONE  5 mg Oral Q4H PRN Calloway Remedies, MD      pantoprazole  20 mg Oral Early Morning Lazara Remedies, MD      potassium chloride  40 mEq Oral Once Kenya Emery DO      senna-docusate sodium  1 tablet Oral HS Isamar Hoffmann DO          Today, Patient Was Seen By: Kenya Emery DO    ** Please Note: Dragon 360 Dictation voice to text software may have been used in the creation of this document   **

## 2022-04-09 NOTE — ASSESSMENT & PLAN NOTE
History of newly diagnosed metastatic pancreatic adenocarcinoma w/ mets to liver, completed 3 cycles of chemo (currently on Gemcitabine & Oxaliplatin)  S/p ERCP w/ biliary stent placement 1/20/22  Follows with Dr Krish Li outpatient  · Continue outpatient follow-up with hem/onc  · PRN's:  · Tylenol 650 mg p o  Q6H   · Oxycodone 5mg/10 mg p o   Q4H   · Dilaudid 0 5 mg IV Q4H   · Zofran 4 mg IV Q6H   · Reglan 10 mg IV Q6H

## 2022-04-09 NOTE — PROGRESS NOTES
[address] [facility]  Progress Note Fabienne Moralez 1945, 67 y o  male MRN: [id number]  Unit/Bed#: [facility] 815-01 Encounter: [id number]  Primary Care Provider: Stephanie Portillo DO   Date and time admitted to hospital: 4/4/2022 10:56 AM    * Duodenal stricture  Assessment & Plan  68 y/o male with a history of newly diagnosed metastatic pancreatic adenocarcinoma w/ mets to liver, completed 3 cycles of chemo & currently on Gemcitabine & Oxaliplatin  Recent EGD 3/25 showed a stricture of 1st part of duodenum  Was scheduled for EGD 4/15 with duodenal stent placement for stricture, however now presents with intent to expedite procedure d/t worsening inability to tolerate p o  · GI consulted  · 4/6: s/p EGD with duodenal stent placement  Upper GI series completed 4/8: "Proximal duodenal stent and opacified lumen are narrowed compared to the distal aspect as described  Thin barium contrast traverses the duodenal stent with antegrade progression to small and large bowel without evidence for holdup "  · Currently tolerating clear liquid diet with minimal nausea, advance to full liquid diet today 4/9  · Zofran p r n  Malignant neoplasm of head of pancreas [facility])  Assessment & Plan  History of newly diagnosed metastatic pancreatic adenocarcinoma w/ mets to liver, completed 3 cycles of chemo (currently on Gemcitabine & Oxaliplatin)  S/p ERCP w/ biliary stent placement 1/20/22  Follows with Dr Roland Sloan outpatient  · Continue outpatient follow-up with hem/onc  · PRN's:  · Tylenol 650 mg p o  Q6H   · Oxycodone 5mg/10 mg p o  Q4H   · Dilaudid 0 5 mg IV Q4H   · Zofran 4 mg IV Q6H   · Reglan 10 mg IV Q6H     Leukocytosis  Assessment & Plan  Leukocytosis on admission with WBC 23  No clear source of infection  UA negative    Suspect secondary to Neulasta administerd w/ last dose of chemo      · Monitor off antibiotics   · Follow-up blood cultures  · Trend WBC and temperature     Anemia  Assessment & Plan  Likely secondary to malignancy/chronic blood loss  No source of bleeding was identified on last EGD, however it was not an ideal study due to retained foods and blood clot  Currently no signs of active bleeding  · Scheduled for EGD  · Monitor hemoglobin   · Transfuse for hemoglobin < 7    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 [facility])  Assessment & Plan  S/p ERCP with biliary stent placement 1/20/22  Bilirubin now normalized  · Continue to monitor   · Management of pancreatic cancer as above     Severe protein-calorie malnutrition ([address] )  Assessment & Plan  Malnutrition Findings:   Adult Malnutrition type: Chronic illness  Adult Degree of Malnutrition: Other severe protein calorie malnutrition  Malnutrition Characteristics: Inadequate energy,Weight loss                  360 Statement: Related to metastatic pancreatic cancer as evidenced by 20% weight loss over the past 4 months and <75% energy intake needs met >1 month treated with pending diet advancement    BMI Findings: Body mass index is 24 94 kg/m²  · Secondary to chronic illness - evidenced by 20% weight loss over the past 4 months and <75% energy intake needs met >1 month  · Nutrition consult         GERD (gastroesophageal reflux disease)  Assessment & Plan  Stable  · Continue protonix 20 mg daily        VTE Pharmacologic Prophylaxis:   Pharmacologic: Enoxaparin (Lovenox)  Mechanical VTE Prophylaxis in Place: Yes    Patient Centered Rounds: I have performed bedside rounds with nursing staff today  Time Spent for Care: 20 minutes  More than 50% of total time spent on counseling and coordination of care as described above      Current Length of Stay: 5 day(s)    Current Patient Status: Inpatient   Certification Statement: The patient will continue to require additional inpatient hospital stay due to Gastric outlet obstruction    Code Status: Level 1 - Full Code      Subjective:   Patient reports that he was able tolerate a liquid diet over the past day  Reports minimal nausea, not at this time  Able to keep down clear liquid diet with no vomiting  Only required p r n  Zofran 1 time last evening  Otherwise denies any fevers, chills, chest pain, shortness of breath, abdominal pain or other complaints at this time  Objective:     Vitals:   Temp (24hrs), Av 7 °F (36 5 °C), Min:97 3 °F (36 3 °C), Max:98 °F (36 7 °C)    Temp:  [97 3 °F (36 3 °C)-98 °F (36 7 °C)] 97 8 °F (36 6 °C)  HR:  [89] 89  Resp:  [17-20] 20  BP: (111-113)/(61-71) 111/71  SpO2:  [97 %] 97 %  Body mass index is 24 94 kg/m²  Input and Output Summary (last 24 hours): Intake/Output Summary (Last 24 hours) at 2022 1030  Last data filed at 2022 2041  Gross per 24 hour   Intake 1287 5 ml   Output --   Net 1287 5 ml       Physical Exam:   General: Right chest port in place  Comfortable  Awake, alert, and conversant  No acute distress  Eyes: Normal conjunctiva, anicteric  Round symmetric pupils  ENT: Hearing grossly intact  No nasal discharge  Neck: Neck is supple  No masses or thyromegaly  Respiratory: CTAB  Respirations are non-labored  No wheezing  Cardiovascular: RRR  No lower extremity edema  Abdomen:  Soft  Bowel sounds present  No guarding or rigidity    Skin: Warm  No rashes or ulcers  Neuro: A&O x 3  Sensation and CN II-XII grossly normal    Psych: Cooperative  Appropriate mood and affect  Normal judgement      Additional Data:     Labs:    Results from last 7 days   Lab Units 22  0516 22  0522 22  0514   WBC Thousand/uL 16 13* 16 73*   < >   HEMOGLOBIN g/dL 7 6* 7 1*   < >   HEMATOCRIT % 25 0* 23 4*   < >   PLATELETS Thousands/uL 337 320   < >   NEUTROS PCT %  --  88*  --    LYMPHS PCT %  --  4*  --    LYMPHO PCT % 3*  --    < >   MONOS PCT %  --  6  --    MONO PCT % 5  --    < >   EOS PCT % 0 0   < >    < > = values in this interval not displayed       Results from last 7 days   Lab Units 22  0516 22  0457 04/06/22  0514   POTASSIUM mmol/L 3 6   < > 3 8   CHLORIDE mmol/L 101   < > 99*   CO2 mmol/L 28   < > 32   BUN mg/dL 12   < > 11   CREATININE mg/dL 0 73   < > 0 72   CALCIUM mg/dL 8 6   < > 9 1   ALK PHOS U/L  --   --  190*   ALT U/L  --   --  23   AST U/L  --   --  38    < > = values in this interval not displayed  Results from last 7 days   Lab Units 04/04/22  1307   INR  1 24*       * I Have Reviewed All Lab Data Listed Above  * Additional Pertinent Lab Tests Reviewed: [address] Admission Reviewed    Imaging:  Reviewed      Recent Cultures (last 7 days):     Results from last 7 days   Lab Units 04/04/22  1339 04/04/22  1308   BLOOD CULTURE  No Growth After 4 Days    --    GRAM STAIN RESULT   --  Gram negative rods*       Last 24 Hours Medication List:   Current Facility-Administered Medications   Medication Dose Route Frequency Provider Last Rate    acetaminophen  650 mg Oral Q6H PRN Edward Niño MD      enoxaparin  40 mg Subcutaneous Daily Edward Niño MD      fluticasone  1 spray Each Nare Daily Moses Silva,       HYDROmorphone  0 5 mg Intravenous Q4H PRN Edward Niño MD      loratadine  10 mg Oral Daily Moseschanda Silva, DO      methylnaltrexone bromide  8 mg Subcutaneous Once Moses Banai, DO      metoclopramide  10 mg Intravenous Q6H PRN Aj Lennon MD      mirtazapine  7 5 mg Oral HS Edward Niño MD      multi-electrolyte  75 mL/hr Intravenous Continuous Edward Niño MD 75 mL/hr (04/08/22 2042)    ondansetron  4 mg Intravenous Q6H PRN Edward Niño MD      oxyCODONE  10 mg Oral Q4H PRN Edward Niño MD      oxyCODONE  5 mg Oral Q4H PRN Edward Niño MD      pantoprazole  20 mg Oral Early Morning Daryl Sil, MD Julieann Frankel Bernerd Raya ON 4/10/2022] senna-docusate sodium  2 tablet Oral HS Mary Hsu DO          Today, Patient Was Seen By: Jg Sdihu DO    ** Please Note: Dragon 360 Dictation voice to text software may have been used in the creation of this document   **

## 2022-04-09 NOTE — PROGRESS NOTES
[facility] - Progress Note  Umm Salgado 67 y o  male MRN: [id number]  Unit/Bed#: [facility] 815-01 Encounter: [id number]      ASSESSMENT & PLAN:    39-year-old male with metastatic pancreatic adenocarcinoma status post ERCP with biliary stent placement on 01/22 and now on chemotherapy, CKD3, and GERD who presented with outlet obstruction from duodenal stricture and sent in from oncology office  GI consultation for same      1  Duodenal stricture, GOO - status post EGD with duodenal stent placement on 4/6  Significant narrowing noted  Placed 9 cm x 22 Fr uncovered metal stent across stricture but with significantly tight even with stent  Stent dilated to 10 mm  Unable to be traversed with standard endoscope despite dilation  Denies any nausea or vomiting  Tolerated clear liquids better yesterday and this morning  · Advance to full liquids today  · Instructed patient to eat slowly, smaller portions  · Follow clinically  · May consider repeating UGI series depending on his clinical progress  · As noted previously, may need to consider possible endoscopic or surgical gastrojejunostomy if symptoms fail to improve  · Antiemetics and supportive care per primary team     2  Pancreatic adenocarcinoma - on neoadjuvant chemotherapy  Recently completed 3rd round of chemotherapy with oxaliplatin and gemcitabine  · Outpatient follow-up with oncology  · GI recommendations otherwise as noted above    ______________________________________________________________________    SUBJECTIVE:     Patient seen and evaluated at bedside  Reports tolerating clear liquids better yesterday  Denies any nausea or vomiting, abdominal pain  Tolerated breakfast this morning as well      Medication Administration - last 24 hours from 04/08/2022 0756 to 04/09/2022 0756       Date/Time Order Dose Route Action Action by     04/08/2022 2229 mirtazapine (REMERON) tablet 7 5 mg 7 5 mg Oral Given Mel Monroy RN     04/09/2022 0615 pantoprazole (PROTONIX) EC tablet 20 mg 20 mg Oral Given Juan Francisco Mays RN     04/08/2022 2042 multi-electrolyte (PLASMALYTE-A/ISOLYTE-S PH 7 4) IV solution 75 mL/hr Intravenous [address] Juan Francisco Mays, MARCO     04/08/2022 2041 multi-electrolyte (PLASMALYTE-A/ISOLYTE-S PH 7 4) IV solution 0 mL/hr Intravenous Stopped Juan Francisco Mays RN     04/08/2022 2229 ondansetron (ZOFRAN) injection 4 mg 4 mg Intravenous Given uJan Francisco Mays RN     04/08/2022 1041 enoxaparin (LOVENOX) subcutaneous injection 40 mg 40 mg Subcutaneous Given Bryant Brumfield RN     04/08/2022 2230 oxyCODONE (ROXICODONE) IR tablet 5 mg 5 mg Oral Given Juan Francisco Mays RN     04/08/2022 1042 fluticasone (FLONASE) 50 mcg/act nasal spray 1 spray 1 spray Each Nare Given Bryant Brumfield RN     04/08/2022 1040 loratadine (CLARITIN) tablet 10 mg 10 mg Oral Given Bryant Brumfield RN     04/08/2022 1040 senna-docusate sodium (SENOKOT S) 8 6-50 mg per tablet 1 tablet 1 tablet Oral Given Bryant Brumfield RN     04/08/2022 1040 piperacillin-tazobactam (ZOSYN) 3 375 g in sodium chloride 0 9 % 100 mL IVPB 0 g Intravenous Stopped Bryant Brumfield RN     04/08/2022 1040 potassium chloride (K-DUR,KLOR-CON) CR tablet 40 mEq 40 mEq Oral Given Bryant Brumfield RN     04/08/2022 0900 barium sulfate (LIQUID E-Z-PAQUE) oral suspension 355 mL 130 mL Oral Given by Other James Pellet     04/08/2022 0900 barium sulfate 98 % oral suspension 135 mL 70 mL Oral Given by Other James Pellet          OBJECTIVE:     Objective   Blood pressure 111/71, pulse 89, temperature 97 8 °F (36 6 °C), resp  rate 20, height 5' 8" (1 727 m), weight 74 4 kg (164 lb), SpO2 97 %  Body mass index is 24 94 kg/m²      Intake/Output Summary (Last 24 hours) at 4/9/2022 0756  Last data filed at 4/8/2022 2041  Gross per 24 hour   Intake 1287 5 ml   Output --   Net 1287 5 ml       PHYSICAL EXAM:   General Appearance: Awake and alert, in no acute distress; chronically ill-appearing  Abdomen: Soft, non-tender, non-distended; bowel sounds normal; no masses or no organomegaly    Invasive Devices  Report    Central Venous Catheter Line            Port A Cath 02/10/22 Right Chest 57 days          Peripheral Intravenous Line            Peripheral IV 04/08/22 Right Wrist 1 day                LAB RESULTS:  No results displayed because visit has over 200 results  RADIOLOGY RESULTS: I have personally reviewed pertinent imaging studies  EMILY Mcintyre  Chief Gastroenterology Fellow  [facility]  Available on [email or web address]

## 2022-04-09 NOTE — ASSESSMENT & PLAN NOTE
66 y/o male with a history of newly diagnosed metastatic pancreatic adenocarcinoma w/ mets to liver, completed 3 cycles of chemo & currently on Gemcitabine & Oxaliplatin  Recent EGD 3/25 showed a stricture of 1st part of duodenum  Was scheduled for EGD 4/15 with duodenal stent placement for stricture, however now presents with intent to expedite procedure d/t worsening inability to tolerate p o  · GI consulted  · 4/6: s/p EGD with duodenal stent placement  Upper GI series completed 4/8: "Proximal duodenal stent and opacified lumen are narrowed compared to the distal aspect as described  Thin barium contrast traverses the duodenal stent with antegrade progression to small and large bowel without evidence for holdup "  · Currently tolerating clear liquid diet with minimal nausea, advance to full liquid diet today 4/9  · Zofran p r n

## 2022-04-09 NOTE — PLAN OF CARE
Problem: GASTROINTESTINAL - ADULT  Goal: Minimal or absence of nausea and/or vomiting  Description: INTERVENTIONS:  - Administer IV fluids if ordered to ensure adequate hydration  - Maintain NPO status until nausea and vomiting are resolved  - Nasogastric tube if ordered  - Administer ordered antiemetic medications as needed  - Provide nonpharmacologic comfort measures as appropriate  - Advance diet as tolerated, if ordered  - Consider nutrition services referral to assist patient with adequate nutrition and appropriate food choices  Outcome: Progressing     Problem: GASTROINTESTINAL - ADULT  Goal: Maintains or returns to baseline bowel function  Description: INTERVENTIONS:  - Assess bowel function  - Encourage oral fluids to ensure adequate hydration  - Administer IV fluids if ordered to ensure adequate hydration  - Administer ordered medications as needed  - Encourage mobilization and activity  - Consider nutritional services referral to assist patient with adequate nutrition and appropriate food choices  Outcome: Progressing     Problem: GASTROINTESTINAL - ADULT  Goal: Maintains adequate nutritional intake  Description: INTERVENTIONS:  - Monitor percentage of each meal consumed  - Identify factors contributing to decreased intake, treat as appropriate  - Assist with meals as needed  - Monitor I&O, weight, and lab values if indicated  - Obtain nutrition services referral as needed  Outcome: Progressing     Problem: GENITOURINARY - ADULT  Goal: Maintains or returns to baseline urinary function  Description: INTERVENTIONS:  - Assess urinary function  - Encourage oral fluids to ensure adequate hydration if ordered  - Administer IV fluids as ordered to ensure adequate hydration  - Administer ordered medications as needed  - Offer frequent toileting  - Follow urinary retention protocol if ordered  Outcome: Progressing

## 2022-04-10 NOTE — PROGRESS NOTES
[facility] - Progress Note  Brianda Michelle 67 y o  male MRN: [id number]  Unit/Bed#: [facility] 815-01 Encounter: [id number]      ASSESSMENT & PLAN:    14-year-old male with metastatic pancreatic adenocarcinoma status post ERCP with biliary stent placement on 01/22 and now on chemotherapy, CKD3, and GERD who presented with outlet obstruction from duodenal stricture and sent in from oncology office  GI consultation for same      1  Duodenal stricture, GOO - status post EGD with duodenal stent placement on 4/6  Significant narrowing noted  Placed 9 cm x 22 Fr uncovered metal stent across stricture but with significantly tight even with stent  Stent dilated to 10 mm  Unable to be traversed with standard endoscope despite dilation  Some nausea yesterday and abdominal pain overnight  · Trial pureed/soft foods today  · Follow clinically  · Pain control, antiemetics, and supportive care per primary team  · If fails to improve, may need to consider possible endoscopic or surgical gastrojejunostomy     2  Pancreatic adenocarcinoma - on neoadjuvant chemotherapy  Recently completed 3rd round of chemotherapy with oxaliplatin and gemcitabine  · Recommend oncology consult per patient request; will inform primary team  · GI recommendations otherwise as noted above     ______________________________________________________________________    SUBJECTIVE:     Patient seen and evaluated at bedside  Reports having some nausea and abdominal pain last night  Nausea improved after antiemetics  Pain also improved after narcotics  Tolerated diet although with symptoms as noted        Medication Administration - last 24 hours from 04/09/2022 0746 to 04/10/2022 0746       Date/Time Order Dose Route Action Action by     04/09/2022 2131 mirtazapine (REMERON) tablet 7 5 mg 7 5 mg Oral Given Anitha Lao RN     04/10/2022 0661 pantoprazole (PROTONIX) EC tablet 20 mg 20 mg Oral Given Anitha Lao RN     04/09/2022 1466 acetaminophen (TYLENOL) tablet 650 mg 650 mg Oral Given Leeroy Wellington RN     04/09/2022 2132 multi-electrolyte (PLASMALYTE-A/ISOLYTE-S PH 7 4) IV solution 75 mL/hr Intravenous New [address] Leeroy Wellington RN     04/09/2022 2131 multi-electrolyte (PLASMALYTE-A/ISOLYTE-S PH 7 4) IV solution 0 mL/hr Intravenous Stopped Leeroy Wellington RN     04/09/2022 1825 ondansetron (ZOFRAN) injection 4 mg 4 mg Intravenous Given Marichuy Deemer     04/09/2022 0914 enoxaparin (LOVENOX) subcutaneous injection 40 mg 40 mg Subcutaneous Given Marichuy Deemer     04/09/2022 2131 oxyCODONE (ROXICODONE) IR tablet 5 mg 5 mg Oral Given Leeroy Wellington RN     04/09/2022 0914 fluticasone (FLONASE) 50 mcg/act nasal spray 1 spray 1 spray Each Nare Given Marichuy Deemer     04/09/2022 0914 loratadine (CLARITIN) tablet 10 mg 10 mg Oral Given Marichuy Deemer     04/09/2022 0914 senna-docusate sodium (SENOKOT S) 8 6-50 mg per tablet 1 tablet 1 tablet Oral Given Marichuy Deemer     04/09/2022 2131 metoclopramide (REGLAN) injection 10 mg 10 mg Intravenous Given Leeroy Wellington RN     04/09/2022 1454 methylnaltrexone (RELISTOR) subcutaneous injection 8 mg 8 mg Subcutaneous Not Given Marichuy Deemer          OBJECTIVE:     Objective   Blood pressure 111/68, pulse 88, temperature 97 9 °F (36 6 °C), resp  rate 15, height 5' 8" (1 727 m), weight 74 4 kg (164 lb), SpO2 96 %  Body mass index is 24 94 kg/m²      Intake/Output Summary (Last 24 hours) at 4/10/2022 0746  Last data filed at 4/9/2022 2131  Gross per 24 hour   Intake 1981 25 ml   Output --   Net 1981 25 ml       PHYSICAL EXAM:   General Appearance: Awake and alert, in no acute distress; chronically ill-appearing  Abdomen: Soft, mild discomfort to palpation, non-distended; bowel sounds normal; no masses or no organomegaly    Invasive Devices  Report    Central Venous Catheter Line            Port A Cath 02/10/22 Right Chest 58 days          Peripheral Intravenous Line            Peripheral IV 04/08/22 Right Wrist 2 days LAB RESULTS:  No results displayed because visit has over 200 results  RADIOLOGY RESULTS: I have personally reviewed pertinent imaging studies  EMILY Love  Chief Gastroenterology Fellow  [facility]  Available on [email or web address]

## 2022-04-10 NOTE — PLAN OF CARE
No new medications today. Problem: PAIN - ADULT  Goal: Verbalizes/displays adequate comfort level or baseline comfort level  Description: Interventions:  - Encourage patient to monitor pain and request assistance  - Assess pain using appropriate pain scale  - Administer analgesics based on type and severity of pain and evaluate response  - Implement non-pharmacological measures as appropriate and evaluate response  - Consider cultural and social influences on pain and pain management  - Notify physician/advanced practitioner if interventions unsuccessful or patient reports new pain  Outcome: Progressing     Problem: GASTROINTESTINAL - ADULT  Goal: Minimal or absence of nausea and/or vomiting  Description: INTERVENTIONS:  - Administer IV fluids if ordered to ensure adequate hydration  - Maintain NPO status until nausea and vomiting are resolved  - Nasogastric tube if ordered  - Administer ordered antiemetic medications as needed  - Provide nonpharmacologic comfort measures as appropriate  - Advance diet as tolerated, if ordered  - Consider nutrition services referral to assist patient with adequate nutrition and appropriate food choices  Outcome: Progressing     Problem: GASTROINTESTINAL - ADULT  Goal: Maintains or returns to baseline bowel function  Description: INTERVENTIONS:  - Assess bowel function  - Encourage oral fluids to ensure adequate hydration  - Administer IV fluids if ordered to ensure adequate hydration  - Administer ordered medications as needed  - Encourage mobilization and activity  - Consider nutritional services referral to assist patient with adequate nutrition and appropriate food choices  Outcome: Progressing     Problem: GASTROINTESTINAL - ADULT  Goal: Maintains adequate nutritional intake  Description: INTERVENTIONS:  - Monitor percentage of each meal consumed  - Identify factors contributing to decreased intake, treat as appropriate  - Assist with meals as needed  - Monitor I&O, weight, and lab values if indicated  - Obtain nutrition services referral as needed  Outcome: Progressing

## 2022-04-10 NOTE — ASSESSMENT & PLAN NOTE
68 y/o male with a history of newly diagnosed metastatic pancreatic adenocarcinoma w/ mets to liver, completed 3 cycles of chemo & currently on Gemcitabine & Oxaliplatin  Recent EGD 3/25 showed a stricture of 1st part of duodenum  Was scheduled for EGD 4/15 with duodenal stent placement for stricture, however now presents with intent to expedite procedure d/t worsening inability to tolerate p o  · GI consulted  · 4/6: s/p EGD with duodenal stent placement  Upper GI series completed 4/8: "Proximal duodenal stent and opacified lumen are narrowed compared to the distal aspect as described  Thin barium contrast traverses the duodenal stent with antegrade progression to small and large bowel without evidence for holdup "    4/10/22: patient with abdominal pain last night after laying down, mild nausea with no emesis resolved with antiemetics  Will continue full liquid diet today as patient tolerated breakfast thus far  Will advance to puree tomorrow if continues to tolerate  4/11/22: patient denies any nausea or vomiting this morning, however has not been fully tolerating puree diet, denies abdominal pain currently  Discussed with GI, will monitor for another 24 hours

## 2022-04-10 NOTE — ASSESSMENT & PLAN NOTE
Leukocytosis on admission with WBC 23  No clear source of infection  UA negative  Suspect secondary to Neulasta administerd w/ last dose of chemo      · Monitor off antibiotics   · Follow-up blood cultures-1/2 positive for actinomyces neuii likely contaminant     · Trend WBC and temperature

## 2022-04-10 NOTE — PROGRESS NOTES
[facility]  Progress Note Griselda Imus 1945, 67 y o  male MRN: [id number]  Unit/Bed#: [facility] 815-01 Encounter: [id number]  Primary Care Provider: Giuseppe Carranza DO   Date and time admitted to hospital: 4/4/2022 10:56 AM    Severe protein-calorie malnutrition ([address] )  Assessment & Plan  Malnutrition Findings:   Adult Malnutrition type: Chronic illness  Adult Degree of Malnutrition: Other severe protein calorie malnutrition  Malnutrition Characteristics: Inadequate energy,Weight loss                  360 Statement: Related to metastatic pancreatic cancer as evidenced by 20% weight loss over the past 4 months and <75% energy intake needs met >1 month treated with pending diet advancement    BMI Findings: Body mass index is 24 94 kg/m²  · Secondary to chronic illness - evidenced by 20% weight loss over the past 4 months and <75% energy intake needs met >1 month  · Nutrition consult         Leukocytosis  Assessment & Plan  Leukocytosis on admission with WBC 23  No clear source of infection  UA negative  Suspect secondary to Neulasta administerd w/ last dose of chemo      · Monitor off antibiotics   · Follow-up blood cultures-1/2 positive for actinomyces neuii likely contaminant  · Trend WBC and temperature    Anemia  Assessment & Plan  Likely secondary to malignancy/chronic blood loss  No source of bleeding was identified on last EGD, however it was not an ideal study due to retained foods and blood clot  Currently no signs of active bleeding  ·   · Monitor hemoglobin   · Transfuse for hemoglobin < 7    Malignant neoplasm of head of pancreas [facility])  Assessment & Plan  History of newly diagnosed metastatic pancreatic adenocarcinoma w/ mets to liver, completed 3 cycles of chemo (currently on Gemcitabine & Oxaliplatin)  S/p ERCP w/ biliary stent placement 1/20/22  Follows with Dr Ashleigh Medina outpatient        · Continue outpatient follow-up with hem/onc  · PRN's:  · Tylenol 650 mg p o  Q6H   · Oxycodone 5mg/10 mg p o  Q4H   · Dilaudid 0 5 mg IV Q4H   · Zofran 4 mg IV Q6H   · Reglan 10 mg IV Q6H     GERD (gastroesophageal reflux disease)  Assessment & Plan  Stable  · Continue protonix 20 mg daily    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 [facility])  Assessment & Plan  S/p ERCP with biliary stent placement 1/20/22  Bilirubin now normalized  · Continue to monitor   · Management of pancreatic cancer as above     * Duodenal stricture  Assessment & Plan  68 y/o male with a history of newly diagnosed metastatic pancreatic adenocarcinoma w/ mets to liver, completed 3 cycles of chemo & currently on Gemcitabine & Oxaliplatin  Recent EGD 3/25 showed a stricture of 1st part of duodenum  Was scheduled for EGD 4/15 with duodenal stent placement for stricture, however now presents with intent to expedite procedure d/t worsening inability to tolerate p o  · GI consulted  · 4/6: s/p EGD with duodenal stent placement  Upper GI series completed 4/8: "Proximal duodenal stent and opacified lumen are narrowed compared to the distal aspect as described  Thin barium contrast traverses the duodenal stent with antegrade progression to small and large bowel without evidence for holdup "    4/10/22: patient with abdominal pain last night after laying down, mild nausea with no emesis resolved with antiemetics  Will continue full liquid diet today as patient tolerated breakfast thus far  Will advance to puree tomorrow if continues to tolerate  patient with positional discomfort, suspect secondary to tumor burden  VTE Pharmacologic Prophylaxis:   High Risk (Score >/= 5) - Pharmacological DVT Prophylaxis Ordered: enoxaparin (Lovenox)  Sequential Compression Devices Ordered  Patient Centered Rounds: I performed bedside rounds with nursing staff today    Discussions with Specialists or Other Care Team Provider: n/a    Education and Discussions with Family / Patient: Updated  (daughter) via phone  Time Spent for Care: 45 minutes  More than 50% of total time spent on counseling and coordination of care as described above  Current Length of Stay: 6 day(s)  Current Patient Status: Inpatient   Certification Statement: The patient will continue to require additional inpatient hospital stay due to advancing diet assessing tolerance, nausea and pain control, heme onc eval  Discharge Plan: Anticipate discharge in 48 hrs to discharge location to be determined pending rehab evaluations  Code Status: Level 1 - Full Code    Subjective:   Patient reports abdominal pain after dinner yesterday evening, which started only once he lay down to bed, stated it appeared positional  He also had some anusea without emesis, the nausea resolved with antiemetics  He reports tolerating breakfast thus far without significant abdominal pain or nausea/vomiting  Objective:     Vitals:   Temp (24hrs), Av 7 °F (36 5 °C), Min:97 4 °F (36 3 °C), Max:98 1 °F (36 7 °C)    Temp:  [97 4 °F (36 3 °C)-98 1 °F (36 7 °C)] 97 9 °F (36 6 °C)  HR:  [88] 88  Resp:  [15-19] 15  BP: ()/(53-75) 111/68  SpO2:  [96 %] 96 %  Body mass index is 24 94 kg/m²  Input and Output Summary (last 24 hours): Intake/Output Summary (Last 24 hours) at 4/10/2022 0957  Last data filed at 2022 2131  Gross per 24 hour   Intake  25 ml   Output --   Net  25 ml       Physical Exam:   Physical Exam  Vitals and nursing note reviewed  Constitutional:       General: He is not in acute distress  Appearance: He is well-developed  He is ill-appearing  He is not toxic-appearing or diaphoretic  HENT:      Head: Normocephalic and atraumatic  Eyes:      General: No scleral icterus  Conjunctiva/sclera: Conjunctivae normal    Cardiovascular:      Rate and Rhythm: Normal rate and regular rhythm  Heart sounds: No murmur heard  No friction rub  No gallop      Pulmonary:      Effort: Pulmonary effort is normal  No respiratory distress  Breath sounds: Normal breath sounds  No stridor  No wheezing, rhonchi or rales  Chest:      Chest wall: No tenderness  Abdominal:      General: There is no distension  Palpations: Abdomen is soft  There is no mass  Tenderness: There is abdominal tenderness  There is no guarding or rebound  Hernia: No hernia is present  Comments: Mild tenderness     Musculoskeletal:         General: Swelling present  No tenderness or signs of injury  Cervical back: Neck supple  Right lower leg: No edema  Left lower leg: No edema  Skin:     General: Skin is warm and dry  Coloration: Skin is not jaundiced or pale  Findings: No bruising, erythema, lesion or rash  Neurological:      Mental Status: He is alert  Additional Data:     Labs:  Results from last 7 days   Lab Units 04/09/22  0516 04/08/22  0522 04/06/22  0514   WBC Thousand/uL 16 13* 16 73*   < >   HEMOGLOBIN g/dL 7 6* 7 1*   < >   HEMATOCRIT % 25 0* 23 4*   < >   PLATELETS Thousands/uL 337 320   < >   BANDS PCT % 4  --    < >   NEUTROS PCT %  --  88*  --    LYMPHS PCT %  --  4*  --    LYMPHO PCT % 3*  --    < >   MONOS PCT %  --  6  --    MONO PCT % 5  --    < >   EOS PCT % 0 0   < >    < > = values in this interval not displayed  Results from last 7 days   Lab Units 04/09/22  0516 04/07/22  0457 04/06/22  0514   SODIUM mmol/L 135*   < > 135*   POTASSIUM mmol/L 3 6   < > 3 8   CHLORIDE mmol/L 101   < > 99*   CO2 mmol/L 28   < > 32   BUN mg/dL 12   < > 11   CREATININE mg/dL 0 73   < > 0 72   ANION GAP mmol/L 6   < > 4   CALCIUM mg/dL 8 6   < > 9 1   ALBUMIN g/dL  --   --  2 0*   TOTAL BILIRUBIN mg/dL  --   --  0 68   ALK PHOS U/L  --   --  190*   ALT U/L  --   --  23   AST U/L  --   --  38   GLUCOSE RANDOM mg/dL 100   < > 107    < > = values in this interval not displayed       Results from last 7 days   Lab Units 04/04/22  1307   INR  1 24*             Results from last 7 days   Lab Units 04/04/22  1307   LACTIC ACID mmol/L 1 8   PROCALCITONIN ng/ml 0 35*       Lines/Drains:  Invasive Devices  Report    Central Venous Catheter Line            Port A Cath 02/10/22 Right Chest 58 days          Peripheral Intravenous Line            Peripheral IV 04/08/22 Right Wrist 2 days                Central Line:  Goal for removal: N/A - Chronic PICC             Imaging: No pertinent imaging reviewed  Recent Cultures (last 7 days):   Results from last 7 days   Lab Units 04/04/22  1339 04/04/22  1308   BLOOD CULTURE  No Growth After 5 Days  Actinomyces neuii*   GRAM STAIN RESULT   --  Gram negative rods*  Gram positive rods*       Last 24 Hours Medication List:   Current Facility-Administered Medications   Medication Dose Route Frequency Provider Last Rate    acetaminophen  650 mg Oral Q6H PRN Jonny Butter, MD      enoxaparin  40 mg Subcutaneous Daily [place] Butter, MD      fluticasone  1 spray Each Nare Daily Moses Silva DO      HYDROmorphone  0 5 mg Intravenous Q4H PRN Jonny Butter, MD      loratadine  10 mg Oral Daily Moses Silva,       methylnaltrexone bromide  8 mg Subcutaneous Once oMses Silva,       metoclopramide  10 mg Intravenous Q6H PRN Apoorva Hind, MD      mirtazapine  7 5 mg Oral HS [place] Butter, MD      ondansetron  4 mg Intravenous Q6H PRN [place] Butter, MD      oxyCODONE  10 mg Oral Q4H PRN [place] Butter, MD      oxyCODONE  5 mg Oral Q4H PRN [place] Butter, MD      pantoprazole  20 mg Oral Early Morning Jonny Butter, MD      senna-docusate sodium  2 tablet Oral HS Moses Silav DO          Today, Patient Was Seen By: Juan Diego Hernandez DO    **Please Note: This note may have been constructed using a voice recognition system  **

## 2022-04-10 NOTE — ASSESSMENT & PLAN NOTE
66 y/o male with a history of newly diagnosed metastatic pancreatic adenocarcinoma w/ mets to liver, completed 3 cycles of chemo & currently on Gemcitabine & Oxaliplatin  Recent EGD 3/25 showed a stricture of 1st part of duodenum  Was scheduled for EGD 4/15 with duodenal stent placement for stricture, however now presents with intent to expedite procedure d/t worsening inability to tolerate p o  · GI consulted  · 4/6: s/p EGD with duodenal stent placement  Upper GI series completed 4/8: "Proximal duodenal stent and opacified lumen are narrowed compared to the distal aspect as described  Thin barium contrast traverses the duodenal stent with antegrade progression to small and large bowel without evidence for holdup "    4/10/22: patient with abdominal pain last night after laying down, mild nausea with no emesis resolved with antiemetics  Will continue full liquid diet today as patient tolerated breakfast thus far  Will advance to puree tomorrow if continues to tolerate

## 2022-04-10 NOTE — ASSESSMENT & PLAN NOTE
Likely secondary to malignancy/chronic blood loss  No source of bleeding was identified on last EGD, however it was not an ideal study due to retained foods and blood clot  Currently no signs of active bleeding       ·   · Monitor hemoglobin   · Transfuse for hemoglobin < 7

## 2022-04-11 NOTE — OCCUPATIONAL THERAPY NOTE
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)    Anemia    Leukocytosis    Severe protein-calorie malnutrition (HCC)    Past Medical History  Past Medical History:   Diagnosis Date    CAD (coronary artery disease)     GERD (gastroesophageal reflux disease)     Hyperlipidemia     Hypertension     Pancreatic cancer ([address] )     Prostate cancer [facility])      Past Surgical History  Past Surgical History:   Procedure Laterality Date    CHOLECYSTECTOMY      CORONARY ANGIOPLASTY WITH STENT PLACEMENT      IR PORT PLACEMENT  2/10/2022           04/11/22 0922   OT Last Visit   OT Visit Date 04/11/22   Note Type   Note type Evaluation   Restrictions/Precautions   Weight Bearing Precautions Per Order No   Other Precautions Pain; Fall Risk   Pain Assessment   Pain Assessment Tool 0-10   Pain Score No Pain   Home Living   Type of [address] [place] [address] One level;Ramped entrance  (ramp to enter v  2 IVY)   Bathroom Shower/Tub Walk-in shower   Bathroom Toilet Raised   Bathroom Equipment Grab bars in shower; Shower chair  (denies use)   [address] [facility]  (denies use)   Additional Comments Pt resides with spouse in [facility] with ramp to enter  Prior Function   Level of [place] Independent with ADLs and functional mobility; Needs assistance with IADLs   Lives With Spouse   Receives Help From Family;Home health  (24hr caregivers for Pt's spouse, daughter visits daily)   ADL Assistance Independent   IADLs Needs assistance   Falls in the last 6 months 0   Vocational Retired   Comments PTA, Pt I with ADLs and functional mobility with no AD  Pt's spouse in wheelchair bound and with 24/7 caregivers who assist his spouse with ADLs and IADL tasks   Pt with supportive daughter who visits daily and provides Pt with IADLs support and provides transportation  Pt with supportive daughter able to assist upon DC  Lifestyle   Autonomy I with ADLs and functional mobility with no AD   Reciprocal Relationships Supportive christie   Service to Others Retired   [address] ([place]) [place]   ADL   Where Assessed Chair   Eating Assistance 7  Independent   Eating Deficit Setup  (Pt states he has not been able to eat much)   Grooming Assistance 5  Supervision/Setup   UB Bathing Assistance 7  Independent   LB [address] 5  [address] 5  Supervision/Setup   LB Dressing Deficit Don/doff R sock; Don/doff L sock; Supervision/safety; Increased time to complete;Setup   Toileting Assistance  5  Supervision/Setup   Functional Assistance 5  Supervision/Setup   Additional Comments Pt declined toileting/grooming at this time  Bed Mobility   Supine to Sit Unable to assess   Sit to Supine Unable to assess   Additional Comments Pt greeted supine in bed and left OOB in recliner chair at end of session  All needs met and call bell nearby  Transfers   Sit to Stand 5  Supervision   Stand to Sit 5  Supervision   Functional Mobility   Functional Mobility 5  Supervision   Additional Comments no AD  Household distances  Balance   Static Sitting Good   Dynamic Sitting Fair +   Static Standing Fair +   Dynamic Standing Fair   Ambulatory Fair   Activity Tolerance   Activity Tolerance Patient limited by fatigue   Medical Staff Made Aware Co-eval with RUFUS Worthy 2* to Pt's medical complexity and decreased endurance  Nurse Made Aware RN cleared/updatedDominique MARCO Suárez     RUE Assessment   RUE Assessment WFL   LUE Assessment   LUE Assessment WFL   Hand Function   Gross Motor Coordination Functional   Fine Motor Coordination Functional   Sensation   Light Touch No apparent deficits   Vision-Basic Assessment   Current Vision Wears glasses all the time   Vision - Complex Assessment   Acuity Able to read employee name badge without difficulty; Able to read clock/calendar on wall without difficulty   Cognition   Overall Cognitive Status WFL   Arousal/Participation Cooperative;Responsive   Attention Attends with cues to redirect   Orientation Level Oriented to person;Oriented to situation;Disoriented to place; Disoriented to time  (grossly to place and able to state year/day of week)   Memory Within functional limits   Following Commands Follows one step commands with increased time or repetition   Comments Pt pleasant and cooperative during OT session  Pt with mild decreased safety awareness and increased time/processing response  Pt to continue with daily checks from daughter to maximize safety and independence at home  Assessment   Limitation Decreased ADL status; Decreased high-level ADLs; Decreased endurance   Prognosis Fair   Assessment Pt is a 68 yo Male who presented to [facility] on 4/4/2022 with abdominal pain and bloating  Pt with diagnosis of duodenal stricture  Pt newly diagnosed with malignant neoplasm of head of pancreas  Pt  has a past medical history of CAD (coronary artery disease), GERD (gastroesophageal reflux disease), Hyperlipidemia, Hypertension, Pancreatic cancer ([facility] ), and Prostate cancer ([facility] )  Pt greeted up in chair for OT evaluation on 4/11/2022  Pt resides with spouse in [facility] with ramp to enter  PTA, Pt I with ADLs and functional mobility with no AD  Pt's spouse in wheelchair bound and with 24/7 caregivers who assist his spouse with ADLs and IADL tasks  Pt with supportive daughter who visits daily and provides Pt with IADLs support and provides transportation  Pt with supportive daughter able to assist upon DC  Pt demonstrating the following occupational deficits: I with UB ADLs, S with LB ADLs, S with functional transfers, and S with functional mobility with no AD   Pt with no further acute OT concerns and to continue participating in ADLs with nursing/restorative staff  Pt recommended to continue daily checks from daughter in order to maximize safety and independence  Pt would benefit from returning home with increased social support (continued daily checks from daughter) upon DC to maximize safety and independence with ADLs and functional tasks of choice  DC skilled OT services  Goals   Patient Goals To rest    Plan   OT Frequency Eval only   Recommendation   OT Discharge Recommendation No rehabilitation needs   OT - OK to Discharge Yes   Additional Comments  The patient's raw score on the AM-PAC Daily Activity inpatient short form is 21, standardized score is 44 27, greater than 39 4  Patients at this level are likely to benefit from discharge to home  Please refer to the recommendation of the Occupational Therapist for safe discharge planning     AM-PAC Daily Activity Inpatient   Lower Body Dressing 3   Bathing 3   Toileting 3   Upper Body Dressing 4   Grooming 4   Eating 4   Daily Activity Raw Score 21   Daily Activity Standardized Score (Calc for Raw Score >=11) 44 27   AM-PAC Applied Cognition Inpatient   Following a Speech/Presentation 3   Understanding Ordinary Conversation 4   Taking Medications 4   Remembering Where Things Are Placed or Put Away 4   Remembering List of 4-5 Errands 4   Taking Care of Complicated Tasks 3   Applied Cognition Raw Score 22   Applied Cognition Standardized Score 47 83       Katalina Dumont MS, OTR/L

## 2022-04-11 NOTE — PROGRESS NOTES
[facility]  Progress Note Fabiana Bloom 1945, 67 y o  male MRN: [id number]  [facility] Encounter: [id number]  Primary Care Provider: Per Harrison DO   Date and time admitted to hospital: 4/4/2022 10:56 AM    Severe protein-calorie malnutrition ([address] )  Assessment & Plan  Malnutrition Findings:   Adult Malnutrition type: Chronic illness  Adult Degree of Malnutrition: Other severe protein calorie malnutrition  Malnutrition Characteristics: Inadequate energy,Weight loss                  360 Statement: Related to metastatic pancreatic cancer as evidenced by 20% weight loss over the past 4 months and <75% energy intake needs met >1 month treated with pending diet advancement    BMI Findings: Body mass index is 24 94 kg/m²  · Secondary to chronic illness - evidenced by 20% weight loss over the past 4 months and <75% energy intake needs met >1 month  · Nutrition consult         Leukocytosis  Assessment & Plan  Leukocytosis on admission with WBC 23  No clear source of infection  UA negative  Suspect secondary to Neulasta administerd w/ last dose of chemo      · Monitor off antibiotics   · Follow-up blood cultures-1/2 positive for actinomyces neuii likely contaminant, repeat blood cultures pending  · Trend WBC and temperature    Anemia  Assessment & Plan  Likely secondary to malignancy/chronic blood loss  No source of bleeding was identified on last EGD, however it was not an ideal study due to retained foods and blood clot  Currently no signs of active bleeding  ·   · Monitor hemoglobin   · Transfuse for hemoglobin < 7    Malignant neoplasm of head of pancreas [facility])  Assessment & Plan  History of newly diagnosed metastatic pancreatic adenocarcinoma w/ mets to liver, completed 3 cycles of chemo (currently on Gemcitabine & Oxaliplatin)  S/p ERCP w/ biliary stent placement 1/20/22  Follows with Dr Artis Velazquez outpatient        · Continue outpatient follow-up with hem/onc  · PRN's:  · Tylenol 650 mg p o  Q6H   · Oxycodone 5mg/10 mg p o  Q4H   · Dilaudid 0 5 mg IV Q4H   · Zofran 4 mg IV Q6H   · Reglan 10 mg IV Q6H     GERD (gastroesophageal reflux disease)  Assessment & Plan  Stable  · Continue protonix 20 mg daily    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 [facility])  Assessment & Plan  S/p ERCP with biliary stent placement 1/20/22  Bilirubin now normalized  · Continue to monitor   · Management of pancreatic cancer as above     * Duodenal stricture  Assessment & Plan  68 y/o male with a history of newly diagnosed metastatic pancreatic adenocarcinoma w/ mets to liver, completed 3 cycles of chemo & currently on Gemcitabine & Oxaliplatin  Recent EGD 3/25 showed a stricture of 1st part of duodenum  Was scheduled for EGD 4/15 with duodenal stent placement for stricture, however now presents with intent to expedite procedure d/t worsening inability to tolerate p o  · GI consulted  · 4/6: s/p EGD with duodenal stent placement  Upper GI series completed 4/8: "Proximal duodenal stent and opacified lumen are narrowed compared to the distal aspect as described  Thin barium contrast traverses the duodenal stent with antegrade progression to small and large bowel without evidence for holdup "    4/10/22: patient with abdominal pain last night after laying down, mild nausea with no emesis resolved with antiemetics  Will continue full liquid diet today as patient tolerated breakfast thus far  Will advance to puree tomorrow if continues to tolerate  4/11/22: patient denies any nausea or vomiting this morning, however has not been fully tolerating puree diet, denies abdominal pain currently  Discussed with GI, will monitor for another 24 hours  VTE Pharmacologic Prophylaxis:   High Risk (Score >/= 5) - Pharmacological DVT Prophylaxis Ordered: enoxaparin (Lovenox)  Sequential Compression Devices Ordered      Patient Centered Rounds: I performed bedside rounds with nursing staff today  Discussions with Specialists or Other Care Team Provider: GI    Education and Discussions with Family / Patient: Updated  (daughter) via phone  Time Spent for Care: 45 minutes  More than 50% of total time spent on counseling and coordination of care as described above  Current Length of Stay: 7 day(s)  Current Patient Status: Inpatient   Certification Statement: The patient will continue to require additional inpatient hospital stay due to pending discharge within 24-48 hours pending tolerance of oral intake  Discharge Plan: Anticipate discharge in 24-48 hrs to home with home services  Code Status: Level 1 - Full Code    Subjective:   Patient denies nausea vomiting or abdominal pain this morning  Was not able to tolerate puree yesterday  denies fever chills nightsweats  Objective:     Vitals:   Temp (24hrs), Av 2 °F (36 8 °C), Min:98 1 °F (36 7 °C), Max:98 4 °F (36 9 °C)    Temp:  [98 1 °F (36 7 °C)-98 4 °F (36 9 °C)] 98 1 °F (36 7 °C)  HR:  [84] 84  Resp:  [15] 15  BP: ()/(54-75) 88/54  SpO2:  [95 %] 95 %  Body mass index is 24 94 kg/m²  Input and Output Summary (last 24 hours):   No intake or output data in the 24 hours ending 22 1257    Physical Exam:   Physical Exam  Vitals and nursing note reviewed  Constitutional:       General: He is not in acute distress  Appearance: He is well-developed  He is ill-appearing  He is not toxic-appearing or diaphoretic  HENT:      Head: Normocephalic and atraumatic  Eyes:      General: No scleral icterus  Conjunctiva/sclera: Conjunctivae normal    Cardiovascular:      Rate and Rhythm: Normal rate and regular rhythm  Heart sounds: No murmur heard  No friction rub  No gallop  Pulmonary:      Effort: Pulmonary effort is normal  No respiratory distress  Breath sounds: Normal breath sounds  No stridor  No wheezing, rhonchi or rales  Chest:      Chest wall: No tenderness     Abdominal: General: There is no distension  Palpations: Abdomen is soft  There is no mass  Tenderness: There is no abdominal tenderness  There is no guarding or rebound  Hernia: No hernia is present  Musculoskeletal:         General: No swelling, tenderness, deformity or signs of injury  Cervical back: Neck supple  Right lower leg: No edema  Left lower leg: No edema  Skin:     General: Skin is warm and dry  Coloration: Skin is not jaundiced or pale  Findings: No bruising, erythema, lesion or rash  Neurological:      Mental Status: He is alert and oriented to person, place, and time  Additional Data:     Labs:  Results from last 7 days   Lab Units 04/11/22  1055 04/09/22  0516 04/09/22  0516   WBC Thousand/uL 17 45*   < > 16 13*   HEMOGLOBIN g/dL 7 9*   < > 7 6*   HEMATOCRIT % 26 6*   < > 25 0*   PLATELETS Thousands/uL 425*   < > 337   BANDS PCT %  --   --  4   NEUTROS PCT % 88*  --   --    LYMPHS PCT % 4*  --   --    LYMPHO PCT %  --   --  3*   MONOS PCT % 6  --   --    MONO PCT %  --   --  5   EOS PCT % 0  --  0    < > = values in this interval not displayed       Results from last 7 days   Lab Units 04/11/22  1055   SODIUM mmol/L 136   POTASSIUM mmol/L 3 7   CHLORIDE mmol/L 101   CO2 mmol/L 32   BUN mg/dL 12   CREATININE mg/dL 0 84   ANION GAP mmol/L 3*   CALCIUM mg/dL 8 6   ALBUMIN g/dL 2 0*   TOTAL BILIRUBIN mg/dL 0 56   ALK PHOS U/L 153*   ALT U/L 16   AST U/L 30   GLUCOSE RANDOM mg/dL 153*     Results from last 7 days   Lab Units 04/04/22  1307   INR  1 24*             Results from last 7 days   Lab Units 04/04/22  1307   LACTIC ACID mmol/L 1 8   PROCALCITONIN ng/ml 0 35*       Lines/Drains:  Invasive Devices  Report    Central Venous Catheter Line            Port A Cath 02/10/22 Right Chest 60 days          Peripheral Intravenous Line            Peripheral IV 04/08/22 Right Wrist 3 days                Central Line:  Goal for removal: N/A - Chronic PICC Imaging: No pertinent imaging reviewed  Recent Cultures (last 7 days):   Results from last 7 days   Lab Units 04/04/22  1339 04/04/22  1308   BLOOD CULTURE  No Growth After 5 Days  Actinomyces neuii*   GRAM STAIN RESULT   --  Gram negative rods*  Gram positive rods*       Last 24 Hours Medication List:   Current Facility-Administered Medications   Medication Dose Route Frequency Provider Last Rate    acetaminophen  650 mg Oral Q6H PRN Ariella Silva MD      enoxaparin  40 mg Subcutaneous Daily Ariella Silva MD      fluticasone  1 spray Each Nare Daily Moses Silva DO      HYDROmorphone  0 5 mg Intravenous Q4H PRN Ariella Silva MD      loratadine  10 mg Oral Daily Moses Silva DO      metoclopramide  10 mg Intravenous Q6H PRN Brendan Ulrich MD      mirtazapine  7 5 mg Oral HS Ariella Silva MD      ondansetron  4 mg Intravenous Q6H PRN Ariella Silva, MD      oxyCODONE  10 mg Oral Q4H PRN Ariella Silva MD      oxyCODONE  5 mg Oral Q4H PRN Ariella Silva, MD      pantoprazole  20 mg Oral Early Morning Ariella Silva MD      senna-docusate sodium  2 tablet Oral HS Moses Silva DO          Today, Patient Was Seen By: Yves Ross DO    **Please Note: This note may have been constructed using a voice recognition system  **

## 2022-04-11 NOTE — PHYSICAL THERAPY NOTE
Physical Therapy Evaluation    Patient's Name: Srinivasan Holt    Admitting Diagnosis  Pancreatic mass [K86 89]  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 ([place] [address] ) [K83 1, C80 1]    Problem List  Patient Active Problem List   Diagnosis    Pain of upper abdomen    Change in bowel habit    Prostate cancer ([place] [address] )    BRCA gene mutation positive in male    Coronary artery disease involving native coronary artery of native heart without angina pectoris    Pancreatic mass    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 ([place] [address] )    Pure hypercholesterolemia    Primary hypertension    Blood loss anemia    Stage 3 chronic kidney disease ([place] [address] )    Ischemic cardiomyopathy    History of PTCA    GERD (gastroesophageal reflux disease)    Palliative care patient    Malignant neoplasm of head of pancreas ([place] [address] )    Chemotherapy induced neutropenia (HCC)    Dehydration    Anemia    Duodenal stricture    Leukocytosis    Severe protein-calorie malnutrition (HCC)       Past Medical History  Past Medical History:   Diagnosis Date    CAD (coronary artery disease)     GERD (gastroesophageal reflux disease)     Hyperlipidemia     Hypertension     Pancreatic cancer ([place] [address] )     Prostate cancer [facility])        Past Surgical History  Past Surgical History:   Procedure Laterality Date    CHOLECYSTECTOMY      CORONARY ANGIOPLASTY WITH STENT PLACEMENT      IR PORT PLACEMENT  2/10/2022        04/11/22 0923   PT Last Visit   PT Visit Date 04/11/22   Note Type   Note type Evaluation   Pain Assessment   Pain Assessment Tool 0-10   Pain Score No Pain   Restrictions/Precautions   Weight Bearing Precautions Per Order No   Other Precautions Fall Risk;Pain   Home Living   Type of [address] [place] [address] One level;Ramped entrance   [address] [facility]   Prior Function   Level of [place] Independent with ADLs and functional mobility   Lives With Spouse  (spouse bedbound, has 24H caregivers)   Receives Help From Family  (daughter visits daily)   ADL Assistance Independent   IADLs Needs assistance   Falls in the last 6 months 0   Vocational Retired   Comments Pt reports no AD PTA, caregivers/daughter assist with IADL's and home tasks  General   Family/Caregiver Present No   Cognition   Overall Cognitive Status WFL   Attention Attends with cues to redirect   Orientation Level Oriented to person;Oriented to place;Oriented to situation;Disoriented to time   Following Commands Follows one step commands with increased time or repetition   Comments Pt pleasant and cooperative   RLE Assessment   RLE Assessment WFL  (Grossly 4/5)   LLE Assessment   LLE Assessment WFL  (Grossly 4/5)   Light Touch   RLE Light Touch Grossly intact  (B/L pitting edema)   LLE Light Touch Grossly intact   Bed Mobility   Additional Comments Pt OOB in chair upon PT arrival   Transfers   Sit to Stand 5  Supervision   Stand to Sit 5  Supervision   Additional Comments no AD   Ambulation/Elevation   Gait pattern Improper Weight shift;Decreased foot clearance  (slight lateral sway, mild RIDER)   Gait Assistance 5  Supervision   Assistive Device None   Distance 200 ft   Balance   Static Sitting Good   Dynamic Sitting Fair +   Static Standing Fair +   Dynamic Standing Fair   Ambulatory Fair   Activity Tolerance   Activity Tolerance Patient limited by fatigue   Medical Staff Made Aware Co-evaluation with OT given medical complexity   Nurse Made Aware RN updated   Assessment   Assessment Pt is a 67 y o  male seen for PT evaluation s/p [facility] to [facility] on 4/4/[facility]22  Pt was admitted with a primary dx of: Duodenal stricture s/p EGD with duodenal stent placed 4/6  PT now consulted for assessment of mobility and d/c needs  Pt with Up with assistance orders  Pts current comorbidities effecting treatment include: Pancreatic ca s/p ERCP with biliary stent placement 1/20/22, GERD, Protein-calorie malnutrition   Pts current clinical presentation is Unstable/ Unpredictable (high complexity) due to Ongoing medical management for primary dx, Decreased activity tolerance compared to baseline, Increased assistance needed from caregiver at current time, Trending lab values  Prior to admission, pt was independent without AD  Upon evaluation, pt currently is requiring supervision for transfers and supervision for ambulation 200 ft w/ no AD  Pt with generalized fatigue and weakness related to hospitalization and cancer diagnosis  Pt educated on home safety, importance of maintaining function, pt agreeable to HHPT  Encouraged continued ambulation 3x/day during hospital stay, pt c/o over COVID-19 exposure, masks provided  No further acute PT needs identified  At conclusion of PT session pt returned back in chair with phone and call bell within reach  Pt denies any further questions at this time  Recommend home with family care and HHPT upon hospital D/C     Goals   Patient Goals to go home   Recommendation   PT Discharge Recommendation Home with home health rehabilitation   Darlene Gomez 435   Turning in Bed Without Bedrails 4   Lying on Back to Sitting on Edge of Flat Bed 4   Moving Bed to Chair 4   Standing Up From Chair 4   Walk in Room 3   Climb 3-5 Stairs 3   Basic Mobility Inpatient Raw Score 22   Basic Mobility Standardized Score 47 4   Highest Level Of Mobility   -[facility] Goal 7: Walk 25 feet or more       Lola Imperial, PT, DPT, GCS

## 2022-04-11 NOTE — PROGRESS NOTES
[facility] - Progress Note  Angelo Basta 67 y o  male MRN: [id number]  Unit/Bed#: [facility] 815-01 Encounter: [id number]      ASSESSMENT & PLAN:    71-year-old male with metastatic pancreatic adenocarcinoma status post ERCP with biliary stent placement on 01/22 and now on chemotherapy, CKD3, and GERD who presented with outlet obstruction from duodenal stricture and sent in from oncology office  GI consultation for same      1  Duodenal stricture, GOO - status post EGD with duodenal stent placement on 4/6  Significant narrowing noted  Placed 9 cm x 22 Fr uncovered metal stent across stricture but with significantly tight even with stent  Stent dilated to 10 mm  Unable to be traversed with standard endoscope despite dilation  Some nausea yesterday and abdominal pain overnight  · Continue pureed/soft foods today  · Follow clinically; may need another day or so to assess ability to fully tolerate diet  · Hopefully, as long as patient is able to tolerate his diet today, can potentially be discharged tomorrow  · Pain control, antiemetics, and supportive care per primary team  · If fails to improve, may need to consider possible endoscopic or surgical gastrojejunostomy     2  Pancreatic adenocarcinoma - on neoadjuvant chemotherapy  Recently completed 3rd round of chemotherapy with oxaliplatin and gemcitabine  · Recommend oncology consult per patient request; will inform primary team  · GI recommendations otherwise as noted above    ______________________________________________________________________    SUBJECTIVE:     Patient seen and evaluated at bedside  Abdominal pain and nausea have improved  Denies having any significant nausea overnight  However, did not eat as much yesterday      Medication Administration - last 24 hours from 04/10/2022 1131 to 04/11/2022 1131       Date/Time Order Dose Route Action Action by     04/10/2022 2100 mirtazapine (REMERON) tablet 7 5 mg 7 5 mg Oral Given Anitha Lao, RN     04/11/2022 0539 pantoprazole (PROTONIX) EC tablet 20 mg 20 mg Oral Given Anitha Lao, RN     04/10/2022 2100 ondansetron (ZOFRAN) injection 4 mg 4 mg Intravenous Given Anitha Lao, RN     04/11/2022 0811 enoxaparin (LOVENOX) subcutaneous injection 40 mg 40 mg Subcutaneous Given Nay Face, RN     04/10/2022 2100 oxyCODONE (ROXICODONE) IR tablet 5 mg 5 mg Oral Given Anitha Lao, RN     04/11/2022 0811 fluticasone (FLONASE) 50 mcg/act nasal spray 1 spray 1 spray Each Nare Given Nay Face, RN     04/11/2022 0811 loratadine (CLARITIN) tablet 10 mg 10 mg Oral Given Nay Face, RN     04/11/2022 7612 senna-docusate sodium (SENOKOT S) 8 6-50 mg per tablet 2 tablet 2 tablet Oral Given Nay Face, RN          OBJECTIVE:     Objective   Blood pressure (!) 88/54, pulse 84, temperature 98 1 °F (36 7 °C), resp  rate 15, height 5' 8" (1 727 m), weight 74 4 kg (164 lb), SpO2 95 %  Body mass index is 24 94 kg/m²  No intake or output data in the 24 hours ending 04/11/22 1131    PHYSICAL EXAM:   General Appearance: Awake and alert, in no acute distress; chronically ill-appearing  Abdomen: Soft, non-tender, non-distended; bowel sounds normal; no masses or no organomegaly    Invasive Devices  Report    Central Venous Catheter Line            Port A Cath 02/10/22 Right Chest 60 days          Peripheral Intravenous Line            Peripheral IV 04/08/22 Right Wrist 3 days                LAB RESULTS:  No results displayed because visit has over 200 results  RADIOLOGY RESULTS: I have personally reviewed pertinent imaging studies  EMILY Chu  Chief Gastroenterology Fellow  [facility]  Available on [email or web address]

## 2022-04-11 NOTE — CASE MANAGEMENT
Case Management Discharge Planning Note    Patient name Umm Salgado  Location [facility] 815/[facility] 235-94 MRN [id number]  : 1945 Date 2022       Current Admission Date: 2022  Current Admission Diagnosis:Duodenal stricture   Patient Active Problem List    Diagnosis Date Noted    Severe protein-calorie malnutrition ([facility] ) 2022    Anemia 2022    Duodenal stricture 2022    Leukocytosis 2022    Dehydration 2022    Malignant ne[facility] of head of [facility] ) 2022    Chemotherapy induced neutropenia ([facility] ) 2022    Palliative care patient 2022    Blood loss anemia 2022    Stage 3 chronic kidney disease ([facility] ) 2022    Ischemic cardiomyopathy 2022    History of PTCA 2022    GERD (gastroesophageal reflux disease)     Pancreatic mass 2022    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 ([facility] ) 2022    Pure hypercholesterolemia     Primary hypertension     Pain of upper abdomen 2021    Change in bowel habit 2021    Prostate cancer ([facility] ) 2021    BRCA gene mutation positive in male 2021    Coronary artery disease involving native coronary artery of native heart without angina pectoris 2021      LOS (days): 7  Geometric Mean LOS (GMLOS) (days): 4 90  Days to GMLOS:-2     OBJECTIVE:  Risk of Unplanned Readmission Score: 18         Current admission status: Inpatient   Preferred Pharmacy:   [facility]acy [id number] [address]  Phone: [phone] Fax: [phone]    Primary Care Provider: Giuseppe Carranza DO    Primary Insurance: MEDICARE  Secondary Insurance: BLUE CROSS    DISCHARGE DETAILS:    Discharge planning discussed with[de-identified] patient  Freedom of Choice: Yes     CM contacted family/caregiver?: No- see comments  Were Treatment Team discharge recommendations reviewed with patient/caregiver?: Yes  Did patient/caregiver verbalize understanding of patient care needs?: Yes  Were patient/caregiver advised of the risks associated with not following Treatment Team discharge recommendations?: Yes         [address]         Is the patient interested in [facility] at discharge?: Yes  [address] requested[de-identified] [address] Name[de-identified] [address] [place] [address] Provider[de-identified] PCP  Home Health Services Needed[de-identified] Evaluate Functional Status and Safety,Gait/ADL Training,Strengthening/Theraputic Exercises to Improve Function  Homebound Criteria Met[de-identified] Requires the Assistance of Another Person for Safe Ambulation or to Leave the Home,Uses an Assist Device (i e  cane, walker, etc)  Supporting Clincal Findings[de-identified] Limited Endurance          IMM Given (Date):: 04/11/22  IMM Given to[de-identified] Patient            21  update: Screening for Care at Home  TC to pt's PCP office, Dr Rosalva Mckinnon is [facility]  1149 update: Accepted by NADIR RUFFIN

## 2022-04-11 NOTE — ASSESSMENT & PLAN NOTE
History of newly diagnosed metastatic pancreatic adenocarcinoma w/ mets to liver, completed 3 cycles of chemo (currently on Gemcitabine & Oxaliplatin)  S/p ERCP w/ biliary stent placement 1/20/22  Follows with Dr Michelle Meredith outpatient  · Continue outpatient follow-up with hem/onc  · PRN's:  · Tylenol 650 mg p o  Q6H   · Oxycodone 5mg/10 mg p o   Q4H   · Dilaudid 0 5 mg IV Q4H   · Zofran 4 mg IV Q6H   · Reglan 10 mg IV Q6H

## 2022-04-11 NOTE — ASSESSMENT & PLAN NOTE
Leukocytosis on admission with WBC 23  No clear source of infection  UA negative    Suspect secondary to Neulasta administerd w/ last dose of chemo      · Monitor off antibiotics   · Follow-up blood cultures-1/2 positive for actinomyces neuii likely contaminant, repeat blood cultures pending  · Trend WBC and temperature

## 2022-04-12 NOTE — PROGRESS NOTES
[facility] - Progress Note  Scott Hendrix 67 y o  male MRN: [id number]  Unit/Bed#: [facility] 815-01 Encounter: [id number]      ASSESSMENT & PLAN:    65-year-old male with metastatic pancreatic adenocarcinoma status post ERCP with biliary stent placement on 01/22 and now on chemotherapy, CKD3, and GERD who presented with outlet obstruction from duodenal stricture and sent in from oncology office  GI consultation for same      1  Duodenal stricture, GOO - status post EGD with duodenal stent placement on 4/6  Significant narrowing noted  Placed 9 cm x 22 Fr uncovered metal stent across stricture but with significantly tight even with stent  Stent dilated to 10 mm  Unable to be traversed with standard endoscope despite dilation  Tolerating diet  · Continue with soft diet as tolerated  · Advised patient to stick to primarily liquids and soft/pureed foods that are easier to pass  · Recommend nutrition/protein supplementation 3-4 times daily with meals  · Outpatient follow-up in 1-2 weeks  · Okay for discharge from GI standpoint     2  Pancreatic adenocarcinoma - on neoadjuvant chemotherapy  Recently completed 3rd round of chemotherapy with oxaliplatin and gemcitabine  · Recommend oncology consult per patient request; will inform primary team  · GI recommendations otherwise as noted above    GI will sign off  Please contact the GI fellow on-call with any questions or concerns  ______________________________________________________________________    SUBJECTIVE:     Patient seen and evaluated  Tolerated liquids okay  Eating apple sauce as well  Required enema overnight for bowel movement but feels after having BM  Denies any nausea or vomiting  Denies abdominal pain this morning      Medication Administration - last 24 hours from 04/11/2022 0945 to 04/12/2022 0945       Date/Time Order Dose Route Action Action by     04/11/2022 2100 mirtazapine (REMERON) tablet 7 5 mg 7 5 mg Oral Given Early Teresa Arsen Perez RN     04/12/2022 0615 pantoprazole (PROTONIX) EC tablet 20 mg 20 mg Oral Given Zenobia Briggs RN     04/11/2022 1323 multi-electrolyte (PLASMALYTE-A/ISOLYTE-S PH 7 4) IV solution 0 mL/hr Intravenous Stopped Bettina Haider RN     04/11/2022 1442 ondansetron (ZOFRAN) injection 4 mg 4 mg Intravenous Given Bettina Haider RN     04/12/2022 0812 enoxaparin (LOVENOX) subcutaneous injection 40 mg 40 mg Subcutaneous Given Bridget Baker     04/11/2022 2055 oxyCODONE (ROXICODONE) IR tablet 5 mg 5 mg Oral Given Zenobia Briggs RN     04/12/2022 0815 fluticasone (FLONASE) 50 mcg/act nasal spray 1 spray 1 spray Each Nare Given [facility]     04/12/2022 0812 loratadine (CLARITIN) tablet 10 mg 10 mg Oral [facility]     04/12/2022 3074 senna-docusate sodium (SENOKOT S) 8 6-50 mg per tablet 2 tablet 2 tablet Oral Given Arkerry Baker     04/11/2022 1442 methylnaltrexone (RELISTOR) subcutaneous injection 8 mg 8 mg Subcutaneous Given Bettina Haider RN     04/12/2022 0941 potassium chloride 20 mEq IVPB (premix) 0 mEq Intravenous [facility]     04/12/2022 0924 potassium chloride 20 mEq IVPB (premix) 20 mEq Intravenous Nikos Caballero          OBJECTIVE:     Objective   Blood pressure 113/70, pulse 90, temperature (!) 97 3 °F (36 3 °C), resp  rate 18, height 5' 8" (1 727 m), weight 74 4 kg (164 lb), SpO2 95 %  Body mass index is 24 94 kg/m²    No intake or output data in the 24 hours ending 04/12/22 0945    PHYSICAL EXAM:   General Appearance: Awake and alert, in no acute distress; chronically ill-appearing  Abdomen: Soft, non-tender, non-distended; bowel sounds normal; no masses or no organomegaly    Invasive Devices  Report    Central Venous Catheter Line            Port A Cath 02/10/22 Right Chest 60 days          Peripheral Intravenous Line            Peripheral IV 04/12/22 Right Antecubital <1 day                LAB RESULTS:  No results displayed because visit has over 200 results  RADIOLOGY RESULTS: I have personally reviewed pertinent imaging studies  EMILY Estes  Chief Gastroenterology Fellow  [facility]  Available on [email or web address]

## 2022-04-12 NOTE — ASSESSMENT & PLAN NOTE
History of newly diagnosed metastatic pancreatic adenocarcinoma w/ mets to liver, completed 3 cycles of chemo (currently on Gemcitabine & Oxaliplatin)  S/p ERCP w/ biliary stent placement 1/20/22  Follows with Dr Ros Pierre outpatient  · Continue outpatient follow-up with hem/onc  · PRN's:  · Tylenol 650 mg p o  Q6H   · Oxycodone 5mg/10 mg p o   Q4H   · Dilaudid 0 5 mg IV Q4H   · Zofran 4 mg IV Q6H   · Reglan 10 mg IV Q6H

## 2022-04-12 NOTE — RESTORATIVE TECHNICIAN NOTE
Restorative Technician Note      Patient Name: Angelo Mora     Restorative Tech Visit Date: 04/12/22  Note Type: Mobility  Patient Position Upon Consult: Standing  Activity Performed: Ambulated  Patient Position at End of Consult: Bedside chair;  All needs within reach    Hannah Kaufman  DPT, Restorative Technician

## 2022-04-12 NOTE — ASSESSMENT & PLAN NOTE
66 y/o male with a history of newly diagnosed metastatic pancreatic adenocarcinoma w/ mets to liver, completed 3 cycles of chemo & currently on Gemcitabine & Oxaliplatin  Recent EGD 3/25 showed a stricture of 1st part of duodenum  Was scheduled for EGD 4/15 with duodenal stent placement for stricture, however now presents with intent to expedite procedure d/t worsening inability to tolerate p o  · GI consulted  · 4/6: s/p EGD with duodenal stent placement  Upper GI series completed 4/8: "Proximal duodenal stent and opacified lumen are narrowed compared to the distal aspect as described  Thin barium contrast traverses the duodenal stent with antegrade progression to small and large bowel without evidence for holdup "    4/10/22: patient with abdominal pain last night after laying down, mild nausea with no emesis resolved with antiemetics  Will continue full liquid diet today as patient tolerated breakfast thus far  Will advance to puree tomorrow if continues to tolerate  4/11/22: patient denies any nausea or vomiting this morning, however has not been fully tolerating puree diet, denies abdominal pain currently  Discussed with GI, will monitor for another 24 hours     4/12: pt feels as if he's able to tolerance more intake today but would like to stay an additional 24h for monitoring, will plan for discharge tomorrow

## 2022-04-12 NOTE — PROGRESS NOTES
[address] [facility]  Progress Note Fabienne Moralez 1945, 67 y o  male MRN: [id number]  Unit/Bed#: [facility] 815-01 Encounter: [id number]  Primary Care Provider: Stephanie Portillo DO   Date and time admitted to hospital: 4/4/2022 10:56 AM    * Duodenal stricture  Assessment & Plan  66 y/o male with a history of newly diagnosed metastatic pancreatic adenocarcinoma w/ mets to liver, completed 3 cycles of chemo & currently on Gemcitabine & Oxaliplatin  Recent EGD 3/25 showed a stricture of 1st part of duodenum  Was scheduled for EGD 4/15 with duodenal stent placement for stricture, however now presents with intent to expedite procedure d/t worsening inability to tolerate p o  · GI consulted  · 4/6: s/p EGD with duodenal stent placement  Upper GI series completed 4/8: "Proximal duodenal stent and opacified lumen are narrowed compared to the distal aspect as described  Thin barium contrast traverses the duodenal stent with antegrade progression to small and large bowel without evidence for holdup "    4/10/22: patient with abdominal pain last night after laying down, mild nausea with no emesis resolved with antiemetics  Will continue full liquid diet today as patient tolerated breakfast thus far  Will advance to puree tomorrow if continues to tolerate  4/11/22: patient denies any nausea or vomiting this morning, however has not been fully tolerating puree diet, denies abdominal pain currently  Discussed with GI, will monitor for another 24 hours     4/12: pt feels as if he's able to tolerance more intake today but would like to stay an additional 24h for monitoring, will plan for discharge tomorrow    Severe protein-calorie malnutrition [facility])  Assessment & Plan  Malnutrition Findings:   Adult Malnutrition type: Chronic illness  Adult Degree of Malnutrition: Other severe protein calorie malnutrition  Malnutrition Characteristics: Inadequate energy,Weight loss                  360 Statement: Related to metastatic pancreatic cancer as evidenced by 20% weight loss over the past 4 months and <75% energy intake needs met >1 month treated with pending diet advancement    BMI Findings: Body mass index is 24 94 kg/m²  · Secondary to chronic illness - evidenced by 20% weight loss over the past 4 months and <75% energy intake needs met >1 month  · Nutrition consult         Leukocytosis  Assessment & Plan  Leukocytosis on admission with WBC 23  No clear source of infection  UA negative  Suspect secondary to Neulasta administerd w/ last dose of chemo      · Monitor off antibiotics   · Follow-up blood cultures-1/2 positive for actinomyces neuii likely contaminant, repeat blood cultures pending  · Trend WBC and temperature    Anemia  Assessment & Plan  Likely secondary to malignancy/chronic blood loss  No source of bleeding was identified on last EGD, however it was not an ideal study due to retained foods and blood clot  Currently no signs of active bleeding  ·   · Monitor hemoglobin   · Transfuse for hemoglobin < 7    Malignant neoplasm of head of pancreas [facility])  Assessment & Plan  History of newly diagnosed metastatic pancreatic adenocarcinoma w/ mets to liver, completed 3 cycles of chemo (currently on Gemcitabine & Oxaliplatin)  S/p ERCP w/ biliary stent placement 1/20/22  Follows with Dr Vinh Martinez outpatient  · Continue outpatient follow-up with hem/onc  · PRN's:  · Tylenol 650 mg p o  Q6H   · Oxycodone 5mg/10 mg p o  Q4H   · Dilaudid 0 5 mg IV Q4H   · Zofran 4 mg IV Q6H   · Reglan 10 mg IV Q6H     GERD (gastroesophageal reflux disease)  Assessment & Plan  Stable  · Continue protonix 20 mg daily    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 [facility])  Assessment & Plan  S/p ERCP with biliary stent placement 1/20/22  Bilirubin now normalized       · Continue to monitor   · Management of pancreatic cancer as above           VTE Pharmacologic Prophylaxis:   Moderate Risk (Score 3-4) - Pharmacological DVT Prophylaxis Ordered: enoxaparin (Lovenox)  Patient Centered Rounds: I performed bedside rounds with nursing staff today  Discussions with Specialists or Other Care Team Provider: GI    Education and Discussions with Family / Patient: Updated  (wife) via phone  Time Spent for Care: 20 minutes  More than 50% of total time spent on counseling and coordination of care as described above  Current Length of Stay: 8 day(s)  Current Patient Status: Inpatient   Certification Statement: The patient will continue to require additional inpatient hospital stay due to monitor for tolerance of diet with new duodenal stent  Discharge Plan: Anticipate discharge tomorrow to home  Code Status: Level 1 - Full Code    Subjective:   Reports that his able to tolerance solids and purred, feels as if the results of the duodenal stent as not as expected, but denies nausea, vomiting or abdominal pain  Last BM within 24h, passing flatus    Objective:     Vitals:   Temp (24hrs), Av 6 °F (36 4 °C), Min:97 3 °F (36 3 °C), Max:97 8 °F (36 6 °C)    Temp:  [97 3 °F (36 3 °C)-97 8 °F (36 6 °C)] 97 3 °F (36 3 °C)  HR:  [90] 90  Resp:  [16-18] 18  BP: (104-113)/(60-71) 113/70  SpO2:  [95 %] 95 %  Body mass index is 24 94 kg/m²  Input and Output Summary (last 24 hours):   No intake or output data in the 24 hours ending 22 1306    Physical Exam:   Physical Exam  Constitutional:       Appearance: Normal appearance  HENT:      Head: Normocephalic and atraumatic  Mouth/Throat:      Mouth: Mucous membranes are moist       Pharynx: Oropharynx is clear  Eyes:      Extraocular Movements: Extraocular movements intact  Pulmonary:      Effort: Pulmonary effort is normal    Abdominal:      General: There is no distension  Palpations: Abdomen is soft  There is no mass  Tenderness: There is no abdominal tenderness  There is no guarding or rebound  Hernia: No hernia is present  Musculoskeletal:      Right lower leg: No edema  Left lower leg: No edema  Skin:     General: Skin is warm and dry  Neurological:      General: No focal deficit present  Mental Status: He is alert and oriented to person, place, and time  Psychiatric:         Mood and Affect: Mood normal          Behavior: Behavior normal           Additional Data:     Labs:  Results from last 7 days   Lab Units 04/12/22  0624 04/11/22  1055 04/09/22  0516   WBC Thousand/uL 18 04*   < > 16 13*   HEMOGLOBIN g/dL 8 9*   < > 7 6*   HEMATOCRIT % 29 0*   < > 25 0*   PLATELETS Thousands/uL 546*   < > 337   BANDS PCT %  --   --  4   NEUTROS PCT % 84*   < >  --    LYMPHS PCT % 6*   < >  --    LYMPHO PCT %  --   --  3*   MONOS PCT % 7   < >  --    MONO PCT %  --   --  5   EOS PCT % 0   < > 0    < > = values in this interval not displayed  Results from last 7 days   Lab Units 04/12/22  0624   SODIUM mmol/L 135*   POTASSIUM mmol/L 3 4*   CHLORIDE mmol/L 99*   CO2 mmol/L 31   BUN mg/dL 13   CREATININE mg/dL 1 03   ANION GAP mmol/L 5   CALCIUM mg/dL 8 9   ALBUMIN g/dL 2 2*   TOTAL BILIRUBIN mg/dL 0 47   ALK PHOS U/L 160*   ALT U/L 16   AST U/L 34   GLUCOSE RANDOM mg/dL 116                       Lines/Drains:  Invasive Devices  Report    Central Venous Catheter Line            Port A Cath 02/10/22 Right Chest 61 days          Peripheral Intravenous Line            Peripheral IV 04/12/22 Right Antecubital <1 day                Central Line:  Goal for removal: Will discontinue when meds requiring line are completed  Imaging: No pertinent imaging reviewed  Recent Cultures (last 7 days):   Results from last 7 days   Lab Units 04/11/22  1057   BLOOD CULTURE  No Growth at 24 hrs         Last 24 Hours Medication List:   Current Facility-Administered Medications   Medication Dose Route Frequency Provider Last Rate    acetaminophen  650 mg Oral Q6H PRN Nataly Lemus MD      enoxaparin  40 mg Subcutaneous Daily Balbir Clay Baldemar Fall MD      fluticasone  1 spray Each Nare Daily Moses Silva, DO      HYDROmorphone  0 5 mg Intravenous Q4H PRN Chester Lobe, MD      loratadine  10 mg Oral Daily Moses Silva, DO      metoclopramide  10 mg Intravenous Q6H PRN Sabina Higuera MD      mirtazapine  7 5 mg Oral HS Chester Lobe, MD      ondansetron  4 mg Intravenous Q6H PRN Chester Lobe, MD      oxyCODONE  10 mg Oral Q4H PRN Chester Lobe, MD      oxyCODONE  5 mg Oral Q4H PRN Chester Lobe, MD      pantoprazole  20 mg Oral Early Morning Chester Lobe, MD      senna-docusate sodium  2 tablet Oral HS Cira Brooks DO          Today, Patient Was Seen By: Chelsea Vu MD    **Please Note: This note may have been constructed using a voice recognition system  **

## 2022-04-12 NOTE — PLAN OF CARE
Problem: PAIN - ADULT  Goal: Verbalizes/displays adequate comfort level or baseline comfort level  Description: Interventions:  - Encourage patient to monitor pain and request assistance  - Assess pain using appropriate pain scale  - Administer analgesics based on type and severity of pain and evaluate response  - Implement non-pharmacological measures as appropriate and evaluate response  - Consider cultural and social influences on pain and pain management  - Notify physician/advanced practitioner if interventions unsuccessful or patient reports new pain  Outcome: Progressing     Problem: GASTROINTESTINAL - ADULT  Goal: Minimal or absence of nausea and/or vomiting  Description: INTERVENTIONS:  - Administer IV fluids if ordered to ensure adequate hydration  - Maintain NPO status until nausea and vomiting are resolved  - Nasogastric tube if ordered  - Administer ordered antiemetic medications as needed  - Provide nonpharmacologic comfort measures as appropriate  - Advance diet as tolerated, if ordered  - Consider nutrition services referral to assist patient with adequate nutrition and appropriate food choices  Outcome: Progressing  Goal: Maintains or returns to baseline bowel function  Description: INTERVENTIONS:  - Assess bowel function  - Encourage oral fluids to ensure adequate hydration  - Administer IV fluids if ordered to ensure adequate hydration  - Administer ordered medications as needed  - Encourage mobilization and activity  - Consider nutritional services referral to assist patient with adequate nutrition and appropriate food choices  Outcome: Progressing  Goal: Maintains adequate nutritional intake  Description: INTERVENTIONS:  - Monitor percentage of each meal consumed  - Identify factors contributing to decreased intake, treat as appropriate  - Assist with meals as needed  - Monitor I&O, weight, and lab values if indicated  - Obtain nutrition services referral as needed  Outcome: Progressing Problem: GENITOURINARY - ADULT  Goal: Maintains or returns to baseline urinary function  Description: INTERVENTIONS:  - Assess urinary function  - Encourage oral fluids to ensure adequate hydration if ordered  - Administer IV fluids as ordered to ensure adequate hydration  - Administer ordered medications as needed  - Offer frequent toileting  - Follow urinary retention protocol if ordered  Outcome: Progressing  Goal: Absence of urinary retention  Description: INTERVENTIONS:  - Assess patients ability to void and empty bladder  - Monitor I/O  - Bladder scan as needed  - Discuss with physician/AP medications to alleviate retention as needed  - Discuss catheterization for long term situations as appropriate  Outcome: Progressing     Problem: METABOLIC, FLUID AND ELECTROLYTES - ADULT  Goal: Fluid balance maintained  Description: INTERVENTIONS:  - Monitor labs   - Monitor I/O and WT  - Instruct patient on fluid and nutrition as appropriate  - Assess for signs & symptoms of volume excess or deficit  Outcome: Progressing     Problem: Nutrition/Hydration-ADULT  Goal: Nutrient/Hydration intake appropriate for improving, restoring or maintaining nutritional needs  Description: Monitor and assess patient's nutrition/hydration status for malnutrition  Collaborate with interdisciplinary team and initiate plan and interventions as ordered  Monitor patient's weight and dietary intake as ordered or per policy  Utilize nutrition screening tool and intervene as necessary  Determine patient's food preferences and provide high-protein, high-caloric foods as appropriate       INTERVENTIONS:  - Monitor oral intake, urinary output, labs, and treatment plans  - Assess nutrition and hydration status and recommend course of action  - Evaluate amount of meals eaten  - Assist patient with eating if necessary   - Allow adequate time for meals  - Recommend/ encourage appropriate diets, oral nutritional supplements, and vitamin/mineral supplements  - Order, calculate, and assess calorie counts as needed  - Recommend, monitor, and adjust tube feedings and TPN/PPN based on assessed needs  - Assess need for intravenous fluids  - Provide specific nutrition/hydration education as appropriate  - Include patient/family/caregiver in decisions related to nutrition  Outcome: Progressing     Problem: Potential for Falls  Goal: Patient will remain free of falls  Description: INTERVENTIONS:  - Educate patient/family on patient safety including physical limitations  - Instruct patient to call for assistance with activity   - Consult OT/PT to assist with strengthening/mobility   - Keep Call bell within reach  - Keep bed low and locked with side rails adjusted as appropriate  - Keep care items and personal belongings within reach  - Initiate and maintain comfort rounds  - Make Fall Risk Sign visible to staff  - Apply yellow socks and bracelet for high fall risk patients  - Consider moving patient to room near nurses station  Outcome: Progressing

## 2022-04-13 PROBLEM — C80.1 OBSTRUCTIVE JAUNDICE DUE TO CANCER (HCC): Status: RESOLVED | Noted: 2022-01-01 | Resolved: 2022-01-01

## 2022-04-13 PROBLEM — K83.1 OBSTRUCTIVE JAUNDICE DUE TO CANCER (HCC): Status: RESOLVED | Noted: 2022-01-01 | Resolved: 2022-01-01

## 2022-04-13 NOTE — ASSESSMENT & PLAN NOTE
68 y/o male with a history of newly diagnosed metastatic pancreatic adenocarcinoma w/ mets to liver, completed 3 cycles of chemo & currently on Gemcitabine & Oxaliplatin  Recent EGD 3/25 showed a stricture of 1st part of duodenum  Was scheduled for EGD 4/15 with duodenal stent placement for stricture, however now presents with intent to expedite procedure d/t worsening inability to tolerate p o  · GI consulted  · 4/6: s/p EGD with duodenal stent placement  Upper GI series completed 4/8: "Proximal duodenal stent and opacified lumen are narrowed compared to the distal aspect as described  Thin barium contrast traverses the duodenal stent with antegrade progression to small and large bowel without evidence for holdup "    4/10/22: patient with abdominal pain last night after laying down, mild nausea with no emesis resolved with antiemetics  Will continue full liquid diet today as patient tolerated breakfast thus far  Will advance to puree tomorrow if continues to tolerate  4/11/22: patient denies any nausea or vomiting this morning, however has not been fully tolerating puree diet, denies abdominal pain currently  Discussed with GI, will monitor for another 24 hours  4/12: pt feels as if he's able to tolerance more intake today but would like to stay an additional 24h for monitoring, will plan for discharge tomorrow  4/13: discharge home today, pt counseled on diet with soft and purred foods  Will follow up outpatient with GI and oncology

## 2022-04-13 NOTE — ASSESSMENT & PLAN NOTE
History of newly diagnosed metastatic pancreatic adenocarcinoma w/ mets to liver, completed 3 cycles of chemo (currently on Gemcitabine & Oxaliplatin)  S/p ERCP w/ biliary stent placement 1/20/22  Follows with Dr Jackie Dickinson outpatient        · Continue outpatient follow-up with hem/onc  · PRN zofran which he has at home

## 2022-04-13 NOTE — ASSESSMENT & PLAN NOTE
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

## 2022-04-13 NOTE — DISCHARGE INSTR - AVS FIRST PAGE
Avoid hard textured foods  Choose soft foods such as protein shakes, eggs, yogurt, boiled and mashed vegetables  Finely chop or puree firmer foods thoroughly before eating  Return to the emergency department if you develop worsening pain or vomiting

## 2022-04-13 NOTE — DISCHARGE SUMMARY
[address] [facility]  Discharge- Jayda Huang 1945, 67 y o  male MRN: [id number]  Unit/Bed#: [facility] 815-01 Encounter: [id number]  Primary Care Provider: Emely Butler DO   Date and time admitted to hospital: 4/4/2022 10:56 AM    * Duodenal stricture  Assessment & Plan  68 y/o male with a history of newly diagnosed metastatic pancreatic adenocarcinoma w/ mets to liver, completed 3 cycles of chemo & currently on Gemcitabine & Oxaliplatin  Recent EGD 3/25 showed a stricture of 1st part of duodenum  Was scheduled for EGD 4/15 with duodenal stent placement for stricture, however now presents with intent to expedite procedure d/t worsening inability to tolerate p o  · GI consulted  · 4/6: s/p EGD with duodenal stent placement  Upper GI series completed 4/8: "Proximal duodenal stent and opacified lumen are narrowed compared to the distal aspect as described  Thin barium contrast traverses the duodenal stent with antegrade progression to small and large bowel without evidence for holdup "    4/10/22: patient with abdominal pain last night after laying down, mild nausea with no emesis resolved with antiemetics  Will continue full liquid diet today as patient tolerated breakfast thus far  Will advance to puree tomorrow if continues to tolerate  4/11/22: patient denies any nausea or vomiting this morning, however has not been fully tolerating puree diet, denies abdominal pain currently  Discussed with GI, will monitor for another 24 hours  4/12: pt feels as if he's able to tolerance more intake today but would like to stay an additional 24h for monitoring, will plan for discharge tomorrow  4/13: discharge home today, pt counseled on diet with soft and purred foods  Will follow up outpatient with GI and oncology      Severe protein-calorie malnutrition ([facility] [address] [address] )  Assessment & Plan  Malnutrition Findings:   Adult Malnutrition type: Chronic illness  Adult Degree of Malnutrition: Other severe protein calorie malnutrition  Malnutrition Characteristics: Inadequate energy,Weight loss                  360 Statement: Related to metastatic pancreatic cancer as evidenced by 20% weight loss over the past 4 months and <75% energy intake needs met >1 month treated with pending diet advancement    BMI Findings: Body mass index is 24 94 kg/m²  · Secondary to chronic illness - evidenced by 20% weight loss over the past 4 months and <75% energy intake needs met >1 month  · Nutrition consult         Leukocytosis  Assessment & Plan  Leukocytosis on admission with WBC 23  No clear source of infection  UA negative  Suspect secondary to Neulasta administerd w/ last dose of chemo      · Monitor off antibiotics   · Follow-up blood cultures-1/2 positive for actinomyces neuii likely contaminant, repeat blood cultures are negative x 48h      Anemia  Assessment & Plan  Likely secondary to malignancy/chronic blood loss  No source of bleeding was identified on last EGD, however it was not an ideal study due to retained foods and blood clot  Currently no signs of active bleeding  ·   · Monitor hemoglobin   · Transfuse for hemoglobin < 7    Malignant neoplasm of head of pancreas [facility])  Assessment & Plan  History of newly diagnosed metastatic pancreatic adenocarcinoma w/ mets to liver, completed 3 cycles of chemo (currently on Gemcitabine & Oxaliplatin)  S/p ERCP w/ biliary stent placement 1/20/22  Follows with Dr Rober Clemons outpatient  · Continue outpatient follow-up with hem/onc  · PRN zofran which he has at home    GERD (gastroesophageal reflux disease)  Assessment & Plan  Stable      · Continue protonix 20 mg daily        Medical Problems             Resolved Problems  Date Reviewed: 4/11/2022          Resolved    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 [facility]) 4/13/2022     Resolved by  Noni Soulier, MD              Discharging Physician / Practitioner: Lisa Cortez MD  PCP: Oralia Fox, DO  Admission Date:   Admission Orders (From admission, onward)     Ordered        04/04/22 1424  Inpatient Admission  Once                      Discharge Date: 04/13/22    Consultations During Hospital Stay:  · GI    Procedures Performed:   · Endoscopy with duodenal stent placement    Significant Findings / Test Results:   CT scan: Pancreatic mass most consistent with neoplasm      Worsening hepatic metastases      New omental metastases and small ascites      Pneumobilia with placement of the common bile duct stent      Stable left lower neck and right retrocrural lymphadenopathy  Slightly worsening retroperitoneal lymphadenopathy concerning for worsening metastases      Stable pulmonary nodules  One pulmonary nodule in the left lower lobe has slightly increased in size, indeterminate  No focal airspace consolidation  Incidental Findings:   · None    Test Results Pending at Discharge (will require follow up): · None     Outpatient Tests Requested:  · None    Complications:  None    Reason for Admission: duodenal stricture    Hospital Course: Igor Marrero is a 67 y o  male patient who originally presented to the hospital on 4/4/2022 due to duodenal 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  He had been unable to tolerate solid foods as an outpatient  He was transferred to the ED and admitted for evaluation and treatment  During his hospital course an EGD and stent placement were performed  He remained stable post procedure and diet was advanced to a soft/puree diet  He was discharged home to follow up with GI and oncology outpatient  Please see above list of diagnoses and related plan for additional information  Condition at Discharge: stable    Discharge Day Visit / Exam:   Subjective:  Reports mild abdominal bloating and nausea  No vomiting   Tolerating liquids  Vitals: Blood Pressure: 111/70 (04/13/22 0649)  Pulse: 90 (04/11/22 2300)  Temperature: (!) 97 3 °F (36 3 °C) (04/13/22 0649)  Temp Source: Oral (04/11/22 2300)  Respirations: 18 (04/13/22 0649)  Height: 5' 8" (172 7 cm) (04/04/22 2218)  Weight - Scale: 74 4 kg (164 lb) (04/04/22 2218)  SpO2: 95 % (04/11/22 2300)  Exam:   Physical Exam  Constitutional:       Appearance: Normal appearance  HENT:      Nose: Nose normal       Mouth/Throat:      Mouth: Mucous membranes are moist       Pharynx: Oropharynx is clear  Eyes:      Extraocular Movements: Extraocular movements intact  Cardiovascular:      Rate and Rhythm: Normal rate and regular rhythm  Pulmonary:      Effort: Pulmonary effort is normal       Breath sounds: No wheezing or rales  Abdominal:      Palpations: Abdomen is soft  Skin:     General: Skin is warm and dry  Neurological:      General: No focal deficit present  Mental Status: He is alert and oriented to person, place, and time  Psychiatric:         Mood and Affect: Mood normal          Behavior: Behavior normal             Discharge instructions/Information to patient and family:   See after visit summary for information provided to patient and family  Provisions for Follow-Up Care:  See after visit summary for information related to follow-up care and any pertinent home health orders  Disposition:   Home    Planned Readmission: no     Discharge Statement:  I spent 45 minutes discharging the patient  This time was spent on the day of discharge  I had direct contact with the patient on the day of discharge  Greater than 50% of the total time was spent examining patient, answering all patient questions, arranging and discussing plan of care with patient as well as directly providing post-discharge instructions  Additional time then spent on discharge activities  Discharge Medications:  See after visit summary for reconciled discharge medications provided to patient and/or family        **Please Note: This note may have been constructed using a voice recognition system**

## 2022-04-15 NOTE — PROGRESS NOTES
Hematology/Oncology Outpatient Follow- up Note  Darrin Hawk 67 y o  male MRN: @ Encounter: [id number]        Date:  4/15/2022    Presenting Complaint/Diagnosis : Metastatic pancreatic cancer  With a BRCA1 mutation    HPI:    The patient is a pleasant 44-year-old male with a newly diagnosed 6 4 x 5 7 x 4 9 cm obstructing pancreatic head mass with internal necrosis highly worrisome for primary pancreatic neoplasm with multiple liver metastases and retroperitoneal adenopathy suggestive of metastatic disease   There was also diffusely mottled appearance of pelvic osseous structures from prior prostate radiation on imaging although metastatic disease could not be ruled out   The patient had an EUS with biopsy of both the liver lesion and the pancreatic mass both of which revealed adenocarcinoma consistent with metastatic pancreatic cancer   The patient was referred to see us for further evaluation  Dion Lo was evaluated in the hospital when he was admitted with jaundice and elevated liver function tests which are improving over the last 1 and half weeks   The patient was seen at the [address] [place] [address] and they have recommended gemcitabine and oxaliplatin which is a reasonable regimen for this       Previous Hematologic/ Oncologic History:    Oncology History   Malignant neoplasm of head of pancreas ([facility] [address] )   2/1/2022 Initial Diagnosis    Malignant neoplasm of head of pancreas ([facility] [address] )     2/4/2022 -  Chemotherapy    pegfilgrastim (Autumn Orovada), 6 mg, Subcutaneous, Once, 4 of 6 cycles  Administration: 6 mg (2/11/2022), 6 mg (3/4/2022), 6 mg (3/25/2022)  gemcitabine (GEMZAR) infusion, 1,990 1 mg, Intravenous, Once, 4 of 6 cycles  Administration: 2,000 mg (2/4/2022), 2,000 mg (2/11/2022), 2,000 mg (2/25/2022), 2,000 mg (3/4/2022), 2,000 mg (3/18/2022), 2,000 mg (3/25/2022)  oxaliplatin (ELOXATIN) chemo infusion, 85 mg/m2 = 169 15 mg (85 % of original dose 100 mg/m2), Intravenous, Once, 4 of 6 cycles  Dose modification: 85 mg/m2 (original dose 100 mg/m2, Cycle 1, Reason: Performance Status), 65 mg/m2 (original dose 100 mg/m2, Cycle 4, Reason: Dose Not Tolerated, Comment: Neuropathy )  Administration: 169 15 mg (2/4/2022), 164 9 mg (2/25/2022), 162 35 mg (3/18/2022)       Gemcitabine and oxaliplatin for 3 cycles with progression in omental disease and liver metastases    Current Hematologic/ Oncologic Treatment:    The patient will be switched to 5 FU along with oxaliplatin and veliparib  He will get Neulasta growth factor support  This will be modified to every 3 weeks  Interval History:    Patient returns for follow-up visit  He was in the hospital with a duodenal obstruction  Ended up having a stent placed  ECOG performance status today is 2-3  Has lost weight  Is not eating well  I advised him to increase his calorie intake  His most recent imaging shows progression in both his liver and omental disease  This obviously is not a positive development since he had had 3 cycles of treatment and it appears he is not having a good response  He does have a brac a 1 mutation so veliparib as an option  Since he had progression in all areas on gemcitabine with a platinum agent I will switch him to a 5 fluorouracil Backbone, keep the platinum agent an add on veliparib  I explained this to the patient was agreeable  His main complaint today is lack of appetite and decreased calorie intake  He feels very fatigued  His daughter states he does not get out of bed much  She states he was surviving at best on the chemotherapy so I think it is reasonable to switch at this point  I will discontinue Leucovorin and 5 fluorouracil bolus secondary to his performance status  This can always be added on down the road  He is in a wheelchair today  The rest of his 14 point review of systems today was negative        Test Results:    Imaging: EGD Fluoro    Result Date: 4/6/2022  Narrative: [address] [place] [address] Endoscopy [address] [facility] [address] DATE OF SERVICE: 4/06/22 PHYSICIAN(S): Attending: Marin Ellington MD Fellow: No Staff Documented INDICATION: Duodenal stricture POST-OP DIAGNOSIS: See the impression below  PREPROCEDURE: Informed consent was obtained for the procedure, including sedation  Risks of perforation, hemorrhage, adverse drug reaction and aspiration were discussed  The patient was placed in the left lateral decubitus position  Patient was explained about the risks and benefits of the procedure  Risks including but not limited to bleeding, infection, and perforation were explained in detail  Also explained about less than 100% sensitivity with the exam and other alternatives  DETAILS OF PROCEDURE: Patient was taken to the procedure room where a time out was performed to confirm correct patient and correct procedure  The patient underwent monitored anesthesia care, which was administered by an anesthesia professional  The patient's blood pressure, heart rate, level of consciousness, respirations and oxygen were monitored throughout the procedure  The scope was advanced to the second part of the duodenum  Retroflexion was performed in the fundus  The patient experienced no blood loss  The procedure was not difficult  The patient tolerated the procedure well  There were no apparent complications  ANESTHESIA INFORMATION: ASA: III Anesthesia Type: Anesthesia type not filed in the log  MEDICATIONS: No administrations occurring from 1526 to 1605 on 04/06/22 FINDINGS: The esophagus appeared normal  Large amount of liquid and food residue in the stomach - suctioned  Stomach was distended  Normal duodenum but tight stricture at the duodenal sweep  Malignant-appearing stricture (not traversable) with length of 4 cm and diameter of 1 mm in the 1st part of the duodenum; placed uncovered stent with length of 90 mm and diameter of 22 Fr using fluoroscopic guidance and guidewire   The stent was dilated with a 10mm CRE balloon and despite this the area was very tight and could not be traversed - bile was seen coming through and contrast injection confirmed placement  SPECIMENS: * No specimens in log *     Impression: Successful duodenal stent placement across the area of duodenal malignant obstruction in the sweep  The biliary stent was also seen  RECOMMENDATION: Clear liquid diet  UGI tomorrow - if stent opens up then can start pureed / soft foods  Marin Ellington MD     EGD    Result Date: 3/31/2022  Narrative: [address] End[address] [id number] [phone] DATE OF SERVICE: 3/31/22 PHYSICIAN(S): Attending: Katie Ames DO Fellow: No Staff Documented INDICATION: Blood loss anemia POST-OP DIAGNOSIS: See the impression below  PREPROCEDURE: Informed consent was obtained for the procedure, including sedation  Risks of perforation, hemorrhage, adverse drug reaction and aspiration were discussed  The patient was placed in the left lateral decubitus position  Patient was explained about the risks and benefits of the procedure  Risks including but not limited to bleeding, infection, and perforation were explained in detail  Also explained about less than 100% sensitivity with the exam and other alternatives  DETAILS OF PROCEDURE: Patient was taken to the procedure room where a time out was performed to confirm correct patient and correct procedure  The patient underwent monitored anesthesia care, which was administered by an anesthesia professional  The patient's blood pressure, heart rate, level of consciousness, respirations and oxygen were monitored throughout the procedure  The scope was advanced to the first part of the duodenum  Retroflexion was performed in the fundus  The patient experienced no blood loss  The procedure was not difficult  The patient tolerated the procedure well  There were no apparent complications   ANESTHESIA INFORMATION: ASA: III Anesthesia Type: IV Sedation with Anesthesia MEDICATIONS: No administrations occurring from 1353 to 1421 on 03/31/22 FINDINGS: The esophagus appeared normal  The stomach appeared normal  450 cc of fluid and semi solid food suctioned from the stomach and duodenum Malignant-appearing and inflamed stricture in the 1st part of the duodenum  Not traversed, area obscured by clot and fluid SPECIMENS: * No specimens in log *     Impression: Normal esophagus 450 cc of fluid and semi solid food suctioned from the stomach and duodenum Inflamed malignant stricture at the distal bulb, corresponding to area that was dilated on recent ERCP  The area was obscured by clot and fluid and was not traversed RECOMMENDATION: Will communicate with advanced endoscopist at [place] that did his EUS/ERCP about palliative duodenal stent   Magdalene Seen, DO     CT chest abdomen pelvis wo contrast    Result Date: 4/4/2022  Narrative: CT CHEST, ABDOMEN AND PELVIS WITHOUT IV CONTRAST INDICATION:   meets SIRS criteria, immunocompromised, hx pancreatic ca with obstruction  please assess for pna or possible intraabd source of sepsis  COMPARISON:  Prior chest CT study dated 2/5/2022 and CT scan of the abdomen and pelvis dated 1/19/2022  TECHNIQUE: CT examination of the chest, abdomen and pelvis was performed without intravenous contrast   Axial, sagittal, and coronal 2D reformatted images were created from the source data and submitted for interpretation  Radiation dose length product (DLP) for this visit:  795 14 mGy-cm   This examination, like all CT scans performed in the [facility], was performed utilizing techniques to minimize radiation dose exposure, including the use of iterative  reconstruction and automated exposure control  Enteric contrast was not administered  FINDINGS: CHEST LUNGS:  Stable 3 mm perifissural nodule in the left on series 3, image 50  Stable 4 mm nodule right lower lobe on series 3, image 69    Stable 2 mm pulmonary nodule right lower lobe on series 3, image 93   4 mm subpleural nodule left lower lobe medially on series 3, image 80, previously measuring 2 mm  Stable 3 mm nodule right lower lobe on series 3, image 79  Linear scarring in both lower lobes  No focal airspace consolidation  No obvious endobronchial lesion  PLEURA:  Unremarkable  HEART/GREAT VESSELS: Heart is unremarkable for patient's age  No thoracic aortic aneurysm  Extensive coronary artery and scattered aortic calcifications  No pericardial effusion  Right chest wall dinesh catheter in place  MEDIASTINUM AND HAN:  There are stable appearing left lower neck enlarged lymph nodes measuring up to 2 cm  No mediastinal or hilar lymphadenopathy seen  Stable right retrocrural enlarged lymph nodes measuring up to 2 5 cm  No axillary lymphadenopathy  CHEST WALL AND LOWER NECK:   Right chest wall dinesh catheter in place  No thyroid gland enlargement  ABDOMEN LIVER/BILIARY TREE:  There is pneumobilia with a common bile duct stent in place  There are numerous hypodense lesions scattered throughout the liver which have increased in size and number since the prior studies compatible with worsening metastatic disease  GALLBLADDER:  Status post cholecystectomy  SPLEEN:  Unremarkable  PANCREAS:  Pancreatic head/uncinate process mass is redemonstrated somewhat limited without intravenous contrast  ADRENAL GLANDS:  Unremarkable  KIDNEYS/URETERS:  Large left renal parapelvic cysts  Tiny right renal cyst   Increased density within the left renal collecting system may reflect retained contrast   No hydroureter  STOMACH AND BOWEL:  No bowel obstruction  Stool seen scattered throughout the colon  No CT evidence for colitis or diverticulitis  APPENDIX:  A normal appendix was visualized  ABDOMINOPELVIC CAVITY:  There is small ascites seen in the abdomen and pelvis, new since the prior study    There are also omental nodular soft tissue densities not seen on the prior study suspicious for omental metastatic disease  There is retroperitoneal lymphadenopathy, increased in size  For example in the left periaortic region at the level of the left kidney on series 2, image 65, there is a 3 cm lymph node previously measuring 1 7 cm  VESSELS:  The abdominal aorta is calcified  PELVIS REPRODUCTIVE ORGANS:  Status post prostatectomy  URINARY BLADDER:  Collapsed which limits evaluation  ABDOMINAL WALL/INGUINAL REGIONS:  Unremarkable  OSSEOUS STRUCTURES:  No acute fracture or destructive osseous lesion  Degenerative changes in the thoracolumbar spine  Moderate osteopenia within the bilateral iliac bones and sacrum appear stable  Impression: Pancreatic mass most consistent with neoplasm  Worsening hepatic metastases  New omental metastases and small ascites  Pneumobilia with placement of the common bile duct stent  Stable left lower neck and right retrocrural lymphadenopathy  Slightly worsening retroperitoneal lymphadenopathy concerning for worsening metastases  Stable pulmonary nodules  One pulmonary nodule in the left lower lobe has slightly increased in size, indeterminate  No focal airspace consolidation  Workstation performed: [id number]     XR abdomen 1 view kub    Result Date: 4/13/2022  Narrative: ABDOMEN INDICATION:   Duodenal stent  COMPARISON:  CT chest abdomen pelvis April 4, 2022  Fluoroscopic images from April 8, 2022 VIEWS:  AP supine FINDINGS: Stent in the right upper quadrant are unchanged in position and appearance compared to April 8, 2022 fluoroscopic study  No abnormally dilated loops of small or large bowel  Enteric contrast is present within the left colon  Multilevel degenerative changes of the spine  Impression: Right upper quadrant duodenal stent is unchanged in position and appearance compared to April 8, 2022 fluoroscopic study   Workstation performed: [id number]     XR chest 1 view    Result Date: 4/7/2022  Narrative: C-ARM - INDICATION: duodenal stricture, abdominal pain   Procedure guidance  COMPARISON:  None TECHNIQUE: FLUOROSCOPY TIME:   2min 45 sec 10 FLUOROSCOPIC IMAGES FINDINGS: Fluoroscopic guidance provided for procedure guidance  Endoscope in duodenum with duodenal stent placement  Biliary stent  Osseous and soft tissue detail limited by technique  Impression: Fluoroscopic guidance provided for procedure guidance  Please refer to the separate procedure notes for additional details  Workstation performed: [id number]     FL UGI/sm bowel    Result Date: 4/8/2022  Narrative: UPPER GI AND SMALL BOWEL FOLLOW THROUGH SINGLE CONTRAST INDICATION:   Duodenal stricture  Metastatic pancreatic adenocarcinoma status post ERCP with biliary stent placement on 01/22 and now on chemotherapy  EGD with duodenal stent placement on 4/6  Significant narrowing noted  Placed 9 cm x 22 Fr uncovered metal stent across stricture but with significantly tight even with stent  Stent dilated to 10 mm  Unable to be traversed with standard endoscope despite dilation  COMPARISON:   CT chest abdomen pelvis 04/04/2022  Fluoroscopic stent placement 04/06/2022  IMAGES:  212  (this includes cine capture images) FLUOROSCOPY TIME:   2 97 minutes TECHNIQUE:    view of the abdomen was obtained and demonstrates right-sided vascular port, tip overlies the cavoatrial junction  CBD stent  Duodenal stent in the descending duodenum with narrowing at its cranial aspect  Some retained contrast in the colon  Degenerative changes  Upper GI was performed utilizing barium orally to the patient  Subsequently, a small bowel follow through was performed including spot images of the terminal ileum  FINDINGS: The esophagus is normal in caliber  Esophageal motility is normal and emptying of contrast from the esophagus is prompt  No mucosal abnormalities although evaluation is limited with single contrast technique  The stomach is unremarkable in size    No gross gastric mucosal abnormalities although evaluation limited with single contrast technique  Contrast empties promptly into the duodenum and traverses the duodenal stent with antegrade progression to proximal jejunum without evidence for holdup  Proximal stent diameter is narrowed, measuring approximately 15 mm compared to the distal stent diameter measuring approximately 25 mm as is the opacified lumen which measures approximately 6 mm proximally and 18 mm distally  The ligament of Treitz/duodenojejunal junction lies in a normal position  Gastroesophageal reflux was not observed  There is no hiatal hernia  Normal caliber small bowel loops  There is a normal fold pattern and thickness  Dense contrast obscures fine mucosal detail  Impression: Proximal duodenal stent and opacified lumen are narrowed compared to the distal aspect as described  Thin barium contrast traverses the duodenal stent with antegrade progression to small and large bowel without evidence for holdup  The study was marked in EPIC for significant notification  Workstation performed: [id number]     Flexible Sigmoidoscopy    Result Date: 3/31/2022  Narrative: [address] End[address] [id number] [phone] DATE OF SERVICE: 3/31/22 PHYSICIAN(S): Attending: Filiberto Calzada DO Fellow: No Staff Documented INDICATION: Blood loss anemia POST-OP DIAGNOSIS: See the impression below  HISTORY: Prior colonoscopy: More than 10 years ago  BOWEL PREPARATION: Enema PREPROCEDURE: Informed consent was obtained for the procedure, including sedation  Risks including but not limited to bleeding, infection, perforation, adverse drug reaction and aspiration were explained in detail  Also explained about less than 100% sensitivity with the exam and other alternatives  The patient was placed in the left lateral decubitus position   DETAILS OF PROCEDURE: Patient was taken to the procedure room where a time out was performed to confirm correct patient and correct procedure  The patient underwent monitored anesthesia care, which was administered by an anesthesia professional  The patient's blood pressure, heart rate, level of consciousness, oxygen and respirations were monitored throughout the procedure  A digital rectal exam was performed  The scope was introduced through the anus and advanced to the splenic flexure  Retroflexion was performed in the rectum  The quality of bowel preparation was evaluated using the [facility] Bowel Preparation Scale with scores of: right colon = not assessed, transverse colon = not assessed, left colon = 3  The patient experienced no blood loss  The procedure was not difficult  The patient tolerated the procedure well  There were no apparent complications  The total BBPS score was 3  ANESTHESIA INFORMATION: ASA: III Anesthesia Type: IV Sedation with Anesthesia MEDICATIONS: No administrations occurring from 1353 to 1421 on 03/31/22 FINDINGS: All observed locations appeared normal  Small, internal hemorrhoids EVENTS: Procedure Events Event Event Time ENDO SCOPE OUT TIME 3/31/2022  2:06 PM ENDO SCOPE OUT TIME 3/31/2022  2:14 PM SPECIMENS: * No specimens in log * EQUIPMENT: Colonoscope -     Impression: Small internal hemorrhoids, otherwise normal exam up to splenic flexure with no source of GI blood loss RECOMMENDATION: No further screening sigmoidoscopies necessary due to age (age = 76 or greater)    María Casas DO       Labs:   Lab Results   Component Value Date    WBC 18 04 (H) 04/12/2022    HGB 8 9 (L) 04/12/2022    HCT 29 0 (L) 04/12/2022     (H) 04/12/2022     (H) 04/12/2022     Lab Results   Component Value Date    K 3 5 04/13/2022     04/13/2022    CO2 29 04/13/2022    BUN 15 04/13/2022    CREATININE 0 79 04/13/2022    GLUF 164 (H) 03/03/2022    CALCIUM 8 7 04/13/2022    CORRECTEDCA 10 3 (H) 04/12/2022    AST 34 04/12/2022    ALT 16 04/12/2022    ALKPHOS 160 (H) 04/12/2022    EGFR 86 04/13/2022       Lab Results   Component Value Date    IRON 42 (L) 02/25/2022    TIBC 199 (L) 02/25/2022    FERRITIN 911 (H) 02/25/2022       Lab Results   Component Value Date    JHCPWVTG65 >6,000 (H) 04/04/2022         ROS: As stated in the history of present illness otherwise his 14 point review of systems today was negative  Active Problems:   Patient Active Problem List   Diagnosis    Pain of upper abdomen    Change in bowel habit    Prostate cancer ([place] [address] )    BRCA gene mutation positive in male    Coronary artery disease involving native coronary artery of native heart without angina pectoris    Pancreatic mass    Pure hypercholesterolemia    Primary hypertension    Blood loss anemia    Stage 3 chronic kidney disease ([place] [address] )    Ischemic cardiomyopathy    History of PTCA    GERD (gastroesophageal reflux disease)    Palliative care patient    Malignant neoplasm of head of pancreas ([place] [address] )    Chemotherapy induced neutropenia (HCC)    Dehydration    Anemia    Duodenal stricture    Leukocytosis    Severe protein-calorie malnutrition (HCC)       Past Medical History:   Past Medical History:   Diagnosis Date    CAD (coronary artery disease)     GERD (gastroesophageal reflux disease)     Hyperlipidemia     Hypertension     Pancreatic cancer (HCC)     Prostate cancer ([place] [address] )        Surgical History:   Past Surgical History:   Procedure Laterality Date    CHOLECYSTECTOMY      CORONARY ANGIOPLASTY WITH STENT PLACEMENT      IR PORT PLACEMENT  2/10/2022       Family History:    Family History   Problem Relation Age of Onset    Pancreatic cancer Mother     Dementia Father     Esophageal cancer Sister        Cancer-related family history includes Esophageal cancer in his sister; Pancreatic cancer in his mother      Social History:   Social History     Socioeconomic History    Marital status: /Civil Union     Spouse name: Not on file    Number of children: Not on file    Years of education: Not on file    Highest education level: Not on file   Occupational History    Not on file   Tobacco Use    Smoking status: Never Smoker    Smokeless tobacco: Never Used   Vaping Use    Vaping Use: Never used   Substance and Sexual Activity    Alcohol use: Not Currently    Drug use: Never    Sexual activity: Not Currently   Other Topics Concern    Not on file   Social History Narrative    Not on file     Social Determinants of Health     Financial Resource Strain: Not on file   Food Insecurity: No Food Insecurity    Worried About Running Out of Food in the Last Year: Never true    Jaun of Food in the Last Year: Never true   Transportation Needs: No Transportation Needs    Lack of Transportation (Medical): No    Lack of Transportation (Non-Medical): No   Physical Activity: Not on file   Stress: Not on file   Social Connections: Not on file   Intimate Partner Violence: Not on file   Housing Stability: Unknown    Unable to Pay for Housing in the Last Year: No    Number of Places Lived in the Last Year: Not on file    Unstable Housing in the Last Year: No       Current Medications:   Current Outpatient Medications   Medication Sig Dispense Refill    aspirin (ECOTRIN LOW STRENGTH) 81 mg EC tablet Take 81 mg by mouth daily      bisacodyl (DULCOLAX) 10 mg suppository Insert 1 suppository (10 mg total) into the rectum daily as needed for constipation 12 suppository 0    cetirizine (ZyrTEC) 10 mg tablet Take 10 mg by mouth      Elastic Bandages & Supports (Medical Compression Socks) MISC Use in the morning 20 - 30mmHG  If possible, please use compression socks with high pressure near foot/ankle (gradated)   2 each 0    mirtazapine (REMERON) 7 5 MG tablet Take 1 tablet (7 5 mg total) by mouth daily at bedtime 30 tablet 0    ondansetron (ZOFRAN) 4 mg tablet Take 1 tablet (4 mg total) by mouth every 8 (eight) hours as needed for nausea or vomiting 90 tablet 0    oxyCODONE (ROXICODONE) 5 immediate release tablet Take 1 - 2 tablets PO Q6H PRN pain  120 tablet 0    pantoprazole (PROTONIX) 20 mg tablet       polyethylene glycol (MIRALAX) 17 g packet Take 17 g by mouth daily 10 each 0    senna (SENOKOT) 8 6 mg Take 1 tablet (8 6 mg total) by mouth daily at bedtime as needed for constipation 30 tablet 0    bisacodyl (FLEET) 10 MG/30ML ENEM Insert 30 mL (10 mg total) into the rectum once for 1 dose If lactulose or suppository not helpful 30 mL 0     No current facility-administered medications for this visit  Allergies: No Known Allergies    Physical Exam:    Body surface area is 1 95 meters squared  Wt Readings from Last 3 Encounters:   04/15/22 81 6 kg (180 lb)   04/04/22 74 4 kg (164 lb)   04/04/22 74 4 kg (164 lb)        Temp Readings from Last 3 Encounters:   04/15/22 (!) 97 4 °F (36 3 °C) (Tympanic)   04/13/22 (!) 97 3 °F (36 3 °C)   04/04/22 (!) 96 8 °F (36 °C) (Temporal)        BP Readings from Last 3 Encounters:   04/15/22 110/80   04/13/22 111/70   04/04/22 120/70         Pulse Readings from Last 3 Encounters:   04/15/22 (!) 116   04/11/22 90   04/04/22 96         Physical Exam     Constitutional   General appearance: No acute distress, malnourished  Eyes   Conjunctiva and lids: No swelling, erythema or discharge  Pupils and irises: Equal, round and reactive to light  Ears, Nose, Mouth, and Throat   External inspection of ears and nose: Normal     Nasal mucosa, septum, and turbinates: Normal without edema or erythema  Oropharynx: Normal with no erythema, edema, exudate or lesions  Pulmonary   Respiratory effort: No increased work of breathing or signs of respiratory distress  Auscultation of lungs: Clear to auscultation  Cardiovascular   Palpation of heart: Normal PMI, no thrills  Auscultation of heart: Normal rate and rhythm, normal S1 and S2, without murmurs      Examination of extremities for edema and/or varicosities: Normal     Carotid pulses: Normal  Abdomen   Abdomen: Non-tender, no masses  Liver and spleen: No hepatomegaly or splenomegaly  Lymphatic   Palpation of lymph nodes in neck: No lymphadenopathy  Musculoskeletal   Gait and station:  In a wheelchair  Digits and nails: Normal without clubbing or cyanosis  Inspection/palpation of joints, bones, and muscles: Normal     Skin   Skin and subcutaneous tissue: Normal without rashes or lesions  Assessment / Plan:    Per the note from Yessica Bahena    The patient is a 79-year-old male with metastatic pancreatic cancer  Patient was having symptoms of progressive epigastric pain associated with constipation and weight loss  Workup demonstrated a large pancreatic head mass, periaortic and periportal adenopathy along with multiple liver metastases  He underwent ERCP with stent placement of biliary stricture on 01/20/2022  [facility] with biopsy of both the liver lesion and the pancreatic mass revealed adenocarcinoma consistent with metastatic pancreatic cancer  He has an underlying in [address] mutation     Patient was evaluated by Bj [facility] [place] and recommended treatment with palliative chemotherapy with gemcitabine and oxaliplatin  He was seen in consultation by Dr Michael Hankins and consented to treatment with gem ox  He began treatment on 2/4/22  At his last visit on April 4th he had Hemoccult-positive stool with symptomatic anemia and ongoing issues with nausea  An EGD was attempted and he was found to have an inflamed malignant stricture at the distal bulb which was obscured by clot and fluid and was not transverse an EGD  He was referred to the emergency room and then admitted and had a duodenal stent placed  He was in the hospital for this  He improved and was discharged  He did have 3 cycles before then and his imaging on the 4th of April showed worsening disease in the liver and worsened disease in the omentum  He does have BRCA1 mutation    I will start him on a PARP inhibitor in combination with 5 fluorouracil and oxaliplatin  I am switching the gemcitabine Backbone since he did have 3 cycles of treatment and had progression and clinically is not doing better at all and actually needed duodenal stent placed  I did explain based on the data the study I am referring to actually looked at cisplatin at 25 mg/m2 and gemcitabine at 600 mg/m2 on days 3 and 10 plus veliparib at 80 mg orally twice daily on days 1 to 12, given every 3 weeks  We will instead give 5 fluorouracil and oxaliplatin as chemotherapy  I do not think he is a candidate for cisplatin at all  Since he progressed on gemcitabine plus oxaliplatin I will switch him to a 5 fluorouracil back but in the hope that he will have a response to that in combination with the PARP inhibitor  I will discontinue Leucovorin and 5 FU bolus secondary to his age and performance status  I will give him Neulasta growth factor support  Because the PARP inhibitor is being given for the 1st 2 weeks I will make him an every 3 week chemotherapy  I explained to him that this is modified based on his performance status and age and the fact that he progressed on gemcitabine and oxaliplatin  The patient is in agreement with this  If he has progression on this we will switch to FOLFIRI plus the PARP inhibitor  The patient is agreeable to proceeding  I went over the risks benefits and alternatives of 5 fluorouracil and veliparib  I explained possible side effects to include but not limited to GI upset, nausea, vomiting, mucositis, low blood counts, fatigue, risk of infection and even death  The patient is agreeable to proceeding  He will get 5 fluorouracil, oxaliplatin and veliparib and the hope that he has a response  I advised him to eat frequent small meals and drink boost and Ensure constantly with protein shakes to increase his p o  Intake and calories  He is agreeable to this    He does not have any evidence of neuropathy at this point with his oxaliplatin  Goals and Barriers:  Current Goal:  Prolong Survival from metastatic pancreatic cancer  Barriers: None  Patient's Capacity to Self Care:  Patient able to self care  Portions of the record may have been created with voice recognition software  Occasional wrong word or "sound a like" substitutions may have occurred due to the inherent limitations of voice recognition software  Read the chart carefully and recognize, using context, where substitutions have occurred

## 2022-04-18 NOTE — PROGRESS NOTES
Pt had labs drawn via RCW port  Labs obtained, port de-accessed and band aid applied  Pt expressed concern with his ability to eat and overall discomfort  Hospice care was brought up while talking to pt  I asked pt if he would feel comfortable speaking with out   Pt agreed  Daughter and  at chairside to discuss pt's options and thought process  Pt felt that his questions and concerns were answered, and left ambulatory with next appt scheduled  Namrata Russo,  to follow up as well if need be

## 2022-04-18 NOTE — TELEPHONE ENCOUNTER
Pt's daughter, Arsenio Chang, called office requesting a wound care order to address pt's right arm wound that occurred during his Hospital stay  Pt is currently at infusion for the next few minutes

## 2022-04-19 NOTE — PATIENT INSTRUCTIONS
Torie Jones,    It was a pleasure taking care of you! Please discontinue Reglan and start compazine for nausea  If you do not get relief, please call out office and we can make further adjustments  We will send referral for hospice service  Please let us know the outcome of this conversation  If you feel up to it, consider trying warm bone broth for protein and nutrition  I will follow up on wound care referral  We can talk about visiting nursing referral if you do not elect to proceed with hospice  Please call with any questions!

## 2022-04-19 NOTE — PROGRESS NOTES
Received VM from daughter asking for clarification/update on oral medication Veliparib  All questions answered  Dr Ashleigh Medina,  Daughter stated Lonza Dioni saw Dr Tanner Schwarz today with [facility], who had a different treatment plan in mind  She stated that Dr Vladimir Diaz will be reaching out to you directly to discuss

## 2022-04-19 NOTE — PROGRESS NOTES
Palliative Outpatient Assessment of Need    MSW completed an assessment of need which was completed with patient and his daughter in the office  Patient was very ill appearing and was very fatigued  Family dynamics:   Relationship status:   Duration of relationship: 46 years  Name of significant other:Isaura  Children and Ages: 1 daughter Meenakshi Beavers, who lives local and a son Fernanda Jordan who lives in [facility]  Pets:  Other important family information:  Living situation: Patient resides with his wife, who has 24 hour caregivers    Patient's primary caregiver:  Self/daughter  Any limitations of caregiver: None  Environmental concerns or barriers:   history:   Employment history/source of income: Retired-worked as a   Disability:  N/A  Spirituality/ Episcopal:    Patient's strengths, social supports, and resources: Patient has strong support from his daughter  She is over at his home often  Cultural information:   Mental Health current or previous:   Substance use or history:   Sleep: Poor  Exercise: N/A  Diet/nutrition:Poor-only able to tolerate 1 Ensure a day  We encouraged him to try milkshakes and smoothies for extra nutrition  Durable Medical Equipment needs: None  Transportation: daughter  Financial concerns: None  Advanced Directive: Was given a 5 wishes in the past  Other medical or social work providers involved: MARJORIE Ch  Patient/caregiver current level of coping:  Patient is not doing well at this time  He states he continues with nausea, Bernal Passey made another adjustment and hopefully this will help, however at this time he is not eating  His only intake is 1 Ensure a day  We made dietary suggestions and he has also been taking the Remeron  Patient is planning on going to his chemo treatment tomorrow and the infusion nurse was able to have him be seen at the wound center at 8:15 for the wound on his arm  In the beginning of the visit he immediately asked about hospice   We educated he and his daughter on hospice  Kandace Arjun asked if he would still be able to take the new oral immunotherapy that Dr Estela Yu prescribed and we expressed that when someone chooses hospice they are choosing to no longer receive any type of cancer treatment  Kandace Díaz expressed that she would like her dad to try the chemo tomorrow and is hopeful that receiving it every 3 weeks he will have time to recuperate in between  Robert Kenney did express concern over him not eating, his fatigue and overall presentation today  She did place a hospice referral so they can gain more information  Patient/family concerns and areas of need: Patient's main concern is his nausea, fatigue, sleep and  constipation    *All questions may not be answered due to constraints    Follow-up discussions may need to occur

## 2022-04-19 NOTE — PROGRESS NOTES
[address] 67 y o  male [phone]    Assessment & Plan  Problem List Items Addressed This Visit        Digestive    Malignant neoplasm of head of pancreas ([facility] ) - Primary    Relevant Medications    prochlorperazine (COMPAZINE) 10 mg tablet    Other Relevant Orders    Ambulatory Referral to Hospice       Genitourinary    Prostate cancer ([place] [address] )    Relevant Medications    prochlorperazine (COMPAZINE) 10 mg tablet    Other Relevant Orders    Ambulatory Referral to Hospice    Stage 3 chronic kidney disease ([place] [address] )    Relevant Orders    Ambulatory Referral to Hospice       Other    BRCA gene mutation positive in male    Chemotherapy induced neutropenia ([place] [address] )    Relevant Orders    Ambulatory Referral to Hospice    Severe protein-calorie malnutrition ([place] [address] )    Relevant Medications    prochlorperazine (COMPAZINE) 10 mg tablet    Other Relevant Orders    Ambulatory Referral to Hospice      Other Visit Diagnoses     Drug-induced constipation        Relevant Medications    bisacodyl (FLEET) 10 MG/30ML ENEM    bisacodyl (DULCOLAX) 10 mg suppository    Other Relevant Orders    Ambulatory Referral to Hospice    Nausea        Relevant Medications    prochlorperazine (COMPAZINE) 10 mg tablet        Plan:   · Discussed goals of care  Edvin Mcneill cites poor quality of life  · He notes limited PO intake and malnutrition  Edvin Mcneill is not interested in PEG, which is not recommended  · I recommended hospice referral and Edvin Mcneill is agreeable  He would like to receive chemo scheduled for tomorrow but then will re-evaluate  · Continue zofran  Start compazine  If no relief, will do Zofran and Reglan with compazine suppository  · Consider olanzapine QHS in place of mirtazipine to help with nausea  · Re-discussed bowel regimen  Edvin Mcneill has not been taking recommended meds  Sent suppository  · Continue artificial saliva  · Encouraged high protein intake such as bone broth   Not tolerating Boost/Ensure  · Confirmed wound care appointment for tomorrow morning  · Continue compression socks  · RTC - 2 - 3 weeks if not established with hospice  Medications adjusted this encounter:  Requested Prescriptions     Signed Prescriptions Disp Refills    prochlorperazine (COMPAZINE) 10 mg tablet 30 tablet 0     Sig: Take 1 tablet (10 mg total) by mouth every 6 (six) hours as needed for nausea or vomiting    bisacodyl (FLEET) 10 MG/30ML ENEM 37 mL 0     Sig: Insert 30 mL (10 mg total) into the rectum once for 1 dose If suppository not helpful    bisacodyl (DULCOLAX) 10 mg suppository 12 suppository 0     Sig: Insert 1 suppository (10 mg total) into the rectum daily as needed for constipation     Orders Placed This Encounter   Procedures    Ambulatory Referral to Hospice     Medications Discontinued During This Encounter   Medication Reason    bisacodyl (FLEET) 10 MG/30ML ENEM Reorder    bisacodyl (DULCOLAX) 10 mg suppository Reorder         Jayda Huang was seen today for symptoms and planning cares related to above illnesses  I have reviewed the patient's controlled substance dispensing history in the Prescription Drug Monitoring Program in compliance with the [facility] regulations before prescribing any controlled substances  Fill Date ID   Written Drug Qty Days Prescriber Rx # Pharmacy Refill   Daily Dose* Pymt Type      02/22/2022  1   02/22/2022  Oxycodone Hcl 5 MG Tablet    120 00  15  Fis   [id number]   Pen (2378)    60 00 [place]   01/27/2022  1   01/27/2022  Oxycodone Hcl 5 MG Tablet    120 00  14  Fis   [id number]   Pen (2082)    64 29 MME  [place]         They are invited to continue to follow with us  If there are questions or concerns, please contact us through our clinic/answering service 24 hours a day, seven days a week      [address], PA-C  [facility]        Visit Information    Accompanied By: Family member, daughter Angela Persaud of History: Self    History Limitations: None       History of Present Illness      Sol Mccord is a 67 y o  male who presents in follow up of symptoms related to pancreatic cancer with liver mets  Pertinent issues include: symptom management, pain, neoplasm related, nausea, fatigue, assessment of goals of care, disease process education and discussion of prognosis, advance care planning  Mari Smiley was recently diagnosed with pancreatic cancer when his gastroenterologist ordered CT scan revealed pancreatic mass with liver and retroperitoneal mets  At that time, Mari Smiley was referred to the hospital for intervention related to obstructive jaundice  He was [facility] from 01/19/2022 - 01/22/2022  [facility] consult met with Joe during that hospitalization to assist with pain management and to introduce goals of care  At that time, Mari Smiley was started on Oxy IR 2 5 - 5 mg Q 4 H PRN moderate to severe pain  For insomnia, he was started on temazepam 7 5 mg q h s  p r n     For constipation, he received docusate 100 mg p o  b i d  p r n , lubiprostone 24 mcg b i d  with meals, and senna 1 tablet p o  q h s  p r n     For nausea, he received p o  Zofran  In hospital, goals of care were introduced  At that time, Mari Smiley confirmed he would like to be full code level 1  He was provided with 5 Wishes document to review  After discharge, he followed with Dr Majorie Goltz of oncology and was scheduled for 2nd opinion with [facility] recommended treatment with gemcitabine and oxaliplatin and Dr Majorie Goltz is in agreement  Most recent imaging shows progression of disease after three cycles of treatment  Dr Majorie Goltz has switched him to 5fluorouracil and oxaliplatin plus veliparib  Mari Smiley underwent EGD on 4/5/2022 due to swallowing concerns; it showed normal esophagus, distended stomach with food residue, and malignant-appearing stricture of duodenum  A biliary stent was placed       Today, Mari Smiley presents for follow-up with his daughter Rocío Renee  He has multiple concerns today, including weakness, increased leg edema, and inability to tolerate p o  intake  Toyin Mcnair reports having approximately 1 boost supplementation per day which is all he can tolerate  He states he feels no different after the stent placement than he did before  He endorses nausea and also constipation  He has not been taking senna as he has difficulty tolerating it  He received a superficial skin tear due to the IV in the hospital and has concerns about wound care  He states the mirtazapine has helped with sleep  We reviewed Wally's medications and made adjustments as above  We reviewed possible contingency plans if above adjustments do not work  I encouraged Toyinalee Mcnair to call the office if he continues with refractory nausea or constipation  We reviewed Wally's goals of care extensively  He endorses poor quality of life and he has concerns about tolerating chemotherapy which he has scheduled for tomorrow  We reviewed alternative nutrition such as PEG tube for which she is not an ideal candidate  Toyin Mcnair is not interested in PEG tube or artificial nutrition  We reviewed continued symptom management with palliative care versus transitioning to hospice services  Hospice care was discussed extensively  Due to his diminished oral intake, I recommended a hospice referral for further discussion  Toyin Mcnair and Rocío Renee are agreeable  In the meantime, they would like to continue with chemotherapy treatment  We will schedule routine follow-up for Toyin Mcnair in approximately 2-3 weeks; if he elects to proceed with hospice cares, this appointment can be canceled  Office staff confirmed wound care appointment for Toyin Mcnair  From prior: In the office today, Toyin Mcnair presents with his daughter Rocío Renee  He has multiple complaints  His main complaint is refractory constipation  Per Toyin Mcnair, he has not had a bowel movement in approximately 2 weeks    He denies vomiting or inability to tolerate p o  intake  He endorses passing gas  He states he is taking multiple senna tablets throughout the day combined with lubiprostone BID  Tea Spears states he is not interested in [employer]  Tea Spears expresses interest in taking Dulcolax  We discussed adding Dulcolax and diminishing senna  We discussed taking a suppository  Will add Colace, which he has not been taking  We also discussed adding lactulose solution if he continues to have refractory constipation  Scripts adjusted accordingly  Tea Spears also endorses fatigue and increased shortness of breath  He denies fevers or chills  He denies leg edema  He denies cough or sputum production  I suspect severe constipation with pancreatic mass may be contributing to his shortness of breath  Will prioritize having a bowel movement  Tea Spears states his appetite is limited  He has been taking boost nutritional supplements  He states his gastroenterologist has placed him on a fiber restricted soft diet, and he is not comfortable taking more than a liquid diet at the time due to severe constipation  He does have mild nausea which is relieved with Zofran  Refill sent at this time  Regarding Wally's pain, he states that the oxycodone 5mg only minimally alleviates his pain  He states he takes it 3 times daily  We discussed increasing dosing to 7 5mg Q 4-6H PRN and Tea Spears is agreeable  Discussed his concerns regarding tolerance and explained possibility for future opioid rotation should this develop  Tea Spears was noted to have low blood pressure during the intake  I rechecked his blood pressure at the end of the visit to confirm  Tea Spears states he has a BP cuff at home  I encouraged him to take his blood pressure daily  We discussed values of concern and when to hold his lisinopril and call his cardiologist     At this time, Wally's goal is to initiate cancer treatments    Further advance care planning not addressed at this visit  Recommend close follow-up to monitor Wally's symptoms, approximately 3 weeks  Encouraged however to call with any questions or concerns  Social:   Tanya Hatfield,  46 years] [phone]   - two adult children              - Carlos Edwards [phone]              - Haim Green, resides in [facility] [address]   - four grandchildren [aged 10 + 6 (twins], 9, and 8]   - resides with spouse, currently with 24/7 paid care giver support   - PhD in organic chemistry, retired, previous work with [employer] x 22 years        Past medical, surgical, social, and family histories are reviewed and pertinent updates are made  Review of Systems   Constitutional: Positive for decreased appetite and malaise/fatigue  Negative for fever  HENT: Negative for congestion and hearing loss  Respiratory: Negative for cough and sleep disturbances due to breathing  Endocrine: Negative for cold intolerance  Gastrointestinal: Positive for bloating, anorexia and nausea  Negative for diarrhea and vomiting  Genitourinary: Negative for bladder incontinence  Neurological: Positive for difficulty with concentration, excessive daytime sleepiness, headaches and light-headedness  Psychiatric/Behavioral: Negative for altered mental status and suicidal ideas  The patient has insomnia  Vital Signs    /90 (BP Location: Left arm, Patient Position: Sitting, Cuff Size: Standard)   Pulse (!) 111   Temp (!) 95 9 °F (35 5 °C) (Temporal)   Resp 14   Wt 80 7 kg (178 lb)   SpO2 93%   BMI 27 06 kg/m²     Physical Exam and Objective Data  Physical Exam  Vitals and nursing note reviewed  Constitutional:       Appearance: He is well-developed  He is obese  He is ill-appearing ( chronically)  HENT:      Head: Normocephalic and atraumatic  Eyes:      Conjunctiva/sclera: Conjunctivae normal    Cardiovascular:      Rate and Rhythm: Normal rate     Pulmonary:      Effort: Pulmonary effort is normal  No respiratory distress  Abdominal:      Palpations: Abdomen is soft  Musculoskeletal:      Cervical back: Neck supple  Right lower leg: Edema (3+) present  Left lower leg: Edema (3+) present  Skin:     General: Skin is warm and dry  Coloration: Skin is pale  Neurological:      Mental Status: He is alert and oriented to person, place, and time  He is confused  Radiology and Laboratory:  I personally reviewed and interpreted the following results: CT imaging, recent specialty notes    45 minutes was spent face to face with Carolann Argueta and his daughter Cary Salgado with greater than 50% of the time spent in counseling or coordination of care including discussions of risks and benefits of treatment, treatment instructions, follow up requirements, compliance with treatment regimen and symptom management, signs of concern for possible developing bowel obstruction, time spent answering questions extensively  Additional time was also spent in discussing coronavirus vaccine indications, availability, and logistical challenges  All of the patient's or agent's questions were answered during this discussion

## 2022-04-19 NOTE — TELEPHONE ENCOUNTER
Called Wally to touch base and see if he would like to schedule an appt with RD  Spoke with Joe Green, introduced self and explained reason for call  Pt reported feeling well  Joe Green was appreciative of the call, but he has overwhelming amount of appointments at this time, and does not feel like scheduling a nutritional consult  He has RD contact information and was encouraged to call if any questions/concerns arise

## 2022-04-19 NOTE — PROGRESS NOTES
Ok to treat patient with HgB 8 0, as per Dr Jane Smith; however, please evaluate and contact office for possible blood transfusion later in the week

## 2022-04-19 NOTE — PROGRESS NOTES
MSW met with the pt and his daughter when the pt was in the infusion center on 4/18  Infusion RN, Aleja Small asked MSW to meet with pt and his daughter as pt expressed an interest in hospice  The pt explained that he was not feeling well, was not able to eat and his cancer was progressing despite receiving chemo  He states that he felt it was time for hospice  The pt's daughter was present and she immediately became tearful  Daughter states that "Dr Rober Clemons told us that he would be the one to tell us when to stop treatment "  MSW listened to both the pt and his daughter  MSW encouraged the pt to call the palliative office as it sounds as though he is very uncomfortable and their goal is to maintain comfort  The pt was insistent he not have any surgery and he states that "is the only way I will be able to eat and feel better "  MSW asked the pt if he expressed his interest in hospice to Dr Rober Clemons and he states he has not  Pt has a virtual appointment with his [facility] and MSW encouraged him to have this discussion with this Doctor and then to call Dr Rober Clemons to discuss  MSW spent a great deal of time discussing hospice care and answering questions  Pt states he does want to "try" his next new chemo  Infusion RN explained to pt he will not feel immediate results and pt verbalized understanding  Significant support offered and MSW will follow up with pt as he struggles with his decision

## 2022-04-20 NOTE — PATIENT INSTRUCTIONS
Orders Placed This Encounter   Procedures    Wound cleansing and dressings     Wash your hands with soap and water  Remove old dressing, discard into plastic bag and place in trash  Cleanse the wound with soap and water prior to applying a clean dressing  Do not use tissue or cotton balls  Do not scrub the wound  Pat dry using gauze  Shower YES ON DRESSING CHANGE DAYS  Apply DERMAGRAM  to the RIGHT ARM wound  Cover with ALLEYVN    Change dressing 3XWEEK  DONE TODAY    RETURN IN 1 WEEK       Standing Status:   Future     Standing Expiration Date:   4/20/2023

## 2022-04-20 NOTE — PROGRESS NOTES
Pt tolerated treatment with no adv reactions; CADD pump connected with all clamps and connections taped; pump running with green light flashing; AVS given; s/s to report to physician reviewed with pt and daughter who verb understanding; pt left unit in [place] with daughter

## 2022-04-20 NOTE — PROGRESS NOTES
Received communication from the [facility] that they would rather the patient be started on FOLFIRI with Onivyde  There is potential for the patient not to tolerate this and my clinical opinion based on his age and performance status but based on the recommendations I discussed this with the patient  He will come in and sign a consent form and then we will change treatment  If he does not do well hospice is a reasonable option  They also thought starting a PARP inhibitor upfront may not be indicated as despite having a BRCA1 mutation they were concerned the tumor may not be BRCA associated  The PARP inhibitor was not approved by insurance regardless so we will stop pursuing this at this point and start FOLFIRI on the 4th of May

## 2022-04-20 NOTE — PROGRESS NOTES
Pt here for chemo; pt and daughter stated Dr Rehana Barber from [facility] made chemo recommendations for pt yesterday at [facility]; Dr Rehana Barber was to speak with Dr Vinh Martinez to discuss; notified Ashok Henderson RN/ Dr Vinh Martinez who said to proceed with ordered treatment today and Dr Vinh Martinez will speak with Dr Rehana Barber and make the necessary changes at that time; explained to pt and daughter who agreed; T&C also ordered and obtained due to pt's Hgb of 8 0 and fatigue and SOB

## 2022-04-20 NOTE — PROGRESS NOTES
Patient ID: Anuradha Piña is a 67 y o  male Date of Birth 1945     Chief Complaint  Chief Complaint   Patient presents with    New Patient Visit     wound care       Allergies  Patient has no known allergies  Assessment:      Diagnoses and all orders for this visit:    Open wound of right forearm, initial encounter  -     Wound cleansing and dressings; Future          Plan:  1  Initial visit  Wound cleansed normal saline and gauze  Wound not showing any signs symptoms of infection  Will have Dermagran gauze applied to wound covered with a silicone bordered foam changed 3 times a week  Patient may shower with wound uncovered on days dressing is due to be changed  Patient will followup in 1 week  Wound 04/20/22 Skin tear Arm Anterior;Right (Active)   Wound Image Images linked 04/20/22 0848   Wound Description Pink 04/20/22 0848   Nayely-wound Assessment Fragile 04/20/22 0848   Wound Length (cm) 3 cm 04/20/22 0848   Wound Width (cm) 3 cm 04/20/22 0848   Wound Depth (cm) 0 1 cm 04/20/22 0848   Wound Surface Area (cm^2) 9 cm^2 04/20/22 0848   Wound Volume (cm^3) 0 9 cm^3 04/20/22 0848   Calculated Wound Volume (cm^3) 0 9 cm^3 04/20/22 0848   Drainage Amount Moderate 04/20/22 0848   Drainage Description Serous 04/20/22 0848   Non-staged Wound Description Full thickness 04/20/22 0848   Treatments Cleansed 04/20/22 0848   Wound packed? No 04/20/22 0848   Dressing Changed Changed 04/20/22 0848   Patient Tolerance Tolerated well 04/20/22 0848   Dressing Status Intact 04/20/22 0848       Wound 02/10/22 Incision Chest Right (Active)   Date First Assessed/Time First Assessed: 02/10/22 0933   Primary Wound Type: Incision  Location: Chest  Wound Location Orientation: Right  Wound Description (Comments):  For Port Placement       Wound 04/20/22 Skin tear Arm Anterior;Right (Active)   Date First Assessed/Time First Assessed: 04/20/22 0854   Pre-Existing Wound: Yes  Traumatic Wound Type: Skin tear  Location: Arm  Wound Location Orientation: Anterior;Right       Subjective:     Patient is a 58-year-old male who presents to the [address] [place] [address] as a new patient for traumatic wound of his right forearm  Patient reports his wound developed a week ago when he developed to skin tear while in IV was being removed from his arm  Patient has a history of pain created cancer in is currently receiving chemo infusions  He reports his wound drains a small to moderate amount of drainage  He has been keeping his wound covered with dry gauze and Coban changing his dressing daily  He denies any pain, fevers, or chills  The following portions of the patient's history were reviewed and updated as appropriate:   He  has a past medical history of CAD (coronary artery disease), GERD (gastroesophageal reflux disease), Hyperlipidemia, Hypertension, Pancreatic cancer ([facility] [address] ), and Prostate cancer ([facility] [address] )    He   Patient Active Problem List    Diagnosis Date Noted    Severe protein-calorie malnutrition ([facility] [address] ) 04/05/2022    Anemia 04/04/2022    Duodenal stricture 04/04/2022    Leukocytosis 04/04/2022    Dehydration 03/18/2022    Malignant neoplasm of head of pancreas ([facility] [address] ) 02/01/2022    Chemotherapy induced neutropenia ([facility] [address] ) 02/01/2022    Palliative care patient 01/27/2022    Blood loss anemia 01/20/2022    Stage 3 chronic kidney disease ([facility] [address] ) 01/20/2022    Ischemic cardiomyopathy 01/20/2022    History of PTCA 01/20/2022    GERD (gastroesophageal reflux disease)     Pancreatic mass 01/19/2022    Pure hypercholesterolemia     Primary hypertension     Pain of upper abdomen 12/21/2021    Change in bowel habit 12/21/2021    Prostate cancer ([facility] [address] ) 12/21/2021    BRCA gene mutation positive in male 12/21/2021    Coronary artery disease involving native coronary artery of native heart without angina pectoris 12/21/2021     He  has a past surgical history that includes Cholecystectomy; Coronary angioplasty with stent; and IR port placement (2/10/2022)  His family history includes Dementia in his father; Esophageal cancer in his sister; Pancreatic cancer in his mother  He  reports that he has never smoked  He has never used smokeless tobacco  He reports previous alcohol use  He reports that he does not use drugs  Current Outpatient Medications   Medication Sig Dispense Refill    aspirin (ECOTRIN LOW STRENGTH) 81 mg EC tablet Take 81 mg by mouth daily      bisacodyl (DULCOLAX) 10 mg suppository Insert 1 suppository (10 mg total) into the rectum daily as needed for constipation 12 suppository 0    bisacodyl (FLEET) 10 MG/30ML ENEM Insert 30 mL (10 mg total) into the rectum once for 1 dose If suppository not helpful 37 mL 0    cetirizine (ZyrTEC) 10 mg tablet Take 10 mg by mouth      Elastic Bandages & Supports (Medical Compression Socks) MISC Use in the morning 20 - 30mmHG  If possible, please use compression socks with high pressure near foot/ankle (gradated)  2 each 0    fluorouracil 4,680 mg in CADD infusion pump Infuse 4,680 mg (1,200 mg/m2/day x 1 95 m2) into a venous catheter over 46 hours for 2 days  Do not start before April 20, 2022  1 each 0    mirtazapine (REMERON) 7 5 MG tablet Take 1 tablet (7 5 mg total) by mouth daily at bedtime 30 tablet 0    ondansetron (ZOFRAN) 4 mg tablet Take 1 tablet (4 mg total) by mouth every 8 (eight) hours as needed for nausea or vomiting 90 tablet 0    oxyCODONE (ROXICODONE) 5 immediate release tablet Take 1 - 2 tablets PO Q6H PRN pain   120 tablet 0    pantoprazole (PROTONIX) 20 mg tablet       polyethylene glycol (MIRALAX) 17 g packet Take 17 g by mouth daily (Patient not taking: Reported on 4/19/2022 ) 10 each 0    prochlorperazine (COMPAZINE) 10 mg tablet Take 1 tablet (10 mg total) by mouth every 6 (six) hours as needed for nausea or vomiting 30 tablet 0    senna (SENOKOT) 8 6 mg Take 1 tablet (8 6 mg total) by mouth daily at bedtime as needed for constipation (Patient not taking: Reported on 4/19/2022 ) 30 tablet 0     No current facility-administered medications for this visit  Facility-Administered Medications Ordered in Other Visits   Medication Dose Route Frequency Provider Last Rate Last Admin    sodium chloride 0 9 % infusion  20 mL/hr Intravenous Once PRN Donya Valles MD   Stopped at 04/20/22 1100     He has No Known Allergies       Review of Systems   Constitutional: Negative  HENT: Negative for ear pain and hearing loss  Eyes: Negative for pain  Respiratory: Negative for chest tightness and shortness of breath  Cardiovascular: Negative for chest pain, palpitations and leg swelling  Gastrointestinal: Negative for diarrhea, nausea and vomiting  Genitourinary: Negative for dysuria  Musculoskeletal: Negative for gait problem  Skin: Positive for wound  Neurological: Negative for tremors and weakness  Psychiatric/Behavioral: Negative for behavioral problems, confusion and suicidal ideas  Objective:       Wound 04/20/22 Skin tear Arm Anterior;Right (Active)   Wound Image Images linked 04/20/22 0848   Wound Description Pink 04/20/22 0848   Nayely-wound Assessment Fragile 04/20/22 0848   Wound Length (cm) 3 cm 04/20/22 0848   Wound Width (cm) 3 cm 04/20/22 0848   Wound Depth (cm) 0 1 cm 04/20/22 0848   Wound Surface Area (cm^2) 9 cm^2 04/20/22 0848   Wound Volume (cm^3) 0 9 cm^3 04/20/22 0848   Calculated Wound Volume (cm^3) 0 9 cm^3 04/20/22 0848   Drainage Amount Moderate 04/20/22 0848   Drainage Description Serous 04/20/22 0848   Non-staged Wound Description Full thickness 04/20/22 0848   Treatments Cleansed 04/20/22 0848   Wound packed? No 04/20/22 0848   Dressing Changed Changed 04/20/22 0848   Patient Tolerance Tolerated well 04/20/22 0848   Dressing Status Intact 04/20/22 0848       /76   Pulse 68   Temp (!) 96 3 °F (35 7 °C)   Resp 16   Ht 5' 5" (1 651 m)   Wt 76 2 kg (168 lb)   BMI 27 96 kg/m²     Physical Exam  Vitals and nursing note reviewed  Constitutional:       General: He is not in acute distress  Appearance: Normal appearance  He is normal weight  HENT:      Head: Normocephalic and atraumatic  Eyes:      General:         Right eye: No discharge  Left eye: No discharge  Pulmonary:      Effort: Pulmonary effort is normal  No respiratory distress  Musculoskeletal:         General: Normal range of motion  Cervical back: Normal range of motion and neck supple  No rigidity  Right lower leg: No edema  Left lower leg: No edema  Skin:     General: Skin is warm and dry  Findings: Wound present  No erythema  Neurological:      General: No focal deficit present  Mental Status: He is alert and oriented to person, place, and time  Mental status is at baseline  Psychiatric:         Mood and Affect: Mood normal          Behavior: Behavior normal          Thought Content: Thought content normal          Judgment: Judgment normal                     Wound Instructions:  Orders Placed This Encounter   Procedures    Wound cleansing and dressings     Wash your hands with soap and water  Remove old dressing, discard into plastic bag and place in trash  Cleanse the wound with soap and water prior to applying a clean dressing  Do not use tissue or cotton balls  Do not scrub the wound  Pat dry using gauze  Shower YES ON DRESSING CHANGE DAYS  Apply DERMAGRAM  to the RIGHT ARM wound  Cover with ALLEYVN    Change dressing 3XWEEK  DONE TODAY    RETURN IN 1 WEEK  Standing Status:   Future     Standing Expiration Date:   4/20/2023        Diagnosis ICD-10-CM Associated Orders   1   Open wound of right forearm, initial encounter  S51 801A Wound cleansing and dressings

## 2022-04-20 NOTE — TELEPHONE ENCOUNTER
Message from Albaro 40 in infusion in regards to setting pt up for a blood transfusion this week  There will be time on Friday for pt to receive 1 Unit PRBC after his CADD disconnectt  Pt current hemoglobin at 8 and requires blood based on this parameter  Pt will be scheduled for another unit on Monday as well

## 2022-04-22 NOTE — PROGRESS NOTES
Rec'd pt in [facility], CADD pump infusion completed and disconnected per protocol  Labs drawn and sent  Premeds tolerated well  I unit PRBCs now infusing  Neulasta OnPro Kit applied ro Left upper arm

## 2022-04-22 NOTE — TELEPHONE ENCOUNTER
Primary palliative medicine provider:   Dr Alfonso Barahona    Medication requested: Mirtazapine (Remeron) 7 5 mg tablet    If for pain, how has the patient been taking their pain medicine?      Last appointment: 04/21/2022    Next scheduled appointment: 05/10/2022    PDMP review:

## 2022-04-25 NOTE — PROGRESS NOTES
Patient received 1u PRBCs followed by 20 mg IV lasix, no adverse reactions  Dressing CD&I, AVS provided  Left unit via WC accompanied by son-in-law

## 2022-04-25 NOTE — PROGRESS NOTES
MSW met with the pt and his son in law in the infusion center this day to follow up on the hospice conversation which the pt initiated last week  The pt states that he met with the [facility] Doctor and he will continue with this chemo for now  Pt states that his pain is not as bad as it was last week and that he called Palliative Care and some changes were made to his medications  The pt's son-in-law commented that the pt plans on continuing with chemo until "Dr Vera Parents tells us that it is no longer working " It appeared unclear, but the pt's reaction, if he was in agreement with this decision  MSW reminded the pt and son-in-law that he could make that decision as well and discuss with Dr Vera Parents  Support offered and MSW will continue to follow

## 2022-04-27 NOTE — PROGRESS NOTES
Patient ID: Olivia Lopes is a 67 y o  male Date of Birth 1945     Chief Complaint  Chief Complaint   Patient presents with    Follow Up Wound Care Visit     right forearm wound       Allergies  Patient has no known allergies  Assessment:     Diagnoses and all orders for this visit:    Open wound of right forearm, initial encounter  -     Wound cleansing and dressings; Future          Plan:  1  F/u visit  Wound epithelialized  Counseled patient to moisturize skin daily with lotion  Patient may keep area MANAS  Patient is discharged from the wound center today  He may follow up PRN  Wound 04/20/22 Skin tear Arm Anterior;Right (Active)   Wound Image Images linked 04/27/22 1122   Wound Description Epithelialization 04/27/22 1121   Wound Length (cm) 0 cm 04/27/22 1121   Wound Width (cm) 0 cm 04/27/22 1121   Wound Depth (cm) 0 cm 04/27/22 1121   Wound Surface Area (cm^2) 0 cm^2 04/27/22 1121   Wound Volume (cm^3) 0 cm^3 04/27/22 1121   Calculated Wound Volume (cm^3) 0 cm^3 04/27/22 1121   Change in Wound Size % 100 04/27/22 1121   Drainage Amount None 04/27/22 1121   Non-staged Wound Description Not applicable 58/19/41 2980   Dressing Status Intact 04/27/22 1121       Wound 02/10/22 Incision Chest Right (Active)   Date First Assessed/Time First Assessed: 02/10/22 0933   Primary Wound Type: Incision  Location: Chest  Wound Location Orientation: Right  Wound Description (Comments): For Port Placement       Wound 04/20/22 Skin tear Arm Anterior;Right (Active)   Date First Assessed/Time First Assessed: 04/20/22 0854   Pre-Existing Wound: Yes  Traumatic Wound Type: Skin tear  Location: Arm  Wound Location Orientation: Anterior;Right       Subjective:     F/u visit traumatic wound of right forearm  No new complaints  He denies any pain, fevers, or chills         The following portions of the patient's history were reviewed and updated as appropriate:   He  has a past medical history of CAD (coronary artery disease), GERD (gastroesophageal reflux disease), Hyperlipidemia, Hypertension, Pancreatic cancer ([place] [address] ), and Prostate cancer ([place] [address] )  He   Patient Active Problem List    Diagnosis Date Noted    Severe protein-calorie malnutrition ([place] [address] ) 04/05/2022    Anemia 04/04/2022    Duodenal stricture 04/04/2022    Leukocytosis 04/04/2022    Dehydration 03/18/2022    Malignant neoplasm of head of pancreas ([place] [address] ) 02/01/2022    Chemotherapy induced neutropenia ([place] [address] ) 02/01/2022    Palliative care patient 01/27/2022    Blood loss anemia 01/20/2022    Stage 3 chronic kidney disease ([place] [address] ) 01/20/2022    Ischemic cardiomyopathy 01/20/2022    History of PTCA 01/20/2022    GERD (gastroesophageal reflux disease)     Pancreatic mass 01/19/2022    Pure hypercholesterolemia     Primary hypertension     Pain of upper abdomen 12/21/2021    Change in bowel habit 12/21/2021    Prostate cancer ([place] [address] ) 12/21/2021    BRCA gene mutation positive in male 12/21/2021    Coronary artery disease involving native coronary artery of native heart without angina pectoris 12/21/2021     He  has a past surgical history that includes Cholecystectomy; Coronary angioplasty with stent; and IR port placement (2/10/2022)  His family history includes Dementia in his father; Esophageal cancer in his sister; Pancreatic cancer in his mother  He  reports that he has never smoked  He has never used smokeless tobacco  He reports previous alcohol use  He reports that he does not use drugs    Current Outpatient Medications   Medication Sig Dispense Refill    aspirin (ECOTRIN LOW STRENGTH) 81 mg EC tablet Take 81 mg by mouth daily      bisacodyl (DULCOLAX) 10 mg suppository Insert 1 suppository (10 mg total) into the rectum daily as needed for constipation 12 suppository 0    bisacodyl (FLEET) 10 MG/30ML ENEM Insert 30 mL (10 mg total) into the rectum once for 1 dose If suppository not helpful 37 mL 0    cetirizine (ZyrTEC) 10 mg tablet Take 10 mg by mouth      Elastic Bandages & Supports (Medical Compression Socks) MISC Use in the morning 20 - 30mmHG  If possible, please use compression socks with high pressure near foot/ankle (gradated)  2 each 0    [START ON 5/4/2022] fluorouracil 4,585 mg in CADD infusion pump Infuse 4,585 mg (1,200 mg/m2/day x 1 91 m2) into a venous catheter over 46 hours for 2 days  Do not start before May 4, 2022  1 each 0    mirtazapine (REMERON) 7 5 MG tablet Take 1 tablet (7 5 mg total) by mouth daily at bedtime 30 tablet 0    ondansetron (ZOFRAN) 4 mg tablet Take 1 tablet (4 mg total) by mouth every 8 (eight) hours as needed for nausea or vomiting 90 tablet 0    oxyCODONE (ROXICODONE) 5 immediate release tablet Take 1 - 2 tablets PO Q6H PRN pain  120 tablet 0    pantoprazole (PROTONIX) 20 mg tablet       polyethylene glycol (MIRALAX) 17 g packet Take 17 g by mouth daily (Patient not taking: Reported on 4/19/2022 ) 10 each 0    prochlorperazine (COMPAZINE) 10 mg tablet Take 1 tablet (10 mg total) by mouth every 6 (six) hours as needed for nausea or vomiting 30 tablet 0    senna (SENOKOT) 8 6 mg Take 1 tablet (8 6 mg total) by mouth daily at bedtime as needed for constipation (Patient not taking: Reported on 4/19/2022 ) 30 tablet 0     No current facility-administered medications for this visit  He has No Known Allergies       Review of Systems   Constitutional: Negative  HENT: Negative for ear pain and hearing loss  Eyes: Negative for pain  Respiratory: Negative for chest tightness and shortness of breath  Cardiovascular: Negative for chest pain, palpitations and leg swelling  Gastrointestinal: Negative for diarrhea, nausea and vomiting  Genitourinary: Negative for dysuria  Musculoskeletal: Negative for gait problem  Skin: Positive for wound  Neurological: Negative for tremors and weakness  Psychiatric/Behavioral: Negative for behavioral problems, confusion and suicidal ideas           Objective:       Wound 04/20/22 Skin tear Arm Anterior;Right (Active)   Wound Image Images linked 04/27/22 1122   Wound Description Epithelialization 04/27/22 1121   Wound Length (cm) 0 cm 04/27/22 1121   Wound Width (cm) 0 cm 04/27/22 1121   Wound Depth (cm) 0 cm 04/27/22 1121   Wound Surface Area (cm^2) 0 cm^2 04/27/22 1121   Wound Volume (cm^3) 0 cm^3 04/27/22 1121   Calculated Wound Volume (cm^3) 0 cm^3 04/27/22 1121   Change in Wound Size % 100 04/27/22 1121   Drainage Amount None 04/27/22 1121   Non-staged Wound Description Not applicable 07/29/53 8592   Dressing Status Intact 04/27/22 1121       /90   Pulse 100   Temp (!) 96 °F (35 6 °C)   Resp 18     Physical Exam  Vitals and nursing note reviewed  Constitutional:       General: He is not in acute distress  Appearance: Normal appearance  He is normal weight  HENT:      Head: Normocephalic and atraumatic  Eyes:      General:         Right eye: No discharge  Left eye: No discharge  Pulmonary:      Effort: Pulmonary effort is normal  No respiratory distress  Musculoskeletal:         General: Normal range of motion  Cervical back: Normal range of motion and neck supple  No rigidity  Right lower leg: No edema  Left lower leg: No edema  Skin:     General: Skin is warm and dry  Findings: No erythema or wound  Neurological:      General: No focal deficit present  Mental Status: He is alert and oriented to person, place, and time  Mental status is at baseline  Psychiatric:         Mood and Affect: Mood normal          Behavior: Behavior normal          Thought Content: Thought content normal          Judgment: Judgment normal                     Wound Instructions:  Orders Placed This Encounter   Procedures    Wound cleansing and dressings     Wound to right forearm is healed and discharged from the [facility] today  Apply unscented lotion daily to area  May want to purchase sindhu-sleeves to protect the area       Standing Status:   Future     Standing Expiration Date:   4/27/2023        Diagnosis ICD-10-CM Associated Orders   1   Open wound of right forearm, initial encounter  S51 801A Wound cleansing and dressings

## 2022-04-27 NOTE — PATIENT INSTRUCTIONS
Orders Placed This Encounter   Procedures    Wound cleansing and dressings     Wound to right forearm is healed and discharged from the [facility] today  Apply unscented lotion daily to area  May want to purchase sindhu-sleeves to protect the area       Standing Status:   Future     Standing Expiration Date:   4/27/2023

## 2022-04-28 NOTE — TELEPHONE ENCOUNTER
Patient was last seen 4/19/22 by our PA Nicolette Santacruz and daughters calling stating he has been declining since his last apt  Patient has gotten a lot weaker ,has no energy and is having difficulty eating  She states he has such discomfort getting the food down that he doesn't want to bother  She was asking for some advice/call from our PA

## 2022-04-28 NOTE — TELEPHONE ENCOUNTER
I returned call to Karen Renteria daughter  We reviewed Wally's symptoms  She notes that he demonstrates increased fatigue and difficulty tolerating PO intake  She estimates that he is receiving approximately 300 calories per day  She notes he has been mentioning hospice cares however he is agreeable to trying other available medication therapy in order to make it through until his next infusion  In our recent visit, we reviewed Wally's goals and said he would not be interested in artificial nutrition or PEG tube  Hospice was recommended at that time  We reviewed Wally's medications  Will add Decadron 2 mg with breakfast and lunch  I reiterated the importance of honoring Wally's wishes, that he will be supported if he ultimately decides to transition to comfort cares  Evens Varela expresses understanding  She hopes the new chemo regimen prescribed by Dr Alban Fagan will be effective

## 2022-04-29 NOTE — TELEPHONE ENCOUNTER
Called and spoke to pt Daughter in regards to pt symptoms related to treatment  Not tolerating well, with increased weakness, abdominal pain  Pt only consuming 300 calories a day  Hospice was recommend and pt agreeable per daughter  Daughter wondering if he should still get treatment for folfori next week but pt is leaning towards not going  Message sent to Dr Vera Parents for reccommended intervention

## 2022-04-29 NOTE — TELEPHONE ENCOUNTER
I called to speak with Brianne Ovid  She expresses that her father is interested in discussing hospice  She has noticed increased fatigue and is agreeable to discussing hospice as well  They are waiting to hear back from Dr Shiloh Carlos but would like to meet with hospice for information  We discussed Wally's condition  I explained my belief that Mervat Cervantes would benefit from hospice cares now or in the near future and I will place hospice referral at this time  I will refill Compazine  I encouraged Brianne Jose to call my office with any further questions or concerns  She asked if it was time to have family come visit Mervat Cervantes and I encouraged this

## 2022-04-29 NOTE — TELEPHONE ENCOUNTER
Primary palliative medicine provider:   Wilmer Sanchez    Medication requested:   Prochlorperazine (Compazine     0 10 Mg tablet    If for pain, how has the patient been taking their pain medicine? Last appointment:  04/21/2022    Next scheduled appointment: 05/10/2022    PDMP review:      Dominik Verma called asking to speak to you about Hospice  Mari Smiley is very weak and his stomach is bloated and unable to move due to both

## 2022-04-29 NOTE — TELEPHONE ENCOUNTER
Called and spoke to pt's daughter Joaquín Duque to discuss side effects related to treatment  Daughter states pt has been declining, has low energy, dizziness, bloated stomach, legs swollen and difficulty getting oob from chair to bed  palliative has prescribed a steroid to help  Pt consuming only 300 calories per day past 3 weeks since recent hospital d/c  Doesn't think he would be able to tolerate upcoming treatment  Thinking about hospice which was mentioned to him  I know Magaly Winslow testing is still pending at this time for about 1 more week before resulted  Would you like to see him for a f/u apt in office to discuss?

## 2022-04-29 NOTE — TELEPHONE ENCOUNTER
Spoke with Daughter, can we please schedule pt for in office f/u apt next week to discuss dose reduction or hospice most likely if pt continues to be agreeable   Will contact infusion and cancel next week treatment

## 2022-04-29 NOTE — TELEPHONE ENCOUNTER
CALL RETURN FORM   Reason for patient call? Patients daughter Holland Gerard, calling to speak with [facility] concerning patient  Patient's primary oncologist? Dr Deja Castillo   Name of person the patient was calling for? Ber[place]ane   Any additional information to add, if applicable? [phone]   Informed patient that the message will be forwarded to the team and someone will get back to them as soon as possible    Did you relay this information to the patient?  yes

## 2022-05-02 NOTE — TELEPHONE ENCOUNTER
CALL RETURN FORM   Reason for patient call? Patient's daughter called in to schedule an appointment with Dr Jackie Dickinson    Patient's daughter stating patient in not doing so well    Offered appointment on 5/4/22  in [place] but patient's daughter states patient does not handle car rides very well    Requesting if patient could be seen today in [address] [place] [address] if there is a cancellation or if patient can be seen Friday     Patient's daughter also requesting a call back to discuss patient   Patient's primary oncologist? Dr Jackie Dickinson   Name of person the patient was calling for? Dr Jackie Dickinson   Any additional information to add, if applicable? Informed patient that the message will be forwarded to the team and someone will get back to them as soon as possible    Did you relay this information to the patient?  Yes

## 2022-05-02 NOTE — PROGRESS NOTES
MSW received message from Dr Uzma Patel office that pt had a decline and family was requesting hospice  A STAT order was placed and office was reaching out to MSW to assure this referral was handled quickly  MSW made outreach to [facility]  They had received the referral and the liaison was in the process of contacting the family  MSW notified the oncology office    MSW will close out this case as the pt will now receive social work services through the hospice program

## 2022-05-02 NOTE — TELEPHONE ENCOUNTER
Referral to hospice care was placed by Dr Michael Hankins and he asked us to set patient up ASAP  I reached out to Florentino Burgos who will call hospice to help set the patient up

## 2022-05-04 PROBLEM — Z51.5 PALLIATIVE CARE PATIENT: Status: RESOLVED | Noted: 2022-01-01 | Resolved: 2022-01-01

## 2022-05-12 NOTE — HOSPICE
Prn visit made to patient to help transfer to new hospital bed  Pt was transferred into bed by the time SN arrived  SN helped family arrange room to accomodate hospital bed  Assessed patient  He is looking more comfortable at this time  Spoke with family all in agreement this was correct choice and he is better position to rest  Family was concerned about hiccups/ gagging  Spoke with Jh Pacheco and received orders to start Haldol 1mg q 6hrs as needed  Educated family how to administer medication  Family was able to teach back their understanding

## 2022-05-13 NOTE — HOSPICE
RN disposed of all controlled substances per protocol  RN disposed of all medications per protocol    Morphine 16 ml  lorazepam liquid 25 5 ml  oxycodone 5 mg 101 tabs  lorazepam tabs 27   haldol liquid 40 ml  tylenol supp 3  Dulcolax supp 1  Levsin 12  compazine 6    disposed in [facility] litter    Name of witness: reji Verma    Name and relationship of authorizer reji Verma

## 2022-05-13 NOTE — CASE COMMUNICATION
Ship to   Clinician    Branch   :[employer]  16fr 5cc cath [id number] 1     Syringe 10cc [id number] 2   10cc cath tray [id number]  Drain bag 2000cc [id number]  [place]  [id number]

## 2022-05-16 NOTE — HOSPICE
Prn visit made to assess patients comfort  Patient is struggling with breathing and appears uncomfortable  Spoke with Jh 42 and new orders to switch pain medication to morphine to help with shortness of breath  Prefilled syringes for family and educated on new medication

## 2022-05-16 NOTE — HOSPICE
Prn visit made to patient as family reported he appeared uncomfortable  Spoke with Jh Pacheco for new orders  Orders to increase morphine and ativan dosage  Prefilled syringes for family with new dose

## 2023-05-01 NOTE — PROGRESS NOTES
Unit of [facility] infused without problems, pt appeared comfortable while walking the unit accomapnied by nurse  No SOB noted  Lasix given as ordered  Port then Keshav and pt discharged to home via wc accompanied by daughter, AVS in hand   Pt is aware of next several appointments  Detail Level: Detailed

## 2023-10-31 LAB — SCAN RESULT: NORMAL
